# Patient Record
Sex: FEMALE | Race: WHITE | NOT HISPANIC OR LATINO | Employment: OTHER | URBAN - METROPOLITAN AREA
[De-identification: names, ages, dates, MRNs, and addresses within clinical notes are randomized per-mention and may not be internally consistent; named-entity substitution may affect disease eponyms.]

---

## 2017-08-19 ENCOUNTER — APPOINTMENT (EMERGENCY)
Dept: RADIOLOGY | Facility: HOSPITAL | Age: 82
End: 2017-08-19
Payer: MEDICARE

## 2017-08-19 ENCOUNTER — HOSPITAL ENCOUNTER (EMERGENCY)
Facility: HOSPITAL | Age: 82
Discharge: HOME | End: 2017-08-19
Attending: EMERGENCY MEDICINE | Admitting: EMERGENCY MEDICINE
Payer: MEDICARE

## 2017-08-19 VITALS
TEMPERATURE: 97.1 F | HEART RATE: 80 BPM | SYSTOLIC BLOOD PRESSURE: 124 MMHG | DIASTOLIC BLOOD PRESSURE: 58 MMHG | RESPIRATION RATE: 16 BRPM | HEIGHT: 59 IN | OXYGEN SATURATION: 94 %

## 2017-08-19 DIAGNOSIS — S63.502A LEFT WRIST SPRAIN: Primary | ICD-10-CM

## 2017-08-19 PROCEDURE — 99283 EMERGENCY DEPT VISIT LOW MDM: CPT

## 2017-08-19 PROCEDURE — 73110 X-RAY EXAM OF WRIST: CPT

## 2017-08-19 RX ORDER — ACETAMINOPHEN 325 MG/1
975 TABLET ORAL ONCE
Status: COMPLETED | OUTPATIENT
Start: 2017-08-19 | End: 2017-08-19

## 2017-08-19 RX ORDER — NYSTATIN 100000 U/G
OINTMENT TOPICAL
COMMUNITY

## 2017-08-19 RX ORDER — LISINOPRIL 20 MG/1
TABLET ORAL
COMMUNITY
Start: 2013-12-20 | End: 2021-02-26 | Stop reason: HOSPADM

## 2017-08-19 RX ORDER — CITALOPRAM 20 MG/1
TABLET ORAL
COMMUNITY
Start: 2013-10-10 | End: 2018-08-10

## 2017-08-19 RX ORDER — GABAPENTIN 100 MG/1
CAPSULE ORAL
COMMUNITY
Start: 2016-11-04 | End: 2018-08-10

## 2017-08-19 RX ORDER — PRAVASTATIN SODIUM 80 MG/1
TABLET ORAL
COMMUNITY
End: 2019-09-14

## 2017-08-19 RX ORDER — AMLODIPINE BESYLATE 5 MG/1
TABLET ORAL
COMMUNITY
Start: 2014-03-21

## 2017-08-19 RX ORDER — LEVOTHYROXINE SODIUM 0.1 MG/1
TABLET ORAL
COMMUNITY
Start: 2014-03-21

## 2017-08-19 RX ADMIN — ACETAMINOPHEN 975 MG: 325 TABLET ORAL at 11:06

## 2017-08-25 ENCOUNTER — ALLSCRIPTS OFFICE VISIT (OUTPATIENT)
Dept: OTHER | Facility: OTHER | Age: 82
End: 2017-08-25

## 2018-01-10 NOTE — PROCEDURES
Results/Data    Procedure: Electromyogram and Nerve Conduction Study  Indication: Bilateral Lower Extremities   Referred by Dr Berna Dalton  The procedure's were discussed with the patient  Written consent was obtained prior to the procedure and is detailed in the patient's record  Prior to the start of the procedure a time out was taken and the identity of the patient was confirmed via name and date of birth with the patient  The correct site and the procedure to be performed were confirmed  The correct side was confirmed if applicable  The positioning of the patient was verified  The availability of the correct equipment was verified  Procedure Start Time: 11:00    Technique: A sterile concentric needle electrode was used  The patient tolerated the procedure well  There were no complications  Results :Motor and sensory nerve conduction studies were performed on the bilateral peroneal, tibial and sural nerves  The right peroneal and the bilateral tibial compound motor action potentials were within normal limits  The left peroneal motor terminal latency was within normal limits with a low compound motor action potential amplitude and a normal conduction velocity distally and across the fibula head  The bilateral peroneal and tibial F wave latencies were within normal limits  The right sural sensory action potential was within normal limits  The left sural sensory peak latency was within normal limits with a low sensory action potential amplitude  The right and left H soleus responses were within normal limits  Concentric needle examination was performed on various proximal and distal muscles bilaterally including gluteus medius, vastus lateralis, tibialis anterior, medial gastrocnemius, EDB, L4-5 and L5-S1 paraspinal myotomes  There was no evidence of active denervation in any of the muscles tested   Mild decreased recruitment of giant motor units was noted in the left gluteus medius and EDB  The compound motor unit action potentials were of normal configuration with interference patterns being full or full for effort in the remaining muscles tested  Interpretation: There is electrophysiologic evidence of a:    1  Chronic left L5 radiculopathy as evidenced by the low peroneal motor amplitude and chronic denervation changes in the above-mentioned muscles  2  The low sural sensory amplitude on the left is of questionable significance  It maybe secondary to a technical error  Clinical and imaging correlation of the lumbosacral spine is suggested        Signatures   Electronically signed by : Judith Hadley MD; Aug 23 2016 11:38AM EST                       (Author)

## 2018-01-14 VITALS
HEART RATE: 64 BPM | WEIGHT: 204.38 LBS | SYSTOLIC BLOOD PRESSURE: 132 MMHG | DIASTOLIC BLOOD PRESSURE: 78 MMHG | HEIGHT: 60 IN | BODY MASS INDEX: 40.13 KG/M2

## 2018-08-02 ENCOUNTER — HOSPITAL ENCOUNTER (EMERGENCY)
Facility: HOSPITAL | Age: 83
Discharge: HOME | End: 2018-08-02
Attending: EMERGENCY MEDICINE | Admitting: EMERGENCY MEDICINE
Payer: MEDICARE

## 2018-08-02 VITALS
RESPIRATION RATE: 20 BRPM | HEART RATE: 82 BPM | OXYGEN SATURATION: 95 % | SYSTOLIC BLOOD PRESSURE: 126 MMHG | TEMPERATURE: 98.9 F | DIASTOLIC BLOOD PRESSURE: 61 MMHG | WEIGHT: 204.37 LBS | HEIGHT: 59 IN | BODY MASS INDEX: 41.2 KG/M2

## 2018-08-02 DIAGNOSIS — B02.9 HERPES ZOSTER WITHOUT COMPLICATION: Primary | ICD-10-CM

## 2018-08-02 PROCEDURE — 99282 EMERGENCY DEPT VISIT SF MDM: CPT

## 2018-08-02 RX ORDER — VALACYCLOVIR HYDROCHLORIDE 1 G/1
1000 TABLET, FILM COATED ORAL 3 TIMES DAILY
Qty: 21 TABLET | Refills: 0 | Status: SHIPPED | OUTPATIENT
Start: 2018-08-02 | End: 2018-08-10

## 2018-08-02 RX ORDER — OXYCODONE HYDROCHLORIDE AND ACETAMINOPHEN 5; 325 MG/1; MG/1
1 TABLET ORAL EVERY 4 HOURS PRN
Qty: 15 TABLET | Refills: 0 | Status: SHIPPED | OUTPATIENT
Start: 2018-08-02 | End: 2018-08-10

## 2018-08-02 NOTE — DISCHARGE INSTRUCTIONS
Shingles   AMBULATORY CARE:   Shingles  is a painful rash  Shingles is caused by the same virus that causes chickenpox (varicella-zoster virus)  After you get chickenpox, the virus stays in your body for several years without causing any symptoms  Shingles occurs when the virus becomes active again  Once active, the virus will travel along a nerve to your skin and cause a rash  Common signs and symptoms include the following:  Shingles often starts with pain in the back, chest, neck, or face  A rash then develops in the same area  The rash is usually found on only one side of the body  The rash may feel itchy or painful  It starts as red dots that become blisters filled with fluid  The blisters usually grow bigger, become filled with pus, and then crust over after a few days  You may also have any of the following:  · Fatigue and muscle weakness    · Pain when your skin is lightly touched    · Headache    · Fever    · Eye pain when exposed to light  Seek care immediately if:   · You have painful, red, warm skin around the blisters, or the blisters drain pus  · Your neck is stiff or you have trouble moving it  · You have trouble moving your arms, legs, or face  · You have a seizure  · You have weakness in an arm or leg  · You become confused, or have difficulty speaking  · You have dizziness, a severe headache, or hearing or vision loss  Contact your healthcare provider if:   · You feel weak or have a headache  · You have a cough, chills, or a fever  · You have abdominal pain or nausea, or you are vomiting  · Your rash becomes more itchy or painful  · Your rash spreads to other parts of your body  · Your pain worsens and does not go away even after you take medicine  · You have questions or concerns about your condition or care  Medicines:   · Antiviral medicine  helps decrease symptoms and healing time  They may also decrease your risk of developing nerve pain   You will need to start taking them within 3 days of the start of symptoms to prevent nerve pain  · Pain medicine  may be prescribed or suggested by your healthcare provider  You may need NSAIDs, acetaminophen, or opioid medicine depending on how much pain you are in  Do not wait until the pain is severe before you take more pain medicine  · Topical anesthetics  are used to numb the skin and decrease pain  They can be a cream, gel, spray, or patch  · Anticonvulsants  decrease nerve pain and may help you sleep at night  · Antidepressants  may be used to decrease nerve pain  Follow up with your healthcare provider as directed:  Write down your questions so you remember to ask them during your visits  Self-care:  Keep your rash clean and dry  Cover your rash with a bandage or clothing  Do not use bandages that stick to your skin  The sticky part may irritate your skin and make your rash last longer  Prevent the spread of shingles: The virus can be passed to a person who has never had chickenpox  This person may get chickenpox, but not shingles  You may pass the virus to others as long as you have a rash  The virus is spread by direct contact with the fluid from the blisters  Usually, you cannot spread the virus once the blisters dry up  Prevent shingles or another shingles outbreak:  A vaccine may be given to help prevent shingles  Ask for more information about this vaccine  © 2017 [employer] Information is for End User's use only and may not be sold, redistributed or otherwise used for commercial purposes  All illustrations and images included in CareNotes® are the copyrighted property of [employer]  or [employer]  The above information is an  only  It is not intended as medical advice for individual conditions or treatments  Talk to your doctor, nurse or pharmacist before following any medical regimen to see if it is safe and effective for you

## 2018-08-02 NOTE — ED PROVIDER NOTES
History  Chief Complaint   Patient presents with    Rash     Patient c/o rash from her mid sternum around to her right side  Patient states rash is very painful  Patient states history of shingles in the past       R flank pain x 3d, constant, pruritic, burning, nonradiating  No h/o similar sxs  No fever or chills  Currently on prednisone and levaquin per her pulmonologist for PNA  Currently symptomatic  No abd pain weakness or syncope  No CP aside from R flank pain  History provided by:  Relative and patient      Prior to Admission Medications   Prescriptions Last Dose Informant Patient Reported? Taking? amLODIPine (NORVASC) 5 mg tablet   Yes No   Sig: Take by mouth   aspirin 81 MG tablet   Yes No   Sig: Take by mouth   citalopram (CeleXA) 20 mg tablet   Yes No   Sig: Take by mouth   gabapentin (NEURONTIN) 100 mg capsule   Yes No   Sig: Take by mouth   levothyroxine 100 mcg tablet   Yes No   Sig: Take by mouth   lisinopril (ZESTRIL) 20 mg tablet   Yes No   Sig: Take by mouth   nystatin (MYCOSTATIN) ointment   Yes No   Sig: Nystatin 322480 UNIT/GM External Ointment  APPLY INCH  PRN   Refills: 0    Active   pravastatin (PRAVACHOL) 80 mg tablet   Yes No   Sig: Take by mouth      Facility-Administered Medications: None       Past Medical History:   Diagnosis Date    GERD (gastroesophageal reflux disease)     Hyperlipidemia     Hypertension        History reviewed  No pertinent surgical history  History reviewed  No pertinent family history  I have reviewed and agree with the history as documented  Social History   Substance Use Topics    Smoking status: Never Smoker    Smokeless tobacco: Never Used    Alcohol use No        Review of Systems   Constitutional: Negative for chills and fever  HENT: Negative for congestion, ear pain, sore throat and trouble swallowing  Eyes: Negative for discharge, itching and visual disturbance     Respiratory: Negative for apnea, choking and chest tightness  Cardiovascular: Negative for chest pain and leg swelling  Genitourinary: Negative  Musculoskeletal: Negative  Skin: Positive for rash  Negative for color change, pallor and wound  Neurological: Negative  Hematological: Negative  Physical Exam  Physical Exam   Constitutional: She is oriented to person, place, and time  She appears well-developed and well-nourished  HENT:   Head: Normocephalic and atraumatic  Eyes: EOM are normal  Pupils are equal, round, and reactive to light  Neck: Normal range of motion  Neck supple  Pulmonary/Chest: Effort normal    Abdominal: Soft  Musculoskeletal: Normal range of motion  She exhibits no edema or deformity  Neurological: She is alert and oriented to person, place, and time  Skin: Skin is warm and dry  Rash noted  No erythema  R dermatomally distributed erythematous blanching rash with centrally located vesicles, no crepitus, no fluctuance   Nursing note and vitals reviewed  Vital Signs  ED Triage Vitals [08/02/18 0918]   Temp Pulse Respirations Blood Pressure SpO2   -- 82 20 126/61 95 %      Temp src Heart Rate Source Patient Position - Orthostatic VS BP Location FiO2 (%)   -- Monitor Sitting Right arm --      Pain Score       --           Vitals:    08/02/18 0918   BP: 126/61   Pulse: 82   Patient Position - Orthostatic VS: Sitting       Visual Acuity      ED Medications  Medications - No data to display    Diagnostic Studies  Results Reviewed     None                 No orders to display              Procedures  Procedures       Phone Contacts  ED Phone Contact    ED Course                               MDM  Number of Diagnoses or Management Options  Herpes zoster without complication:   Diagnosis management comments: Localized rash c/w zoster  Already on steroids as outpt  Plan - antivirals, pain control, d/c home  Instructed to avoid pregnant and immunocompromised pts       CritCare Time    Disposition  Final diagnoses: Herpes zoster without complication     Time reflects when diagnosis was documented in both MDM as applicable and the Disposition within this note     Time User Action Codes Description Comment    8/2/2018  9:25 AM Dayday Santiago Felton [B02 9] Herpes zoster without complication       ED Disposition     ED Disposition Condition Comment    Discharge  [place] discharge to home/self care  Condition at discharge: Stable        Follow-up Information    None         Patient's Medications   Discharge Prescriptions    OXYCODONE-ACETAMINOPHEN (PERCOCET) 5-325 MG PER TABLET    Take 1 tablet by mouth every 4 (four) hours as needed for moderate pain for up to 15 doses Max Daily Amount: 6 tablets       Start Date: 8/2/2018  End Date: --       Order Dose: 1 tablet       Quantity: 15 tablet    Refills: 0    VALACYCLOVIR (VALTREX) 1,000 MG TABLET    Take 1 tablet (1,000 mg total) by mouth 3 (three) times a day for 7 days       Start Date: 8/2/2018  End Date: 8/9/2018       Order Dose: 1,000 mg       Quantity: 21 tablet    Refills: 0     No discharge procedures on file      ED Provider  Electronically Signed by           Catrachito Marlow MD  08/02/18 1975

## 2018-08-10 ENCOUNTER — HOSPITAL ENCOUNTER (INPATIENT)
Facility: HOSPITAL | Age: 83
LOS: 5 days | Discharge: HOME | DRG: 394 | End: 2018-08-15
Attending: EMERGENCY MEDICINE | Admitting: FAMILY MEDICINE
Payer: MEDICARE

## 2018-08-10 ENCOUNTER — APPOINTMENT (EMERGENCY)
Dept: CT IMAGING | Facility: HOSPITAL | Age: 83
DRG: 394 | End: 2018-08-10
Payer: MEDICARE

## 2018-08-10 DIAGNOSIS — K63.9 COLON WALL THICKENING: ICD-10-CM

## 2018-08-10 DIAGNOSIS — E86.0 DEHYDRATION: ICD-10-CM

## 2018-08-10 DIAGNOSIS — D72.829 LEUKOCYTOSIS, UNSPECIFIED TYPE: ICD-10-CM

## 2018-08-10 DIAGNOSIS — R10.84 GENERALIZED ABDOMINAL PAIN: ICD-10-CM

## 2018-08-10 DIAGNOSIS — R11.2 NAUSEA & VOMITING: Primary | ICD-10-CM

## 2018-08-10 DIAGNOSIS — R19.7 DIARRHEA: ICD-10-CM

## 2018-08-10 DIAGNOSIS — N17.9 AKI (ACUTE KIDNEY INJURY) (HCC): ICD-10-CM

## 2018-08-10 PROBLEM — I10 HYPERTENSION: Status: ACTIVE | Noted: 2018-08-10

## 2018-08-10 PROBLEM — R21 RASH: Status: ACTIVE | Noted: 2018-08-10

## 2018-08-10 PROBLEM — R31.9 HEMATURIA: Status: ACTIVE | Noted: 2018-08-10

## 2018-08-10 PROBLEM — R11.10 VOMITING: Status: ACTIVE | Noted: 2018-08-10

## 2018-08-10 LAB
ALBUMIN SERPL BCP-MCNC: 3.1 G/DL (ref 3.5–5)
ALP SERPL-CCNC: 96 U/L (ref 46–116)
ALT SERPL W P-5'-P-CCNC: 20 U/L (ref 12–78)
ANION GAP SERPL CALCULATED.3IONS-SCNC: 12 MMOL/L (ref 4–13)
AST SERPL W P-5'-P-CCNC: 20 U/L (ref 5–45)
ATRIAL RATE: 69 BPM
BACTERIA UR QL AUTO: ABNORMAL /HPF
BASOPHILS # BLD AUTO: 0.02 THOUSANDS/ΜL (ref 0–0.1)
BASOPHILS NFR BLD AUTO: 0 % (ref 0–1)
BILIRUB SERPL-MCNC: 0.6 MG/DL (ref 0.2–1)
BILIRUB UR QL STRIP: ABNORMAL
BUN SERPL-MCNC: 26 MG/DL (ref 5–25)
C DIFF TOX GENS STL QL NAA+PROBE: NORMAL
CALCIUM SERPL-MCNC: 9.1 MG/DL (ref 8.3–10.1)
CHLORIDE SERPL-SCNC: 98 MMOL/L (ref 100–108)
CLARITY UR: ABNORMAL
CO2 SERPL-SCNC: 23 MMOL/L (ref 21–32)
COLOR UR: ABNORMAL
CREAT SERPL-MCNC: 1.91 MG/DL (ref 0.6–1.3)
EOSINOPHIL # BLD AUTO: 0.03 THOUSAND/ΜL (ref 0–0.61)
EOSINOPHIL NFR BLD AUTO: 0 % (ref 0–6)
ERYTHROCYTE [DISTWIDTH] IN BLOOD BY AUTOMATED COUNT: 16.3 % (ref 11.6–15.1)
GFR SERPL CREATININE-BSD FRML MDRD: 24 ML/MIN/1.73SQ M
GLUCOSE SERPL-MCNC: 162 MG/DL (ref 65–140)
GLUCOSE UR STRIP-MCNC: NEGATIVE MG/DL
HCT VFR BLD AUTO: 45.2 % (ref 34.8–46.1)
HCT VFR BLD AUTO: 47.9 % (ref 34.8–46.1)
HGB BLD-MCNC: 14.8 G/DL (ref 11.5–15.4)
HGB BLD-MCNC: 15.6 G/DL (ref 11.5–15.4)
HGB UR QL STRIP.AUTO: ABNORMAL
HOLD SPECIMEN: NORMAL
HYALINE CASTS #/AREA URNS LPF: ABNORMAL /LPF
IMM GRANULOCYTES # BLD AUTO: 0.16 THOUSAND/UL (ref 0–0.2)
IMM GRANULOCYTES NFR BLD AUTO: 1 % (ref 0–2)
KETONES UR STRIP-MCNC: ABNORMAL MG/DL
LACTATE SERPL-SCNC: 1 MMOL/L (ref 0.5–2)
LEUKOCYTE ESTERASE UR QL STRIP: ABNORMAL
LIPASE SERPL-CCNC: 108 U/L (ref 73–393)
LYMPHOCYTES # BLD AUTO: 1.35 THOUSANDS/ΜL (ref 0.6–4.47)
LYMPHOCYTES NFR BLD AUTO: 9 % (ref 14–44)
MCH RBC QN AUTO: 26.4 PG (ref 26.8–34.3)
MCHC RBC AUTO-ENTMCNC: 32.6 G/DL (ref 31.4–37.4)
MCV RBC AUTO: 81 FL (ref 82–98)
MONOCYTES # BLD AUTO: 1.15 THOUSAND/ΜL (ref 0.17–1.22)
MONOCYTES NFR BLD AUTO: 8 % (ref 4–12)
MUCOUS THREADS UR QL AUTO: ABNORMAL
NEUTROPHILS # BLD AUTO: 12.33 THOUSANDS/ΜL (ref 1.85–7.62)
NEUTS SEG NFR BLD AUTO: 82 % (ref 43–75)
NITRITE UR QL STRIP: NEGATIVE
NON-SQ EPI CELLS URNS QL MICRO: ABNORMAL /HPF
NRBC BLD AUTO-RTO: 0 /100 WBCS
P AXIS: 67 DEGREES
PH UR STRIP.AUTO: 5.5 [PH] (ref 4.5–8)
PLATELET # BLD AUTO: 284 THOUSANDS/UL (ref 149–390)
PMV BLD AUTO: 11.3 FL (ref 8.9–12.7)
POTASSIUM SERPL-SCNC: 4.3 MMOL/L (ref 3.5–5.3)
PR INTERVAL: 144 MS
PROT SERPL-MCNC: 7.1 G/DL (ref 6.4–8.2)
PROT UR STRIP-MCNC: ABNORMAL MG/DL
QRS AXIS: 24 DEGREES
QRSD INTERVAL: 86 MS
QT INTERVAL: 414 MS
QTC INTERVAL: 443 MS
RBC # BLD AUTO: 5.92 MILLION/UL (ref 3.81–5.12)
RBC #/AREA URNS AUTO: ABNORMAL /HPF
SODIUM SERPL-SCNC: 133 MMOL/L (ref 136–145)
SP GR UR STRIP.AUTO: >=1.03 (ref 1–1.03)
T WAVE AXIS: 54 DEGREES
UROBILINOGEN UR QL STRIP.AUTO: 0.2 E.U./DL
VENTRICULAR RATE: 69 BPM
WBC # BLD AUTO: 15.04 THOUSAND/UL (ref 4.31–10.16)
WBC #/AREA URNS AUTO: ABNORMAL /HPF

## 2018-08-10 PROCEDURE — 80053 COMPREHEN METABOLIC PANEL: CPT

## 2018-08-10 PROCEDURE — 83690 ASSAY OF LIPASE: CPT

## 2018-08-10 PROCEDURE — 74176 CT ABD & PELVIS W/O CONTRAST: CPT

## 2018-08-10 PROCEDURE — 85018 HEMOGLOBIN: CPT | Performed by: PHYSICIAN ASSISTANT

## 2018-08-10 PROCEDURE — 93010 ELECTROCARDIOGRAM REPORT: CPT | Performed by: INTERNAL MEDICINE

## 2018-08-10 PROCEDURE — 87177 OVA AND PARASITES SMEARS: CPT | Performed by: NURSE PRACTITIONER

## 2018-08-10 PROCEDURE — 99221 1ST HOSP IP/OBS SF/LOW 40: CPT | Performed by: INTERNAL MEDICINE

## 2018-08-10 PROCEDURE — 87086 URINE CULTURE/COLONY COUNT: CPT

## 2018-08-10 PROCEDURE — 87209 SMEAR COMPLEX STAIN: CPT | Performed by: NURSE PRACTITIONER

## 2018-08-10 PROCEDURE — 96361 HYDRATE IV INFUSION ADD-ON: CPT

## 2018-08-10 PROCEDURE — 87505 NFCT AGENT DETECTION GI: CPT | Performed by: NURSE PRACTITIONER

## 2018-08-10 PROCEDURE — 96375 TX/PRO/DX INJ NEW DRUG ADDON: CPT

## 2018-08-10 PROCEDURE — 96374 THER/PROPH/DIAG INJ IV PUSH: CPT

## 2018-08-10 PROCEDURE — 87493 C DIFF AMPLIFIED PROBE: CPT | Performed by: NURSE PRACTITIONER

## 2018-08-10 PROCEDURE — 99285 EMERGENCY DEPT VISIT HI MDM: CPT

## 2018-08-10 PROCEDURE — 85014 HEMATOCRIT: CPT | Performed by: PHYSICIAN ASSISTANT

## 2018-08-10 PROCEDURE — 36415 COLL VENOUS BLD VENIPUNCTURE: CPT

## 2018-08-10 PROCEDURE — 93005 ELECTROCARDIOGRAM TRACING: CPT

## 2018-08-10 PROCEDURE — 87040 BLOOD CULTURE FOR BACTERIA: CPT | Performed by: NURSE PRACTITIONER

## 2018-08-10 PROCEDURE — 99220 PR INITIAL OBSERVATION CARE/DAY 70 MINUTES: CPT | Performed by: FAMILY MEDICINE

## 2018-08-10 PROCEDURE — 83605 ASSAY OF LACTIC ACID: CPT | Performed by: PHYSICIAN ASSISTANT

## 2018-08-10 PROCEDURE — 81001 URINALYSIS AUTO W/SCOPE: CPT

## 2018-08-10 PROCEDURE — 85025 COMPLETE CBC W/AUTO DIFF WBC: CPT

## 2018-08-10 RX ORDER — ONDANSETRON 2 MG/ML
4 INJECTION INTRAMUSCULAR; INTRAVENOUS ONCE
Status: COMPLETED | OUTPATIENT
Start: 2018-08-10 | End: 2018-08-10

## 2018-08-10 RX ORDER — LEVOTHYROXINE SODIUM 0.1 MG/1
100 TABLET ORAL
Status: DISCONTINUED | OUTPATIENT
Start: 2018-08-10 | End: 2018-08-15 | Stop reason: HOSPADM

## 2018-08-10 RX ORDER — PROCHLORPERAZINE MALEATE 10 MG
5 TABLET ORAL ONCE
Status: COMPLETED | OUTPATIENT
Start: 2018-08-10 | End: 2018-08-10

## 2018-08-10 RX ORDER — PRAVASTATIN SODIUM 80 MG/1
80 TABLET ORAL
Status: DISCONTINUED | OUTPATIENT
Start: 2018-08-10 | End: 2018-08-15 | Stop reason: HOSPADM

## 2018-08-10 RX ORDER — SODIUM CHLORIDE 9 MG/ML
75 INJECTION, SOLUTION INTRAVENOUS CONTINUOUS
Status: DISCONTINUED | OUTPATIENT
Start: 2018-08-10 | End: 2018-08-12

## 2018-08-10 RX ORDER — OXYCODONE HYDROCHLORIDE AND ACETAMINOPHEN 5; 325 MG/1; MG/1
1 TABLET ORAL EVERY 4 HOURS PRN
Status: DISCONTINUED | OUTPATIENT
Start: 2018-08-10 | End: 2018-08-15 | Stop reason: HOSPADM

## 2018-08-10 RX ORDER — AMLODIPINE BESYLATE 5 MG/1
5 TABLET ORAL DAILY
Status: DISCONTINUED | OUTPATIENT
Start: 2018-08-10 | End: 2018-08-15 | Stop reason: HOSPADM

## 2018-08-10 RX ORDER — PROCHLORPERAZINE MALEATE 10 MG
5 TABLET ORAL EVERY 6 HOURS PRN
Status: DISCONTINUED | OUTPATIENT
Start: 2018-08-10 | End: 2018-08-10

## 2018-08-10 RX ORDER — DICYCLOMINE HYDROCHLORIDE 10 MG/1
20 CAPSULE ORAL 3 TIMES DAILY
Status: DISCONTINUED | OUTPATIENT
Start: 2018-08-10 | End: 2018-08-10

## 2018-08-10 RX ORDER — SODIUM CHLORIDE 9 MG/ML
75 INJECTION, SOLUTION INTRAVENOUS ONCE
Status: COMPLETED | OUTPATIENT
Start: 2018-08-10 | End: 2018-08-10

## 2018-08-10 RX ORDER — HEPARIN SODIUM 5000 [USP'U]/ML
5000 INJECTION, SOLUTION INTRAVENOUS; SUBCUTANEOUS EVERY 8 HOURS SCHEDULED
Status: DISCONTINUED | OUTPATIENT
Start: 2018-08-10 | End: 2018-08-10

## 2018-08-10 RX ORDER — ASPIRIN 81 MG/1
81 TABLET, CHEWABLE ORAL DAILY
Status: DISCONTINUED | OUTPATIENT
Start: 2018-08-10 | End: 2018-08-15 | Stop reason: HOSPADM

## 2018-08-10 RX ORDER — MORPHINE SULFATE 2 MG/ML
2 INJECTION, SOLUTION INTRAMUSCULAR; INTRAVENOUS ONCE
Status: COMPLETED | OUTPATIENT
Start: 2018-08-10 | End: 2018-08-10

## 2018-08-10 RX ORDER — ONDANSETRON 4 MG/1
4 TABLET, ORALLY DISINTEGRATING ORAL EVERY 6 HOURS PRN
Status: DISCONTINUED | OUTPATIENT
Start: 2018-08-10 | End: 2018-08-15 | Stop reason: HOSPADM

## 2018-08-10 RX ORDER — ONDANSETRON 2 MG/ML
4 INJECTION INTRAMUSCULAR; INTRAVENOUS EVERY 6 HOURS PRN
Status: DISCONTINUED | OUTPATIENT
Start: 2018-08-10 | End: 2018-08-10

## 2018-08-10 RX ADMIN — SODIUM CHLORIDE 75 ML/HR: 0.9 INJECTION, SOLUTION INTRAVENOUS at 06:38

## 2018-08-10 RX ADMIN — ONDANSETRON 4 MG: 2 INJECTION INTRAMUSCULAR; INTRAVENOUS at 02:53

## 2018-08-10 RX ADMIN — MORPHINE SULFATE 2 MG: 2 INJECTION, SOLUTION INTRAMUSCULAR; INTRAVENOUS at 03:28

## 2018-08-10 RX ADMIN — SODIUM CHLORIDE 75 ML/HR: 0.9 INJECTION, SOLUTION INTRAVENOUS at 10:59

## 2018-08-10 RX ADMIN — PRAVASTATIN SODIUM 80 MG: 80 TABLET ORAL at 17:05

## 2018-08-10 RX ADMIN — ONDANSETRON 4 MG: 2 INJECTION, SOLUTION INTRAMUSCULAR; INTRAVENOUS at 17:06

## 2018-08-10 RX ADMIN — SODIUM CHLORIDE 500 ML: 0.9 INJECTION, SOLUTION INTRAVENOUS at 02:53

## 2018-08-10 RX ADMIN — ASPIRIN 81 MG CHEWABLE TABLET 81 MG: 81 TABLET CHEWABLE at 09:21

## 2018-08-10 RX ADMIN — AMLODIPINE BESYLATE 5 MG: 5 TABLET ORAL at 09:21

## 2018-08-10 RX ADMIN — DICYCLOMINE HYDROCHLORIDE 20 MG: 10 CAPSULE ORAL at 09:20

## 2018-08-10 RX ADMIN — PROCHLORPERAZINE MALEATE 5 MG: 10 TABLET, FILM COATED ORAL at 21:17

## 2018-08-10 RX ADMIN — FAMOTIDINE 20 MG: 10 INJECTION, SOLUTION INTRAVENOUS at 02:53

## 2018-08-10 NOTE — CASE MANAGEMENT
Initial Clinical Review    Admission: Date/Time/Statement: OBSERVATION 8/10/18 @0454    Orders Placed This Encounter   Procedures    Place in Observation (expected length of stay for this patient is less than two midnights)     Standing Status:   Standing     Number of Occurrences:   1     Order Specific Question:   Admitting Physician     Answer:   Rosibel Carmichael [[id number]]     Order Specific Question:   Level of Care     Answer:   Med Surg [16]         ED: Date/Time/Mode of Arrival:   ED Arrival Information     Expected Arrival Acuity Means of Arrival Escorted By Service Admission Type    - 8/10/2018 01:29 Urgent Wheelchair Family Member General Medicine Urgent    Arrival Complaint    vomiting,diarrhea          Chief Complaint:   Chief Complaint   Patient presents with    Vomiting     pt states she has been throwing up for the past two days as well as samm, she states her "hemmroids are bleeding", was treated for shingles as well pleuracy last week       History of Illness: 81 yo fem c/o abd pain with n/v/d  Hypertympanic  Differential includes obstruction, enteritis, and diarrhea      ED Vital Signs:   ED Triage Vitals   Temperature Pulse Respirations Blood Pressure SpO2   08/10/18 0143 08/10/18 0143 08/10/18 0143 08/10/18 0143 08/10/18 0143   97 8 °F (36 6 °C) 82 18 164/88 94 %      Temp Source Heart Rate Source Patient Position - Orthostatic VS BP Location FiO2 (%)   08/10/18 0143 08/10/18 0143 08/10/18 0143 08/10/18 0143 --   Oral Monitor Lying Right arm       Pain Score       08/10/18 0514       No Pain        Wt Readings from Last 1 Encounters:   08/10/18 86 8 kg (191 lb 5 8 oz)       Vital Signs (abnormal): 188/91    Abnormal Labs/Diagnostic Test Results:   Na 133   Cl 98   Bun 26   Creat 1 91   Gluc 162   Alb 3 1   Wbc 15 04   hgb 15 6   hct 47 9     UA: sg>=1 030   Tr ket   Tr bld   sm leuks   +1 prot   sm bili   0-1 rbc   10-20 wbc   occ bacteria   occ mucous     2-4 hyaline casts    occ epithelials  bld c&s pending  Urine c&s pending  CT a&p: Thickening of the distal transverse colon and the descending colon with surrounding inflammatory changes   Findings likely represent colitis (infectious versus inflammatory less likely ischemic)         ED Treatment:   Medication Administration from 08/10/2018 0129 to 08/10/2018 0545       Date/Time Order Dose Route Action Action by Comments     08/10/2018 0253 sodium chloride 0 9 % bolus 500 mL 500 mL Intravenous New Bag Jose Armando Trejo RN      08/10/2018 0253 famotidine (PEPCID) injection 20 mg 20 mg Intravenous Given Jose Armando Trejo RN      08/10/2018 0253 ondansetron (ZOFRAN) injection 4 mg 4 mg Intravenous Given Jose Armando Trejo RN      08/10/2018 0328 morphine injection 2 mg 2 mg Intravenous Given Jose Armando Trejo RN           Past Medical History:   Diagnosis Date    Arthritis     Asthma     [facility] obstructive [facility]) [facility]     Disease of thyroid gland     GERD (gastroesophageal reflux disease)     Hyperlipidemia     Hypertension     Renal disorder        Admitting Diagnosis: Diarrhea [R19 7]  Dehydration [E86 0]  Vomiting [R11 10]  Nausea & vomiting [R11 2]  KRISTYN (acute kidney injury) ([facility] ) [N17 9]    Age/Sex: 82 y o  female    Assessment/Plan:   Generalized abdominal pain   Assessment & Plan     associated with nausea, vomiting, diarrhea  Will initiate Cipro, Flagyl  GI consult   Comfort measures, IV fluids, antiemetic, pain management  NPO, ICE chips           KRISTNY (acute kidney injury) ([facility] )   Assessment & Plan      creatinine 1 91 likely secondary to acute dehydration in the setting of nausea vomiting diarrhea  Gentle hydration  Monitor creatinine if not improved consider nephrology consult for further management  Avoid nephrotoxin agent         Leukocytosis   Assessment & Plan      WBC greater than 15  Likely secondary to acute gastroenteritis versus colitis  UA pending  Blood cultures    Initiate Flagyl and Cipro   Monitor WBC and elevated temp         Rash   Assessment & Plan     Right abdominal area   Pt denies pain or itching          Hypertension   Assessment & Plan     Hold lisinopril secondary to Rubio  Continue amlodipine  Continue monitor    Patient will be admitted on an Observation basis with an anticipated length of stay of  less than than 2 midnights  Justification for Hospital Stay:  Abdominal pain, nausea, vomiting, diarrhea GI consult    Admission Orders:  Scheduled Meds:   Current Facility-Administered Medications:  amLODIPine 5 mg Oral Daily   aspirin 81 mg Oral Daily   dicyclomine 20 mg Oral TID   heparin (porcine) 5,000 Units Subcutaneous Q8H [address]   levothyroxine 100 mcg Oral Early Morning   ondansetron 4 mg Intravenous Q6H PRN   pravastatin 80 mg Oral Daily With Dinner     SCD's  OOB as scot  Up w/assist  Bmp, mg, cbc in am  Stool for c   Diff, enteric bacterial panel, o&p  Urine c&s  NPO  Cons GI

## 2018-08-10 NOTE — ASSESSMENT & PLAN NOTE
Present on admission, creatinine 1 91 likely secondary to acute dehydration in the setting of nausea vomiting diarrhea  Gentle hydration  Monitor creatinine if not improved consider nephrology consult for further management  Avoid nephrotoxin agent  Monitor kidney function

## 2018-08-10 NOTE — ASSESSMENT & PLAN NOTE
Present on admission, WBC greater than 15  Likely secondary to acute gastroenteritis versus colitis  Does not meet SIRS criteria  UA pending  Blood cultures  Initiate Flagyl and Cipro  Monitor WBC and elevated temp

## 2018-08-10 NOTE — ASSESSMENT & PLAN NOTE
Right abdominal area, appears to be healed shingles  No clear vesicles noted  Pt denies pain or itching

## 2018-08-10 NOTE — ASSESSMENT & PLAN NOTE
Pre-hospital associated with nausea, vomiting, diarrhea  CT of the abdomen reveals thickening of distal transverse colon and descending colon with surrounding inflammatory changes possible colitis  Infectious versus inflammatory  UA reviewed, unremarkable  Will initiate Cipro, Flagyl  GI consult for further management  Comfort measures, IV fluids, antiemetic, pain management  NPO, ICE chips  Will monitor for electrolyte imbalance

## 2018-08-10 NOTE — CONSULTS
Consultation - [facility]  Paige John 82 y o  female MRN: [id number]  Unit/Bed#: -01 Encounter: [id number]        Consults    Reason for Consult / Principal Problem: Abdominal Pain, N/V/D    HPI: Ms Pastor Garcia is a 81 yo F with a PMH of HTN, HLD, GERD, hypothyroidism, presenting for evaluation of abdominal pain, nausea, vomiting, diarrhea  She started having abdominal pain a few days ago and was constipated as well which is unusual for her  Yesterday morning she started to have diarrhea as well as nausea and vomiting  At that time the diarrhea was nonbloody and there was no hematemesis  She was on the toilet for at least 3 hours because she was so weak and felt like she could not get up  She came to the hospital because of her symptoms  This morning she started to pass dark maroon liquid from her rectum  She no longer has nausea or vomiting but continues to have left-sided abdominal pain  She was feeling very dizzy and lightheaded yesterday while she was on the toilet but today when she was ambulating to the bathroom she did not have any of the symptoms  She denies any fevers prior to admission  She denies any recent sick contacts  She was in the ER last week for newly diagnosed shingles and so she was started on Valtrex  She does take a baby aspirin daily but denies any other NSAID use or anticoagulation  She believes her last colonoscopy was 4-5 years ago without any significant abnormalities except for hemorrhoids  She does admit to intermittent hemorrhoid bleeding from time to time but this is usually very small and the bleeding this morning was more significant      REVIEW OF SYSTEMS: Negative except for as stated above    Historical Information   Past Medical History:   Diagnosis Date    Arthritis     Asthma     COPD (chronic obstructive pulmonary disease) ([facility] [address]     Disease of thyroid gland     GERD (gastroesophageal reflux disease)     Hyperlipidemia     Hypertension     Renal disorder      Past Surgical History:   Procedure Laterality Date    ABDOMINAL SURGERY      APPENDECTOMY      BREAST SURGERY      Bilateral biopsys, benign    EYE SURGERY      TONSILLECTOMY       Social History   History   Alcohol Use No     History   Drug Use No     History   Smoking Status    Never Smoker   Smokeless Tobacco    Never Used     History reviewed  No pertinent family history  Meds/Allergies     Prescriptions Prior to Admission   Medication    amLODIPine (NORVASC) 5 mg tablet    aspirin 81 MG tablet    levothyroxine 100 mcg tablet    lisinopril (ZESTRIL) 20 mg tablet    nystatin (MYCOSTATIN) ointment    pravastatin (PRAVACHOL) 80 mg tablet     Current Facility-Administered Medications   Medication Dose Route Frequency    amLODIPine (NORVASC) tablet 5 mg  5 mg Oral Daily    aspirin chewable tablet 81 mg  81 mg Oral Daily    levothyroxine tablet 100 mcg  100 mcg Oral Early Morning    ondansetron (ZOFRAN) injection 4 mg  4 mg Intravenous Q6H PRN    pravastatin (PRAVACHOL) tablet 80 mg  80 mg Oral Daily With Dinner    sodium chloride 0 9 % infusion  75 mL/hr Intravenous Continuous       Allergies   Allergen Reactions    Erythromycin Anaphylaxis, Rash and Tremor    Pravastatin Other (See Comments)           Objective     Blood pressure 157/74, pulse 74, temperature 97 5 °F (36 4 °C), temperature source Oral, resp  rate 18, height 4' 10" (1 473 m), weight 86 8 kg (191 lb 5 8 oz), SpO2 96 %, not currently breastfeeding        Intake/Output Summary (Last 24 hours) at 08/10/18 1203  Last data filed at 08/10/18 6085   Gross per 24 hour   Intake             1000 ml   Output                0 ml   Net             1000 ml         PHYSICAL EXAM:      General Appearance:   Alert, oriented x3, no acute distress   HENT[de-identified]   Eyes:  Normocephalic, atraumatic    Anicteric   Neck:  Supple, symmetrical, trachea midline   Lungs:   Clear to auscultation bilaterally, no respiratory distress   Heart[de-identified]   RRR, no murmur   Abdomen:   Non-distended, soft, BS active, (+) TTP in the epigastric, L side of the abdomen    Rectal:   Deferred    Extremities:  No cyanosis or LE edema    Pulses:  2+ and symmetric all extremities    Skin:  No jaundice or pallor     Lab Results:     Results from last 7 days  Lab Units 08/10/18  1022 08/10/18  0221   WBC Thousand/uL  --  15 04*   HEMOGLOBIN g/dL 14 8 15 6*   HEMATOCRIT % 45 2 47 9*   PLATELETS Thousands/uL  --  284   NEUTROS PCT %  --  82*   LYMPHS PCT %  --  9*   MONOS PCT %  --  8   EOS PCT %  --  0       Results from last 7 days  Lab Units 08/10/18  0221   SODIUM mmol/L 133*   POTASSIUM mmol/L 4 3   CHLORIDE mmol/L 98*   CO2 mmol/L 23   BUN mg/dL 26*   CREATININE mg/dL 1 91*   CALCIUM mg/dL 9 1   TOTAL PROTEIN g/dL 7 1   BILIRUBIN TOTAL mg/dL 0 60   ALK PHOS U/L 96   ALT U/L 20   AST U/L 20   GLUCOSE RANDOM mg/dL 162*           Results from last 7 days  Lab Units 08/10/18  0221   LIPASE u/L 108       Imaging Studies: I have personally reviewed pertinent imaging studies  Ct Abdomen Pelvis Wo Contrast  Result Date: 8/10/2018  Impression: Thickening of the distal transverse colon and the descending colon with surrounding inflammatory changes  Findings likely represent colitis (infectious versus inflammatory less likely ischemic)  Follow-up with gastroenterology is recommended         ASSESSMENT and PLAN:      Acute Colitis  Hematochezia  - CT on admission shows thickening of the distal transverse colon and descending colon with surrounding inflammatory changes concerning for colitis  - Due to onset of hematochezia this morning and reported lightheadedness yesterday, this could be ischemic colitis  - The differential also includes laxative induced colitis, unclear if she took anything prior to admission given constipation preceding her symptoms of diarrhea  - Follow up stools studies including Cdiff, stool culture, and O+P for etiologies of infectious colitis  - Unlikely to be new onset inflammatory colitis given her age  - Hold off on antibiotics given lack of fever but low threshold given her age if she has any change in HD stability or fever  - Hb normal on admission, recheck H/H now, check lactic acid stat  - Clear liquids today  - Serial abdominal exams  - Telemetry for monitoring of arrhythmias  - Gentle IVF hydration as patient appeared to be dehydrated on admission with elevated Cr/BUN and elevated hematocrit      Patient will be seen and examined by Dr Apolinar Clements  All lund medical decisions were made by Dr Apolinar Clements  Thank you for allowing us to participate in the care of this patient  We will follow with you closely

## 2018-08-10 NOTE — ED PROVIDER NOTES
History  Chief Complaint   Patient presents with    Vomiting     pt states she has been throwing up for the past two days as well as samm, she states her "hemmroids are bleeding", was treated for shingles as well pleuracy last week     82year old female pt  Presents to the ED with cc of abdominal pain with n/v/d  She has had multiple abdominal surgeries  She is hypertympanic  Will evaluate for dd of obstruction, enteritis, diarrhea  History provided by:  Patient   used: No    Vomiting   Severity:  Moderate  Timing:  Constant  Quality:  Unable to specify  Progression:  Worsening  Chronicity:  New  Recent urination:  Normal  Context: not post-tussive and not self-induced    Relieved by:  Nothing  Worsened by:  Nothing  Ineffective treatments:  None tried  Associated symptoms: abdominal pain and diarrhea    Associated symptoms: no arthralgias and no cough        Prior to Admission Medications   Prescriptions Last Dose Informant Patient Reported? Taking?    amLODIPine (NORVASC) 5 mg tablet Past Week at 2100  Yes Yes   Sig: Take by mouth   aspirin 81 MG tablet 8/9/2018 at 0900  Yes Yes   Sig: Take by mouth   levothyroxine 100 mcg tablet 8/9/2018 at 0600  Yes Yes   Sig: Take by mouth   lisinopril (ZESTRIL) 20 mg tablet 8/9/2018 at 0900  Yes Yes   Sig: Take by mouth   nystatin (MYCOSTATIN) ointment 8/9/2018 at 1000  Yes Yes   Sig: Nystatin 578941 UNIT/GM External Ointment  APPLY INCH  PRN   Refills: 0    Active   pravastatin (PRAVACHOL) 80 mg tablet 8/9/2018 at 0900  Yes Yes   Sig: Take by mouth      Facility-Administered Medications: None       Past Medical History:   Diagnosis Date    Arthritis     Asthma     [facility] (chronic obstructive pulmonary disease) ([facility] [address]     Disease of thyroid gland     GERD (gastroesophageal reflux disease)     Hyperlipidemia     Hypertension     Renal disorder        Past Surgical History:   Procedure Laterality Date    ABDOMINAL SURGERY      APPENDECTOMY  BREAST SURGERY      Bilateral biopsys, benign    EYE SURGERY      TONSILLECTOMY         History reviewed  No pertinent family history  I have reviewed and agree with the history as documented  Social History   Substance Use Topics    Smoking status: Never Smoker    Smokeless tobacco: Never Used    Alcohol use No        Review of Systems   Respiratory: Negative for cough  Gastrointestinal: Positive for abdominal pain, diarrhea and vomiting  Musculoskeletal: Negative for arthralgias  All other systems reviewed and are negative  Physical Exam  Physical Exam   Constitutional: She is oriented to person, place, and time  She appears well-developed and well-nourished  HENT:   Head: Normocephalic and atraumatic  Right Ear: External ear normal    Left Ear: External ear normal    Eyes: Conjunctivae and EOM are normal    Neck: No JVD present  No tracheal deviation present  No thyromegaly present  Cardiovascular: Normal rate  Pulmonary/Chest: Effort normal and breath sounds normal  No stridor  Abdominal: Soft  She exhibits no distension and no mass  There is no tenderness  There is no guarding  No hernia  Musculoskeletal: Normal range of motion  She exhibits no edema, tenderness or deformity  Lymphadenopathy:     She has no cervical adenopathy  Neurological: She is alert and oriented to person, place, and time  Skin: Skin is warm  No rash noted  No erythema  No pallor  Psychiatric: She has a normal mood and affect  Her behavior is normal    Nursing note and vitals reviewed        Vital Signs  ED Triage Vitals   Temperature Pulse Respirations Blood Pressure SpO2   08/10/18 0143 08/10/18 0143 08/10/18 0143 08/10/18 0143 08/10/18 0143   97 8 °F (36 6 °C) 82 18 164/88 94 %      Temp Source Heart Rate Source Patient Position - Orthostatic VS BP Location FiO2 (%)   08/10/18 0143 08/10/18 0143 08/10/18 0143 08/10/18 0143 --   Oral Monitor Lying Right arm       Pain Score       08/10/18 0514       No Pain           Vitals:    08/11/18 0700 08/11/18 1100 08/11/18 1500 08/11/18 1900   BP: 147/69 138/68 143/71 144/76   Pulse: 75 70 72 71   Patient Position - Orthostatic VS: Lying Lying Lying Lying       Visual Acuity      ED Medications  Medications   amLODIPine (NORVASC) tablet 5 mg (5 mg Oral Given 8/11/18 0825)   aspirin chewable tablet 81 mg (81 mg Oral Given 8/11/18 0825)   levothyroxine tablet 100 mcg (100 mcg Oral Given 8/11/18 0611)   pravastatin (PRAVACHOL) tablet 80 mg (80 mg Oral Given 8/11/18 1708)   sodium chloride 0 9 % infusion (75 mL/hr Intravenous New Bag 8/11/18 0746)   oxyCODONE-acetaminophen (PERCOCET) 5-325 mg per tablet 1 tablet (not administered)   ondansetron (ZOFRAN-ODT) dispersible tablet 4 mg (not administered)   piperacillin-tazobactam (ZOSYN) 3 375 g in sodium chloride 0 9 % 50 mL IVPB ( Intravenous Canceled Entry 8/11/18 1815)   sodium chloride 0 9 % bolus 500 mL (0 mL Intravenous Stopped 8/10/18 0353)   famotidine (PEPCID) injection 20 mg (20 mg Intravenous Given 8/10/18 0253)   ondansetron (ZOFRAN) injection 4 mg (4 mg Intravenous Given 8/10/18 0253)   morphine injection 2 mg (2 mg Intravenous Given 8/10/18 0328)   sodium chloride 0 9 % infusion (0 mL/hr Intravenous Stopped 8/10/18 0747)   prochlorperazine (COMPAZINE) tablet 5 mg (5 mg Oral Given 8/10/18 2117)       Diagnostic Studies  Results Reviewed     Procedure Component Value Units Date/Time    Stool Enteric Bacterial Panel by PCR [[id number]]  (Normal) Collected:  08/10/18 0949    Lab Status:  Final result Specimen:  Stool from Per Rectum Updated:  08/11/18 1548     Salmonella sp PCR None Detected     Shigella sp/Enteroinvasive E  coli (EIEC) PCR None Detected     Campylobacter sp (jejuni and coli) PCR None Detected     Shiga toxin 1/Shiga tonix 2 genes PCR None Detected    Blood culture [[id number]] Collected:  08/10/18 0616    Lab Status:  Preliminary result Specimen:  Blood from Arm, Left Updated:  08/11/18 0901 Blood Culture No Growth at 24 hrs  Blood culture [[id number]] Collected:  08/10/18 6114    Lab Status:  Preliminary result Specimen:  Blood from Arm, Right Updated:  08/11/18 0901     Blood Culture No Growth at 24 hrs  Urine culture [[id number]] Collected:  08/10/18 0223    Lab Status:  Final result Specimen:  Urine from Urine, Clean Catch Updated:  08/11/18 0744     Urine Culture >100,000 cfu/ml     Clostridium difficile toxin by PCR [[id number]]  (Normal) Collected:  08/10/18 0949    Lab Status:  Final result Specimen:  Stool from Per Rectum Updated:  08/10/18 1409     C difficile toxin by PCR NEGATIVE for C difficle toxin by PCR  Ova and parasite examination [[id number]] Collected:  08/10/18 0949    Lab Status: In process Specimen:  Stool from Rectum Updated:  08/10/18 0953    Comprehensive metabolic panel [[id number]]  (Abnormal) Collected:  08/10/18 0221    Lab Status:  Final result Specimen:  Blood from Arm, Right Updated:  08/10/18 0244     Sodium 133 (L) mmol/L      Potassium 4 3 mmol/L      Chloride 98 (L) mmol/L      CO2 23 mmol/L      Anion Gap 12 mmol/L      BUN 26 (H) mg/dL      Creatinine 1 91 (H) mg/dL      Glucose 162 (H) mg/dL      Calcium 9 1 mg/dL      AST 20 U/L      ALT 20 U/L      Alkaline Phosphatase 96 U/L      Total Protein 7 1 g/dL      Albumin 3 1 (L) g/dL      Total Bilirubin 0 60 mg/dL      eGFR 24 ml/min/1 73sq m     Narrative:         National Kidney Disease Education Program recommendations are as follows:  GFR calculation is accurate only with a steady state creatinine  Chronic Kidney disease less than 60 ml/min/1 73 sq  meters  Kidney failure less than 15 ml/min/1 73 sq  meters      Lipase [[id number]]  (Normal) Collected:  08/10/18 0221    Lab Status:  Final result Specimen:  Blood from Arm, Right Updated:  08/10/18 0244     Lipase 108 u/L     Urine Microscopic [[id number]]  (Abnormal) Collected:  08/10/18 0223    Lab Status:  Final result Specimen:  Urine from Urine, Clean Catch Updated:  08/10/18 0235     RBC, UA 0-1 (A) /hpf      WBC, UA 10-20 (A) /hpf      Epithelial Cells Occasional /hpf      Bacteria, UA Occasional /hpf      Hyaline Casts, UA 2-4 (A) /lpf      MUCOUS THREADS Occasional (A)    UA w Reflex to Microscopic w Reflex to Culture [[id number]]  (Abnormal) Collected:  08/10/18 0223    Lab Status:  Final result Specimen:  Urine from Urine, Clean Catch Updated:  08/10/18 0228     Color, UA Irina     Clarity, UA Slightly Cloudy     Specific Gravity, UA >=1 030     pH, UA 5 5     Leukocytes, UA Small (A)     Nitrite, UA Negative     Protein, UA 30 (1+) (A) mg/dl      Glucose, UA Negative mg/dl      Ketones, UA Trace (A) mg/dl      Urobilinogen, UA 0 2 E U /dl      Bilirubin, UA Small (A)     Blood, UA Trace-lysed (A)    CBC and differential [[id number]]  (Abnormal) Collected:  08/10/18 0221    Lab Status:  Final result Specimen:  Blood from Arm, Right Updated:  08/10/18 0226     WBC 15 04 (H) Thousand/uL      RBC 5 92 (H) Million/uL      Hemoglobin 15 6 (H) g/dL      Hematocrit 47 9 (H) %      MCV 81 (L) fL      MCH 26 4 (L) pg      MCHC 32 6 g/dL      RDW 16 3 (H) %      MPV 11 3 fL      Platelets 070 Thousands/uL      nRBC 0 /100 WBCs      Neutrophils Relative 82 (H) %      Immat GRANS % 1 %      Lymphocytes Relative 9 (L) %      Monocytes Relative 8 %      Eosinophils Relative 0 %      Basophils Relative 0 %      Neutrophils Absolute 12 33 (H) Thousands/µL      Immature Grans Absolute 0 16 Thousand/uL      Lymphocytes Absolute 1 35 Thousands/µL      Monocytes Absolute 1 15 Thousand/µL      Eosinophils Absolute 0 03 Thousand/µL      Basophils Absolute 0 02 Thousands/µL                  CT abdomen pelvis wo contrast   Final Result by Raul Wilks DO (08/10 3194)      Thickening of the distal transverse colon and the descending colon with surrounding inflammatory changes  Findings likely represent colitis (infectious versus inflammatory less likely ischemic)    Follow-up with gastroenterology is recommended  Workstation performed: [id number]                    Procedures  Procedures       Phone Contacts  ED Phone Contact    ED Course           Identification of Seniors at Risk      Most Recent Value   (ISAR) Identification of Seniors at Risk   Before the illness or injury that brought you to the Emergency, did you need someone to help you on a regular basis? 0 Filed at: 08/10/2018 0147   In the last 24 hours, have you needed more help than usual?  0 Filed at: 08/10/2018 0147   Have you been hospitalized for one or more nights during the past 6 months? 0 Filed at: 08/10/2018 0147   In general, do you see well?  0 Filed at: 08/10/2018 0147   In general, do you have serious problems with your memory? 0 Filed at: 08/10/2018 0147   Do you take more than three different medications every day?   1 Filed at: 08/10/2018 0147   ISAR Score  1 Filed at: 08/10/2018 0147                          MDM  Number of Diagnoses or Management Options  KRISTYN (acute kidney injury) ([facility] ): new and requires workup  Dehydration: new and requires workup  Diarrhea: new and requires workup  Nausea & vomiting: new and requires workup     Amount and/or Complexity of Data Reviewed  Clinical lab tests: ordered and reviewed  Tests in the radiology section of CPT®: ordered and reviewed  Decide to obtain previous medical records or to obtain history from someone other than the patient: yes  Review and summarize past medical records: yes    Patient Progress  Patient progress: stable    CritCare Time    Disposition  Final diagnoses:   Nausea & vomiting   KRISTYN (acute kidney injury) ([facility] )   Diarrhea   Dehydration     Time reflects when diagnosis was documented in both MDM as applicable and the Disposition within this note     Time User Action Codes Description Comment    8/10/2018  4:53 AM Alisha Orellana Add [R11 2] Nausea & vomiting     8/10/2018  4:55 AM Alisha Orellana Add [N17 9] KRISTYN (acute kidney injury) ([facility] ) 8/10/2018  4:55 AM Eluterio Hatch Add [R19 7] Diarrhea     8/10/2018  4:55 AM Eluterio Hatch Add [E86 0] Dehydration     8/10/2018  5:34 AM Guy Mayberry Add [R10 84] Generalized abdominal pain     8/10/2018  5:34 AM Grace Salazar Modify [R10 84] Generalized abdominal pain     8/10/2018  5:34 AM Guy Ramirez Remove [R10 84] Generalized abdominal pain       ED Disposition     ED Disposition Condition Comment    Admit  Admit to medicine  Spoke to [facility], admit to service of Dr Nati Garcia  Follow-up Information    None         Current Discharge Medication List      CONTINUE these medications which have NOT CHANGED    Details   amLODIPine (NORVASC) 5 mg tablet Take by mouth      aspirin 81 MG tablet Take by mouth      levothyroxine 100 mcg tablet Take by mouth      lisinopril (ZESTRIL) 20 mg tablet Take by mouth      nystatin (MYCOSTATIN) ointment Nystatin 176189 UNIT/GM External Ointment  APPLY INCH  PRN   Refills: 0    Active      pravastatin (PRAVACHOL) 80 mg tablet Take by mouth           No discharge procedures on file      ED Provider  Electronically Signed by           Endy Quan DO  08/11/18 1288

## 2018-08-10 NOTE — CASE MANAGEMENT
OBS order   8/10 @ 1149 converted to IP   8/10  @ 1255      Admitting Physician Wyatt May A    Level of Care Med Surg    Estimated length of stay More than 2 Midnights    Certification I certify that inpatient services are medically necessary for this patient for a duration of greater than two midnights   See H&P and MD Progress Notes for additional information about the patient's course of treatment

## 2018-08-10 NOTE — H&P
H&P- [facility] [place] 1935, 82 y o  female MRN: [id number]    Unit/Bed#: -01 Encounter: [id number]    Primary Care Provider: John Pichardo MD   Date and time admitted to hospital: 8/10/2018  1:36 AM        Generalized abdominal pain   Assessment & Plan    Pre-hospital associated with nausea, vomiting, diarrhea  CT of the abdomen reveals thickening of distal transverse colon and descending colon with surrounding inflammatory changes possible colitis  Infectious versus inflammatory  UA reviewed, unremarkable  Will initiate Cipro, Flagyl  GI consult for further management  Comfort measures, IV fluids, antiemetic, pain management  NPO, ICE chips  KRISTYN (acute kidney injury) [facility])   Assessment & Plan    Present on admission, creatinine 1 91 likely secondary to acute dehydration in the setting of nausea vomiting diarrhea  Gentle hydration  Monitor creatinine if not improved consider nephrology consult for further management  Avoid nephrotoxin agent  Leukocytosis   Assessment & Plan    Present on admission, WBC greater than 15  Likely secondary to acute gastroenteritis versus colitis  Does not meet SIRS criteria  UA pending  Blood cultures  Initiate Flagyl and Cipro  Monitor WBC and elevated temp  Rash   Assessment & Plan    Right abdominal area, appears to be healed shingles  No clear vesicles noted  Pt denies pain or itching  Hypertension   Assessment & Plan    Hold lisinopril secondary to Kristyn  Continue amlodipine  Continue monitor          VTE Prophylaxis: Heparin  / sequential compression device   Code Status: full code  POLST: POLST form is not discussed and not completed at this time  Discussion with family:  No family at bedside    Anticipated Length of Stay:  Patient will be admitted on an Observation basis with an anticipated length of stay of  less than than 2 midnights     Justification for Hospital Stay:  Abdominal pain, nausea, vomiting, diarrhea GI consult    Total Time for Visit, including Counseling / Coordination of Care: 1 hour  Greater than 50% of this total time spent on direct patient counseling and coordination of care  Chief Complaint:   Abdominal pain, nausea vomiting diarrhea    History of Present Illness:    Ramiro Murcia is a 82 y o  female who presents with chief complaint of abdominal pain associated with nausea vomiting diarrhea  Review of Systems:    Review of Systems   Gastrointestinal: Positive for abdominal pain, diarrhea, nausea and vomiting  Skin: Positive for rash (right abdominal area  )  All other systems reviewed and are negative  Past Medical and Surgical History:     Past Medical History:   Diagnosis Date    Arthritis     Asthma     COPD (chronic obstructive pulmonary disease) ([facility] [address] )     Disease of thyroid gland     GERD (gastroesophageal reflux disease)     Hyperlipidemia     Hypertension     Renal disorder        Past Surgical History:   Procedure Laterality Date    ABDOMINAL SURGERY      APPENDECTOMY      BREAST SURGERY      Bilateral biopsys, benign    EYE SURGERY      TONSILLECTOMY         Meds/Allergies:    Prior to Admission medications    Medication Sig Start Date End Date Taking?  Authorizing Provider   amLODIPine (NORVASC) 5 mg tablet Take by mouth 3/21/14  Yes Historical Provider, MD   aspirin 81 MG tablet Take by mouth   Yes Historical Provider, MD   levothyroxine 100 mcg tablet Take by mouth 3/21/14  Yes Historical Provider, MD   lisinopril (ZESTRIL) 20 mg tablet Take by mouth 12/20/13  Yes Historical Provider, MD   nystatin (MYCOSTATIN) ointment Nystatin 807171 UNIT/GM External Ointment  APPLY INCH  PRN   Refills: 0    Active   Yes Historical Provider, MD   pravastatin (PRAVACHOL) 80 mg tablet Take by mouth    Historical Provider, MD   citalopram (CeleXA) 20 mg tablet Take by mouth 10/10/13 8/10/18  Historical Provider, MD   gabapentin (NEURONTIN) 100 mg capsule Take by mouth 11/4/16 8/10/18  Historical Provider, MD   oxyCODONE-acetaminophen (PERCOCET) 5-325 mg per tablet Take 1 tablet by mouth every 4 (four) hours as needed for moderate pain for up to 15 doses Max Daily Amount: 6 tablets 8/2/18 8/10/18  Myrl Jeans, MD   valACYclovir (VALTREX) 1,000 mg tablet Take 1 tablet (1,000 mg total) by mouth 3 (three) times a day for 7 days 8/2/18 8/10/18  Myrl Jeans, MD     I have reviewed home medications with patient personally  Allergies: Allergies   Allergen Reactions    Erythromycin Anaphylaxis, Rash and Tremor    Pravastatin Other (See Comments)       Social History:     Marital Status: Single   Occupation:  Retired  Patient Pre-hospital Living Situation:  Home  Patient Pre-hospital Level of Mobility:  Independent  Patient Pre-hospital Diet Restrictions:  None  Substance Use History:   History   Alcohol Use No     History   Smoking Status    Never Smoker   Smokeless Tobacco    Never Used     History   Drug Use No       Family History:    non-contributory    Physical Exam:     Vitals:   Blood Pressure: 151/71 (08/10/18 0530)  Pulse: 64 (08/10/18 0530)  Temperature: 97 8 °F (36 6 °C) (08/10/18 0530)  Temp Source: Oral (08/10/18 0530)  Respirations: 18 (08/10/18 0530)  Height: 4' 10" (147 3 cm) (08/10/18 0530)  Weight - Scale: 86 8 kg (191 lb 5 8 oz) (08/10/18 0530)  SpO2: 96 % (08/10/18 0530)    Physical Exam   Constitutional: She is oriented to person, place, and time  She appears well-developed and well-nourished  No distress  HENT:   Head: Normocephalic and atraumatic  Neck: Normal range of motion  Neck supple  Cardiovascular: Normal rate, regular rhythm and normal heart sounds  Pulmonary/Chest: Effort normal and breath sounds normal  No accessory muscle usage  No respiratory distress  Abdominal: Soft  Bowel sounds are normal  She exhibits no distension  There is generalized tenderness  Musculoskeletal: Normal range of motion   She exhibits no edema or tenderness  Neurological: She is alert and oriented to person, place, and time  Skin: Skin is warm and dry  Rash (abdominal area  ) noted  She is not diaphoretic  Psychiatric: She has a normal mood and affect  Her behavior is normal    Nursing note and vitals reviewed  Additional Data:     Lab Results: I have personally reviewed pertinent reports  Results from last 7 days  Lab Units 08/10/18  0221   WBC Thousand/uL 15 04*   HEMOGLOBIN g/dL 15 6*   HEMATOCRIT % 47 9*   PLATELETS Thousands/uL 284   NEUTROS PCT % 82*   LYMPHS PCT % 9*   MONOS PCT % 8   EOS PCT % 0       Results from last 7 days  Lab Units 08/10/18  0221   SODIUM mmol/L 133*   POTASSIUM mmol/L 4 3   CHLORIDE mmol/L 98*   CO2 mmol/L 23   BUN mg/dL 26*   CREATININE mg/dL 1 91*   CALCIUM mg/dL 9 1   TOTAL PROTEIN g/dL 7 1   BILIRUBIN TOTAL mg/dL 0 60   ALK PHOS U/L 96   ALT U/L 20   AST U/L 20   GLUCOSE RANDOM mg/dL 162*                   Imaging: I have personally reviewed pertinent reports  CT abdomen pelvis wo contrast   Final Result by Jaylan Barrow DO (08/10 6115)      Thickening of the distal transverse colon and the descending colon with surrounding inflammatory changes  Findings likely represent colitis (infectious versus inflammatory less likely ischemic)  Follow-up with gastroenterology is recommended  Workstation performed: [id number]             EKG, Pathology, and Other Studies Reviewed on Admission:   · EKG:  Non STEMI    Allscripts / Epic Records Reviewed: Yes     ** Please Note: This note has been constructed using a voice recognition system   **

## 2018-08-11 LAB
ANION GAP SERPL CALCULATED.3IONS-SCNC: 7 MMOL/L (ref 4–13)
BACTERIA UR CULT: NORMAL
BASOPHILS # BLD AUTO: 0.03 THOUSANDS/ΜL (ref 0–0.1)
BASOPHILS NFR BLD AUTO: 0 % (ref 0–1)
BUN SERPL-MCNC: 13 MG/DL (ref 5–25)
CALCIUM SERPL-MCNC: 7.9 MG/DL (ref 8.3–10.1)
CAMPYLOBACTER DNA SPEC NAA+PROBE: NORMAL
CHLORIDE SERPL-SCNC: 100 MMOL/L (ref 100–108)
CO2 SERPL-SCNC: 27 MMOL/L (ref 21–32)
CREAT SERPL-MCNC: 1.19 MG/DL (ref 0.6–1.3)
EOSINOPHIL # BLD AUTO: 0.17 THOUSAND/ΜL (ref 0–0.61)
EOSINOPHIL NFR BLD AUTO: 1 % (ref 0–6)
ERYTHROCYTE [DISTWIDTH] IN BLOOD BY AUTOMATED COUNT: 16.6 % (ref 11.6–15.1)
GFR SERPL CREATININE-BSD FRML MDRD: 43 ML/MIN/1.73SQ M
GLUCOSE SERPL-MCNC: 109 MG/DL (ref 65–140)
HCT VFR BLD AUTO: 42.2 % (ref 34.8–46.1)
HGB BLD-MCNC: 13.6 G/DL (ref 11.5–15.4)
IMM GRANULOCYTES # BLD AUTO: 0.09 THOUSAND/UL (ref 0–0.2)
IMM GRANULOCYTES NFR BLD AUTO: 1 % (ref 0–2)
LYMPHOCYTES # BLD AUTO: 1.91 THOUSANDS/ΜL (ref 0.6–4.47)
LYMPHOCYTES NFR BLD AUTO: 10 % (ref 14–44)
MAGNESIUM SERPL-MCNC: 1.5 MG/DL (ref 1.6–2.6)
MCH RBC QN AUTO: 26.8 PG (ref 26.8–34.3)
MCHC RBC AUTO-ENTMCNC: 32.2 G/DL (ref 31.4–37.4)
MCV RBC AUTO: 83 FL (ref 82–98)
MONOCYTES # BLD AUTO: 1.94 THOUSAND/ΜL (ref 0.17–1.22)
MONOCYTES NFR BLD AUTO: 10 % (ref 4–12)
NEUTROPHILS # BLD AUTO: 15.84 THOUSANDS/ΜL (ref 1.85–7.62)
NEUTS SEG NFR BLD AUTO: 78 % (ref 43–75)
NRBC BLD AUTO-RTO: 0 /100 WBCS
PLATELET # BLD AUTO: 204 THOUSANDS/UL (ref 149–390)
PMV BLD AUTO: 10.8 FL (ref 8.9–12.7)
POTASSIUM SERPL-SCNC: 3.7 MMOL/L (ref 3.5–5.3)
RBC # BLD AUTO: 5.07 MILLION/UL (ref 3.81–5.12)
SALMONELLA DNA SPEC QL NAA+PROBE: NORMAL
SHIGA TOXIN STX GENE SPEC NAA+PROBE: NORMAL
SHIGELLA DNA SPEC QL NAA+PROBE: NORMAL
SODIUM SERPL-SCNC: 134 MMOL/L (ref 136–145)
WBC # BLD AUTO: 19.98 THOUSAND/UL (ref 4.31–10.16)

## 2018-08-11 PROCEDURE — 80048 BASIC METABOLIC PNL TOTAL CA: CPT | Performed by: NURSE PRACTITIONER

## 2018-08-11 PROCEDURE — 99232 SBSQ HOSP IP/OBS MODERATE 35: CPT | Performed by: FAMILY MEDICINE

## 2018-08-11 PROCEDURE — 83735 ASSAY OF MAGNESIUM: CPT | Performed by: NURSE PRACTITIONER

## 2018-08-11 PROCEDURE — 99231 SBSQ HOSP IP/OBS SF/LOW 25: CPT | Performed by: INTERNAL MEDICINE

## 2018-08-11 PROCEDURE — 85025 COMPLETE CBC W/AUTO DIFF WBC: CPT | Performed by: NURSE PRACTITIONER

## 2018-08-11 RX ADMIN — SODIUM CHLORIDE 75 ML/HR: 0.9 INJECTION, SOLUTION INTRAVENOUS at 07:46

## 2018-08-11 RX ADMIN — PIPERACILLIN SODIUM,TAZOBACTAM SODIUM 3.38 G: 3; .375 INJECTION, POWDER, FOR SOLUTION INTRAVENOUS at 13:55

## 2018-08-11 RX ADMIN — LEVOTHYROXINE SODIUM 100 MCG: 100 TABLET ORAL at 06:11

## 2018-08-11 RX ADMIN — PRAVASTATIN SODIUM 80 MG: 80 TABLET ORAL at 17:08

## 2018-08-11 RX ADMIN — PIPERACILLIN SODIUM,TAZOBACTAM SODIUM 3.38 G: 3; .375 INJECTION, POWDER, FOR SOLUTION INTRAVENOUS at 17:09

## 2018-08-11 RX ADMIN — AMLODIPINE BESYLATE 5 MG: 5 TABLET ORAL at 08:25

## 2018-08-11 RX ADMIN — ASPIRIN 81 MG CHEWABLE TABLET 81 MG: 81 TABLET CHEWABLE at 08:25

## 2018-08-11 RX ADMIN — SODIUM CHLORIDE 75 ML/HR: 0.9 INJECTION, SOLUTION INTRAVENOUS at 06:19

## 2018-08-11 NOTE — PROGRESS NOTES
Called by nurse that patient was still nauseated inspite of Zofran administration  Patient states that she received no relief from the Zofran    Zofran DC'd Compazine ordered

## 2018-08-11 NOTE — ASSESSMENT & PLAN NOTE
Pre-hospital associated with nausea, vomiting, diarrhea  CT of the abdomen reveals thickening of distal transverse colon and descending colon with surrounding inflammatory changes possible colitis  Infectious versus inflammatory  Patient have a significant elevation of white blood cell count  She was initially started on Cipro and Flagyl  I will start the patient on Zosyn and follow closely    GI recommendation  Comfort measures, IV fluids, antiemetic, pain management

## 2018-08-11 NOTE — ASSESSMENT & PLAN NOTE
Improved  Present on admission, creatinine 1 91 likely secondary to acute dehydration in the setting of nausea vomiting diarrhea  Gentle hydration  Monitor creatinine if not improved consider nephrology consult for further management  Avoid nephrotoxin agent  Monitor kidney function

## 2018-08-11 NOTE — ASSESSMENT & PLAN NOTE
Present on admission, WBC greater than 15> 19 98  Likely secondary to acute gastroenteritis versus colitis  Monitor WBC and elevated temp

## 2018-08-11 NOTE — PROGRESS NOTES
[facility]  Progress Note - Bessy Leon 82 y o  female MRN: [id number]    Unit/Bed#: -01 Encounter: [id number]    Assessment/Plan:  28-year-old female past medical history of hypertension, hyperlipidemia, GERD presents with nausea/vomiting/hematochezia  She reports being lightheadedness and subsequently had episodes bright red blood per rectum associated clots  Last colonoscopy was 4-5 years ago  Currently hemoglobin stable  CT shows left-sided colitis  She denies any sick contact  White blood cell count admission was approximately 15 and now 19  But clinically she is improving  Colitis likely secondary to ischemic colitis- supportive care for now  Advance diet as tolerated  If she does not tolerate diet can consider decreasing fiber and dairy  No plan for colonoscopy evaluation at this time  Can consider colonoscopy evaluation as an outpatient in 3 months  We may plan for flex sig evaluation if white blood cell count does not normalized  Abdomen is benign on exam   Of note C diff and stool enteric bacteria are negative  Subjective:   She is tolerating p o  intake  Denies any nausea/vomiting/fever/chills at this time  Overall symptoms are improving  Objective:     Vitals: Blood pressure 138/68, pulse 70, temperature 97 8 °F (36 6 °C), temperature source Oral, resp  rate 18, height 4' 10" (1 473 m), weight 86 8 kg (191 lb 5 8 oz), SpO2 97 %, not currently breastfeeding  ,Body mass index is 39 99 kg/m²        Intake/Output Summary (Last 24 hours) at 08/11/18 1742  Last data filed at 08/11/18 1355   Gross per 24 hour   Intake             2377 ml   Output              250 ml   Net             2127 ml       Physical Exam:    GEN: wn/wd, NAD  HEENT: MMM, no cervical or supraclavicular LAD, anciteric  CV: RRR, no m/r/g  CHEST: CTA b/l, no w/r/r  ABD: +BS, soft, mild discomfort in lower abdominal quadrant, no hepatosplenomegaly  EXT: no c/c/e  SKIN: no rashes  NEURO: aaox3      Invasive Devices     Peripheral Intravenous Line            Peripheral IV 08/10/18 Right Antecubital 1 day                        Lab, Imaging and other studies:     Admission on 08/10/2018   Component Date Value    WBC 08/10/2018 15 04*    RBC 08/10/2018 5 92*    Hemoglobin 08/10/2018 15 6*    Hematocrit 08/10/2018 47 9*    MCV 08/10/2018 81*    MCH 08/10/2018 26 4*    MCHC 08/10/2018 32 6     RDW 08/10/2018 16 3*    MPV 08/10/2018 11 3     Platelets 76/26/6628 284     nRBC 08/10/2018 0     Neutrophils Relative 08/10/2018 82*    Immat GRANS % 08/10/2018 1     Lymphocytes Relative 08/10/2018 9*    Monocytes Relative 08/10/2018 8     Eosinophils Relative 08/10/2018 0     Basophils Relative 08/10/2018 0     Neutrophils Absolute 08/10/2018 12 33*    Immature Grans Absolute 08/10/2018 0 16     Lymphocytes Absolute 08/10/2018 1 35     Monocytes Absolute 08/10/2018 1 15     Eosinophils Absolute 08/10/2018 0 03     Basophils Absolute 08/10/2018 0 02     Sodium 08/10/2018 133*    Potassium 08/10/2018 4 3     Chloride 08/10/2018 98*    CO2 08/10/2018 23     Anion Gap 08/10/2018 12     BUN 08/10/2018 26*    Creatinine 08/10/2018 1 91*    Glucose 08/10/2018 162*    Calcium 08/10/2018 9 1     AST 08/10/2018 20     ALT 08/10/2018 20     Alkaline Phosphatase 08/10/2018 96     Total Protein 08/10/2018 7 1     Albumin 08/10/2018 3 1*    Total Bilirubin 08/10/2018 0 60     eGFR 08/10/2018 24     Lipase 08/10/2018 108     Extra Tube 08/10/2018 Hold for add-ons       Color, UA 08/10/2018 Irina     Clarity, UA 08/10/2018 Slightly Cloudy     Specific Gravity, UA 08/10/2018 >=1 030     pH, UA 08/10/2018 5 5     Leukocytes, UA 08/10/2018 Small*    Nitrite, UA 08/10/2018 Negative     Protein, UA 08/10/2018 30 (1+)*    Glucose, UA 08/10/2018 Negative     Ketones, UA 08/10/2018 Trace*    Urobilinogen, UA 08/10/2018 0 2     Bilirubin, UA 08/10/2018 Small*    Blood, UA 08/10/2018 Trace-lysed*    RBC, UA 08/10/2018 0-1*    WBC, UA 08/10/2018 10-20*    Epithelial Cells 08/10/2018 Occasional     Bacteria, UA 08/10/2018 Occasional     Hyaline Casts, UA 08/10/2018 2-4*    MUCOUS THREADS 08/10/2018 Occasional*    Urine Culture 08/10/2018 >100,000 cfu/ml      C difficile toxin by PCR 08/10/2018 NEGATIVE for C difficle toxin by PCR   Salmonella sp PCR 08/10/2018 None Detected     Shigella sp/Enteroinvasi* 08/10/2018 None Detected     Campylobacter sp (jejuni* 08/10/2018 None Detected     Shiga toxin 1/Shiga tasha* 08/10/2018 None Detected     Blood Culture 08/10/2018 No Growth at 24 hrs   Blood Culture 08/10/2018 No Growth at 24 hrs       LACTIC ACID 08/10/2018 1 0     Hemoglobin 08/10/2018 14 8     Hematocrit 08/10/2018 45 2     Ventricular Rate 08/10/2018 69     Atrial Rate 08/10/2018 69     MA Interval 08/10/2018 144     QRSD Interval 08/10/2018 86     QT Interval 08/10/2018 414     QTC Interval 08/10/2018 443     P Axis 08/10/2018 67     QRS Axis 08/10/2018 24     T Wave Axis 08/10/2018 54     Sodium 08/11/2018 134*    Potassium 08/11/2018 3 7     Chloride 08/11/2018 100     CO2 08/11/2018 27     Anion Gap 08/11/2018 7     BUN 08/11/2018 13     Creatinine 08/11/2018 1 19     Glucose 08/11/2018 109     Calcium 08/11/2018 7 9*    eGFR 08/11/2018 43     Magnesium 08/11/2018 1 5*    WBC 08/11/2018 19 98*    RBC 08/11/2018 5 07     Hemoglobin 08/11/2018 13 6     Hematocrit 08/11/2018 42 2     MCV 08/11/2018 83     MCH 08/11/2018 26 8     MCHC 08/11/2018 32 2     RDW 08/11/2018 16 6*    MPV 08/11/2018 10 8     Platelets 85/58/8700 204     nRBC 08/11/2018 0     Neutrophils Relative 08/11/2018 78*    Immat GRANS % 08/11/2018 1     Lymphocytes Relative 08/11/2018 10*    Monocytes Relative 08/11/2018 10     Eosinophils Relative 08/11/2018 1     Basophils Relative 08/11/2018 0     Neutrophils Absolute 08/11/2018 15 84*    Immature Grans Absolute 08/11/2018 0 09     Lymphocytes Absolute 08/11/2018 1 91     Monocytes Absolute 08/11/2018 1 94*    Eosinophils Absolute 08/11/2018 0 17     Basophils Absolute 08/11/2018 0 03          I have personally reviewed pertinent films in PACS    Current Facility-Administered Medications   Medication Dose Route Frequency    amLODIPine (NORVASC) tablet 5 mg  5 mg Oral Daily    aspirin chewable tablet 81 mg  81 mg Oral Daily    levothyroxine tablet 100 mcg  100 mcg Oral Early Morning    ondansetron (ZOFRAN-ODT) dispersible tablet 4 mg  4 mg Oral Q6H PRN    oxyCODONE-acetaminophen (PERCOCET) 5-325 mg per tablet 1 tablet  1 tablet Oral Q4H PRN    piperacillin-tazobactam (ZOSYN) 3 375 g in sodium chloride 0 9 % 50 mL IVPB  3 375 g Intravenous Q6H    pravastatin (PRAVACHOL) tablet 80 mg  80 mg Oral Daily With Dinner    sodium chloride 0 9 % infusion  75 mL/hr Intravenous Continuous

## 2018-08-11 NOTE — PROGRESS NOTES
Progress Note - Christa Kieran 1935, 82 y o  female MRN: [id number]    Unit/Bed#: -01 Encounter: [id number]    Primary Care Provider: Maryellen Smyth MD   Date and time admitted to hospital: 8/10/2018  1:36 AM        * Generalized abdominal pain   Assessment & Plan    Pre-hospital associated with nausea, vomiting, diarrhea  CT of the abdomen reveals thickening of distal transverse colon and descending colon with surrounding inflammatory changes possible colitis  Infectious versus inflammatory  Patient have a significant elevation of white blood cell count  She was initially started on Cipro and Flagyl  I will start the patient on Zosyn and follow closely    GI recommendation  Comfort measures, IV fluids, antiemetic, pain management  Hematuria   Assessment & Plan    Close monitor         Rash   Assessment & Plan    Right abdominal area, appears to be healed shingles  No clear vesicles noted  Pt denies pain or itching  KRISTYN (acute kidney injury) [facility])   Assessment & Plan    Improved  Present on admission, creatinine 1 91 likely secondary to acute dehydration in the setting of nausea vomiting diarrhea  Gentle hydration  Monitor creatinine if not improved consider nephrology consult for further management  Avoid nephrotoxin agent  Monitor kidney function  Leukocytosis   Assessment & Plan    Present on admission, WBC greater than 15> 19 98  Likely secondary to acute gastroenteritis versus colitis  Monitor WBC and elevated temp  Vomiting   Assessment & Plan    improved          VTE Pharmacologic Prophylaxis:   Pharmacologic: Pharmacologic VTE Prophylaxis contraindicated due to GI bleed  Mechanical VTE Prophylaxis in Place: Yes    Patient Centered Rounds: I have performed bedside rounds with nursing staff today      Discussions with Specialists or Other Care Team Provider:     Education and Discussions with Family / Patient:  Patient    Time Spent for Care: 20 minutes  More than 50% of total time spent on counseling and coordination of care as described above  Current Length of Stay: 1 day(s)    Current Patient Status: Inpatient   Certification Statement: The patient will continue to require additional inpatient hospital stay due to Abdominal pain on IV antibiotic    Discharge Plan:  Depends on clinical course, not stable to be discharged    Code Status: Level 1 - Full Code      Subjective:   Patient states abdominal pain has been better better  She has an IV diet she is on she requested  Diet to be changes  She denies any fever nausea vomiting this point    Objective:     Vitals:   Temp (24hrs), Av °F (36 7 °C), Min:97 8 °F (36 6 °C), Max:98 2 °F (36 8 °C)    HR:  [61-79] 70  Resp:  [18-19] 18  BP: (138-170)/(68-88) 138/68  SpO2:  [92 %-97 %] 97 %  Body mass index is 39 99 kg/m²  Input and Output Summary (last 24 hours): Intake/Output Summary (Last 24 hours) at 18 1156  Last data filed at 18 0900   Gross per 24 hour   Intake             1854 ml   Output              200 ml   Net             1654 ml       Physical Exam:     Physical Exam   Constitutional: She is oriented to person, place, and time  No distress  Neck: No JVD present  No thyromegaly present  Cardiovascular: Normal rate, normal heart sounds and intact distal pulses  Exam reveals no gallop  No murmur heard  Pulmonary/Chest: No respiratory distress  She has no wheezes  She has no rales  She exhibits no tenderness  Abdominal: She exhibits no distension  There is tenderness  There is guarding  There is no rebound  Musculoskeletal: She exhibits no edema, tenderness or deformity  Lymphadenopathy:     She has no cervical adenopathy  Neurological: She is alert and oriented to person, place, and time  She has normal reflexes  No cranial nerve deficit  Coordination normal    Skin: Skin is warm  She is not diaphoretic     Psychiatric: She has a normal mood and affect  Additional Data:     Labs:      Results from last 7 days  Lab Units 08/11/18  0435   WBC Thousand/uL 19 98*   HEMOGLOBIN g/dL 13 6   HEMATOCRIT % 42 2   PLATELETS Thousands/uL 204   NEUTROS PCT % 78*   LYMPHS PCT % 10*   MONOS PCT % 10   EOS PCT % 1       Results from last 7 days  Lab Units 08/11/18  0435 08/10/18  0221   SODIUM mmol/L 134* 133*   POTASSIUM mmol/L 3 7 4 3   CHLORIDE mmol/L 100 98*   CO2 mmol/L 27 23   BUN mg/dL 13 26*   CREATININE mg/dL 1 19 1 91*   CALCIUM mg/dL 7 9* 9 1   TOTAL PROTEIN g/dL  --  7 1   BILIRUBIN TOTAL mg/dL  --  0 60   ALK PHOS U/L  --  96   ALT U/L  --  20   AST U/L  --  20   GLUCOSE RANDOM mg/dL 109 162*           * I Have Reviewed All Lab Data Listed Above  * Additional Pertinent Lab Tests Reviewed: All Labs Within Last 24 Hours Reviewed    Imaging:    Imaging Reports Reviewed Today Include:   Imaging Personally Reviewed by Myself Includes:      Recent Cultures (last 7 days):       Results from last 7 days  Lab Units 08/10/18  0949 08/10/18  0616 08/10/18  0611 08/10/18  0223   BLOOD CULTURE   --  No Growth at 24 hrs   No Growth at 24 hrs   --    URINE CULTURE   --   --   --  >100,000 cfu/ml    C DIFF TOXIN B  NEGATIVE for C difficle toxin by PCR    --   --   --        Last 24 Hours Medication List:     Current Facility-Administered Medications:  amLODIPine 5 mg Oral Daily DANIELA Ruano    aspirin 81 mg Oral Daily DANIELA Ruano    levothyroxine 100 mcg Oral Early Morning DANIELA Ruano    ondansetron 4 mg Oral Q6H PRN [place], DANIELA    oxyCODONE-acetaminophen 1 tablet Oral Q4H PRN Titus Bhakta MD    piperacillin-tazobactam 3 375 g Intravenous Q6H Titus Bhakta MD    pravastatin 80 mg Oral Daily With Con-wayDANIELA    sodium chloride 75 mL/hr Intravenous Continuous Vinnie Cast PA-C Last Rate: 75 mL/hr (08/11/18 0746)        Today, Patient Was Seen By: Titus Bhakta MD    ** Please Note: Dragon 360 Dictation voice to text software may have been used in the creation of this document   **

## 2018-08-12 LAB
BASOPHILS # BLD AUTO: 0.07 THOUSANDS/ΜL (ref 0–0.1)
BASOPHILS NFR BLD AUTO: 0 % (ref 0–1)
EOSINOPHIL # BLD AUTO: 0.27 THOUSAND/ΜL (ref 0–0.61)
EOSINOPHIL NFR BLD AUTO: 1 % (ref 0–6)
ERYTHROCYTE [DISTWIDTH] IN BLOOD BY AUTOMATED COUNT: 17 % (ref 11.6–15.1)
HCT VFR BLD AUTO: 42.8 % (ref 34.8–46.1)
HGB BLD-MCNC: 13.5 G/DL (ref 11.5–15.4)
IMM GRANULOCYTES # BLD AUTO: 0.17 THOUSAND/UL (ref 0–0.2)
IMM GRANULOCYTES NFR BLD AUTO: 1 % (ref 0–2)
LYMPHOCYTES # BLD AUTO: 2.24 THOUSANDS/ΜL (ref 0.6–4.47)
LYMPHOCYTES NFR BLD AUTO: 12 % (ref 14–44)
MCH RBC QN AUTO: 26.8 PG (ref 26.8–34.3)
MCHC RBC AUTO-ENTMCNC: 31.5 G/DL (ref 31.4–37.4)
MCV RBC AUTO: 85 FL (ref 82–98)
MONOCYTES # BLD AUTO: 1.16 THOUSAND/ΜL (ref 0.17–1.22)
MONOCYTES NFR BLD AUTO: 6 % (ref 4–12)
NEUTROPHILS # BLD AUTO: 15.17 THOUSANDS/ΜL (ref 1.85–7.62)
NEUTS SEG NFR BLD AUTO: 80 % (ref 43–75)
NRBC BLD AUTO-RTO: 0 /100 WBCS
PLATELET # BLD AUTO: 223 THOUSANDS/UL (ref 149–390)
PMV BLD AUTO: 11.2 FL (ref 8.9–12.7)
RBC # BLD AUTO: 5.03 MILLION/UL (ref 3.81–5.12)
WBC # BLD AUTO: 19.08 THOUSAND/UL (ref 4.31–10.16)

## 2018-08-12 PROCEDURE — 99232 SBSQ HOSP IP/OBS MODERATE 35: CPT | Performed by: FAMILY MEDICINE

## 2018-08-12 PROCEDURE — 85025 COMPLETE CBC W/AUTO DIFF WBC: CPT | Performed by: FAMILY MEDICINE

## 2018-08-12 RX ORDER — LISINOPRIL 20 MG/1
20 TABLET ORAL DAILY
Status: DISCONTINUED | OUTPATIENT
Start: 2018-08-12 | End: 2018-08-15 | Stop reason: HOSPADM

## 2018-08-12 RX ADMIN — PIPERACILLIN SODIUM,TAZOBACTAM SODIUM 3.38 G: 3; .375 INJECTION, POWDER, FOR SOLUTION INTRAVENOUS at 23:22

## 2018-08-12 RX ADMIN — ASPIRIN 81 MG CHEWABLE TABLET 81 MG: 81 TABLET CHEWABLE at 08:10

## 2018-08-12 RX ADMIN — LISINOPRIL 20 MG: 20 TABLET ORAL at 12:57

## 2018-08-12 RX ADMIN — SODIUM CHLORIDE 75 ML/HR: 0.9 INJECTION, SOLUTION INTRAVENOUS at 02:01

## 2018-08-12 RX ADMIN — AMLODIPINE BESYLATE 5 MG: 5 TABLET ORAL at 08:10

## 2018-08-12 RX ADMIN — PIPERACILLIN SODIUM,TAZOBACTAM SODIUM 3.38 G: 3; .375 INJECTION, POWDER, FOR SOLUTION INTRAVENOUS at 01:55

## 2018-08-12 RX ADMIN — LEVOTHYROXINE SODIUM 100 MCG: 100 TABLET ORAL at 06:57

## 2018-08-12 RX ADMIN — PIPERACILLIN SODIUM,TAZOBACTAM SODIUM 3.38 G: 3; .375 INJECTION, POWDER, FOR SOLUTION INTRAVENOUS at 07:33

## 2018-08-12 RX ADMIN — PIPERACILLIN SODIUM,TAZOBACTAM SODIUM 3.38 G: 3; .375 INJECTION, POWDER, FOR SOLUTION INTRAVENOUS at 17:45

## 2018-08-12 RX ADMIN — PRAVASTATIN SODIUM 80 MG: 80 TABLET ORAL at 17:45

## 2018-08-12 RX ADMIN — PIPERACILLIN SODIUM,TAZOBACTAM SODIUM 3.38 G: 3; .375 INJECTION, POWDER, FOR SOLUTION INTRAVENOUS at 12:57

## 2018-08-12 NOTE — PROGRESS NOTES
Progress Note - Jaymie Moseley 1935, 82 y o  female MRN: [id number]    Unit/Bed#: -01 Encounter: [id number]    Primary Care Provider: Bar Schulz MD   Date and time admitted to hospital: 8/10/2018  1:36 AM        * Generalized abdominal pain   Assessment & Plan    Pre-hospital associated with nausea, vomiting, diarrhea  CT of the abdomen reveals thickening of distal transverse colon and descending colon with surrounding inflammatory changes possible colitis  Infectious versus inflammatory  Patient have a significant elevation of white blood cell count yesterday  She was initially started on Cipro and Flagyl  Continue on Zosyn#2 and follow closely    CBC   GI recommendation  Comfort measures, IV fluids, antiemetic, pain management  Hematuria   Assessment & Plan    Close monitor         Rash   Assessment & Plan    Right abdominal area, appears to be healed shingles  No clear vesicles noted  Pt denies pain or itching  KRISTYN (acute kidney injury) [facility])   Assessment & Plan    Improved  Present on admission, creatinine 1 91 likely secondary to acute dehydration in the setting of nausea vomiting diarrhea  Gentle hydration  Monitor creatinine if not improved consider nephrology consult for further management  Avoid nephrotoxin agent  Monitor kidney function  Leukocytosis   Assessment & Plan    Present on admission, WBC greater than 15> 19 98 yesterday  Likely secondary to acute gastroenteritis versus colitis  Monitor WBC and elevated temp  Hypertension   Assessment & Plan    Restart Lisinopril   Continue amlodipine  Continue monitor        Vomiting   Assessment & Plan    improved          VTE Pharmacologic Prophylaxis:   Pharmacologic: Pharmacologic VTE Prophylaxis contraindicated due to Hematuria  Mechanical VTE Prophylaxis in Place: Yes    Patient Centered Rounds: I have performed bedside rounds with nursing staff today      Discussions with Specialists or Other Care Team Provider:     Education and Discussions with Family / Patient:  Patient    Time Spent for Care: 20 minutes  More than 50% of total time spent on counseling and coordination of care as described above  Current Length of Stay: 2 day(s)    Current Patient Status: Inpatient   Certification Statement: The patient will continue to require additional inpatient hospital stay due to Abdominal pain on IV antibiotic    Discharge Plan:  Likely in the next 24 hours, depends on clinical course    Code Status: Level 1 - Full Code      Subjective:   Some abdomina pain after eating yesterday, no more diarrhea, nausea or vomiting    Objective:     Vitals:   Temp (24hrs), Av 1 °F (36 7 °C), Min:97 6 °F (36 4 °C), Max:98 4 °F (36 9 °C)    HR:  [71-77] 71  Resp:  [17-18] 18  BP: (131-149)/(67-76) 133/67  SpO2:  [94 %-98 %] 94 %  Body mass index is 39 99 kg/m²  Input and Output Summary (last 24 hours): Intake/Output Summary (Last 24 hours) at 18 1135  Last data filed at 18 0901   Gross per 24 hour   Intake             2100 ml   Output             1450 ml   Net              650 ml       Physical Exam:     Physical Exam   Constitutional: She is oriented to person, place, and time  No distress  Neck: No JVD present  No tracheal deviation present  No thyromegaly present  Cardiovascular: Normal rate, regular rhythm, normal heart sounds and intact distal pulses  Exam reveals no gallop  No murmur heard  Pulmonary/Chest: No respiratory distress  She has no wheezes  She has no rales  She exhibits no tenderness  Abdominal: She exhibits no distension  There is tenderness  There is guarding  Musculoskeletal: She exhibits no edema or tenderness  Lymphadenopathy:     She has no cervical adenopathy  Neurological: She is alert and oriented to person, place, and time  She has normal reflexes  No cranial nerve deficit  Coordination normal    Skin: Skin is warm   She is not diaphoretic  Psychiatric: She has a normal mood and affect  Additional Data:     Labs:      Results from last 7 days  Lab Units 08/11/18  0435   WBC Thousand/uL 19 98*   HEMOGLOBIN g/dL 13 6   HEMATOCRIT % 42 2   PLATELETS Thousands/uL 204   NEUTROS PCT % 78*   LYMPHS PCT % 10*   MONOS PCT % 10   EOS PCT % 1       Results from last 7 days  Lab Units 08/11/18  0435 08/10/18  0221   SODIUM mmol/L 134* 133*   POTASSIUM mmol/L 3 7 4 3   CHLORIDE mmol/L 100 98*   CO2 mmol/L 27 23   BUN mg/dL 13 26*   CREATININE mg/dL 1 19 1 91*   CALCIUM mg/dL 7 9* 9 1   TOTAL PROTEIN g/dL  --  7 1   BILIRUBIN TOTAL mg/dL  --  0 60   ALK PHOS U/L  --  96   ALT U/L  --  20   AST U/L  --  20   GLUCOSE RANDOM mg/dL 109 162*           * I Have Reviewed All Lab Data Listed Above  * Additional Pertinent Lab Tests Reviewed: All Labs Within Last 24 Hours Reviewed    Imaging:    Imaging Reports Reviewed Today Include:   Imaging Personally Reviewed by Myself Includes:      Recent Cultures (last 7 days):       Results from last 7 days  Lab Units 08/10/18  0949 08/10/18  0616 08/10/18  0611 08/10/18  0223   BLOOD CULTURE   --  No Growth at 48 hrs  No Growth at 48 hrs    --    URINE CULTURE   --   --   --  >100,000 cfu/ml    C DIFF TOXIN B  NEGATIVE for C difficle toxin by PCR    --   --   --        Last 24 Hours Medication List:     Current Facility-Administered Medications:  amLODIPine 5 mg Oral Daily DANIELA Ruano    aspirin 81 mg Oral Daily DANIELA Ruano    levothyroxine 100 mcg Oral Early Morning DANIELA Ruano    lisinopril 20 mg Oral Daily Amauri Granda MD    ondansetron 4 mg Oral Q6H PRN DANIELA Larson    oxyCODONE-acetaminophen 1 tablet Oral Q4H PRN Amauri Granda MD    piperacillin-tazobactam 3 375 g Intravenous Q6H Amauri Granda MD Last Rate: 3 375 g (08/12/18 0733)   pravastatin 80 mg Oral Daily With [employer], CRNP    sodium chloride 75 mL/hr Intravenous Mary Cantor PA-C Last Rate: 75 mL/hr (08/12/18 0201)        Today, Patient Was Seen By: Erik Holland MD    ** Please Note: Dragon 360 Dictation voice to text software may have been used in the creation of this document   **

## 2018-08-12 NOTE — ASSESSMENT & PLAN NOTE
Pre-hospital associated with nausea, vomiting, diarrhea  CT of the abdomen reveals thickening of distal transverse colon and descending colon with surrounding inflammatory changes possible colitis  Infectious versus inflammatory  Patient have a significant elevation of white blood cell count yesterday  She was initially started on Cipro and Flagyl  Continue on Zosyn#2 and follow closely    CBC   GI recommendation  Comfort measures, IV fluids, antiemetic, pain management

## 2018-08-12 NOTE — ASSESSMENT & PLAN NOTE
Present on admission, WBC greater than 15> 19 98 yesterday  Likely secondary to acute gastroenteritis versus colitis  Monitor WBC and elevated temp

## 2018-08-13 LAB
BASOPHILS # BLD AUTO: 0.04 THOUSANDS/ΜL (ref 0–0.1)
BASOPHILS NFR BLD AUTO: 0 % (ref 0–1)
EOSINOPHIL # BLD AUTO: 0.28 THOUSAND/ΜL (ref 0–0.61)
EOSINOPHIL NFR BLD AUTO: 2 % (ref 0–6)
ERYTHROCYTE [DISTWIDTH] IN BLOOD BY AUTOMATED COUNT: 16.8 % (ref 11.6–15.1)
HCT VFR BLD AUTO: 41.1 % (ref 34.8–46.1)
HGB BLD-MCNC: 13 G/DL (ref 11.5–15.4)
IMM GRANULOCYTES # BLD AUTO: 0.11 THOUSAND/UL (ref 0–0.2)
IMM GRANULOCYTES NFR BLD AUTO: 1 % (ref 0–2)
LYMPHOCYTES # BLD AUTO: 1.95 THOUSANDS/ΜL (ref 0.6–4.47)
LYMPHOCYTES NFR BLD AUTO: 13 % (ref 14–44)
MCH RBC QN AUTO: 26.8 PG (ref 26.8–34.3)
MCHC RBC AUTO-ENTMCNC: 31.6 G/DL (ref 31.4–37.4)
MCV RBC AUTO: 85 FL (ref 82–98)
MONOCYTES # BLD AUTO: 0.94 THOUSAND/ΜL (ref 0.17–1.22)
MONOCYTES NFR BLD AUTO: 6 % (ref 4–12)
NEUTROPHILS # BLD AUTO: 12.14 THOUSANDS/ΜL (ref 1.85–7.62)
NEUTS SEG NFR BLD AUTO: 78 % (ref 43–75)
NRBC BLD AUTO-RTO: 0 /100 WBCS
PLATELET # BLD AUTO: 204 THOUSANDS/UL (ref 149–390)
PMV BLD AUTO: 11.4 FL (ref 8.9–12.7)
RBC # BLD AUTO: 4.85 MILLION/UL (ref 3.81–5.12)
WBC # BLD AUTO: 15.46 THOUSAND/UL (ref 4.31–10.16)

## 2018-08-13 PROCEDURE — 99232 SBSQ HOSP IP/OBS MODERATE 35: CPT | Performed by: INTERNAL MEDICINE

## 2018-08-13 PROCEDURE — 99232 SBSQ HOSP IP/OBS MODERATE 35: CPT | Performed by: FAMILY MEDICINE

## 2018-08-13 PROCEDURE — 85025 COMPLETE CBC W/AUTO DIFF WBC: CPT | Performed by: FAMILY MEDICINE

## 2018-08-13 RX ORDER — PANTOPRAZOLE SODIUM 40 MG/1
40 TABLET, DELAYED RELEASE ORAL
Status: DISCONTINUED | OUTPATIENT
Start: 2018-08-14 | End: 2018-08-15 | Stop reason: HOSPADM

## 2018-08-13 RX ORDER — OMEPRAZOLE 20 MG/1
20 CAPSULE, DELAYED RELEASE ORAL 2 TIMES DAILY
COMMUNITY

## 2018-08-13 RX ADMIN — POLYETHYLENE GLYCOL 3350, SODIUM SULFATE ANHYDROUS, SODIUM BICARBONATE, SODIUM CHLORIDE, POTASSIUM CHLORIDE 4000 ML: 236; 22.74; 6.74; 5.86; 2.97 POWDER, FOR SOLUTION ORAL at 15:19

## 2018-08-13 RX ADMIN — PIPERACILLIN SODIUM,TAZOBACTAM SODIUM 3.38 G: 3; .375 INJECTION, POWDER, FOR SOLUTION INTRAVENOUS at 17:34

## 2018-08-13 RX ADMIN — LISINOPRIL 20 MG: 20 TABLET ORAL at 08:36

## 2018-08-13 RX ADMIN — ASPIRIN 81 MG CHEWABLE TABLET 81 MG: 81 TABLET CHEWABLE at 08:36

## 2018-08-13 RX ADMIN — PRAVASTATIN SODIUM 80 MG: 80 TABLET ORAL at 17:34

## 2018-08-13 RX ADMIN — AMLODIPINE BESYLATE 5 MG: 5 TABLET ORAL at 08:36

## 2018-08-13 RX ADMIN — LEVOTHYROXINE SODIUM 100 MCG: 100 TABLET ORAL at 05:38

## 2018-08-13 RX ADMIN — PIPERACILLIN SODIUM,TAZOBACTAM SODIUM 3.38 G: 3; .375 INJECTION, POWDER, FOR SOLUTION INTRAVENOUS at 05:38

## 2018-08-13 RX ADMIN — PIPERACILLIN SODIUM,TAZOBACTAM SODIUM 3.38 G: 3; .375 INJECTION, POWDER, FOR SOLUTION INTRAVENOUS at 11:51

## 2018-08-13 NOTE — ASSESSMENT & PLAN NOTE
Improved  Present on admission, creatinine 1 91 likely secondary to acute dehydration in the setting of nausea vomiting diarrhea  Avoid nephrotoxin agent

## 2018-08-13 NOTE — ASSESSMENT & PLAN NOTE
Present on admission, WBC greater than 15> 19 98 some improvement today 15 46  Likely secondary to acute colitis  Monitor WBC and elevated temp    CBC AM

## 2018-08-13 NOTE — PLAN OF CARE
CARDIOVASCULAR - ADULT     Maintains optimal cardiac output and hemodynamic stability Progressing     Absence of cardiac dysrhythmias or at baseline rhythm Progressing        DISCHARGE PLANNING     Discharge to home or other facility with appropriate resources Progressing        GASTROINTESTINAL - ADULT     Minimal or absence of nausea and/or vomiting Progressing     Maintains or returns to baseline bowel function Progressing     Maintains adequate nutritional intake Progressing        HEMATOLOGIC - ADULT     Maintains hematologic stability Progressing        INFECTION - ADULT     Absence or prevention of progression during hospitalization Progressing        Knowledge Deficit     Patient/family/caregiver demonstrates understanding of disease process, treatment plan, medications, and discharge instructions Progressing        Nutrition/Hydration-ADULT     Nutrient/Hydration intake appropriate for improving, restoring or maintaining nutritional needs Progressing        PAIN - ADULT     Verbalizes/displays adequate comfort level or baseline comfort level Progressing        Potential for Falls     Patient will remain free of falls Progressing        SAFETY ADULT     Maintain or return to baseline ADL function Progressing     Maintain or return mobility status to optimal level Progressing

## 2018-08-13 NOTE — PROGRESS NOTES
Progress Note - Ayana Prasad 1935, 82 y o  female MRN: [id number]    Unit/Bed#: -01 Encounter: [id number]    Primary Care Provider: Elisa Fernandez MD   Date and time admitted to hospital: 8/10/2018  1:36 AM        * Generalized abdominal pain   Assessment & Plan    Pre-hospital associated with nausea, vomiting, diarrhea  CT of the abdomen reveals thickening of distal transverse colon and descending colon with surrounding inflammatory changes possible colitis  Infectious versus inflammatory  She was initially started on Cipro and Flagyl  Discontinue Zosyn#3 and follow closely    Significant improvement of white blood cell count  If white blood cell count improved further, consider discharging patient tomorrow on p o  Augmentin              Hematuria   Assessment & Plan    Close monitor         Rash   Assessment & Plan    Right abdominal area, appears to be healed shingles  No clear vesicles noted  Pt denies pain or itching  KRISTYN (acute kidney injury) [facility])   Assessment & Plan    Improved  Present on admission, creatinine 1 91 likely secondary to acute dehydration in the setting of nausea vomiting diarrhea  Avoid nephrotoxin agent  Leukocytosis   Assessment & Plan    Present on admission, WBC greater than 15> 19 98 some improvement today 15 46  Likely secondary to acute colitis  Monitor WBC and elevated temp  CBC AM          Hypertension   Assessment & Plan    Continue Lisinopril   Continue amlodipine  Continue monitor        Vomiting   Assessment & Plan    improved          VTE Pharmacologic Prophylaxis:   Pharmacologic: Pharmacologic VTE Prophylaxis contraindicated due to Hematuria  Mechanical VTE Prophylaxis in Place: Yes    Patient Centered Rounds: I have performed bedside rounds with nursing staff today  Discussions with Specialists or Other Care Team Provider:     Education and Discussions with Family / Patient:  Patient    Time Spent for Care: 20 minutes  More than 50% of total time spent on counseling and coordination of care as described above  Current Length of Stay: 3 day(s)    Current Patient Status: Inpatient   Certification Statement: The patient will continue to require additional inpatient hospital stay due to Acute above condition on IV antibiotic    Discharge Plan:  Likely in the next 24 hr    Code Status: Level 1 - Full Code      Subjective:   Patient she states abdominal pain has been improving, just lower abdomen still hurt  No more diarrhea or hematuria    Objective:     Vitals:   Temp (24hrs), Av 2 °F (36 8 °C), Min:97 9 °F (36 6 °C), Max:98 4 °F (36 9 °C)    HR:  [63-77] 63  Resp:  [17-18] 18  BP: (121-152)/(66-71) 152/69  SpO2:  [94 %-96 %] 96 %  Body mass index is 39 99 kg/m²  Input and Output Summary (last 24 hours): Intake/Output Summary (Last 24 hours) at 18 1050  Last data filed at 18 0900   Gross per 24 hour   Intake             1180 ml   Output             2550 ml   Net            -1370 ml       Physical Exam:     Physical Exam   Constitutional: She is oriented to person, place, and time  No distress  Neck: No JVD present  No tracheal deviation present  No thyromegaly present  Cardiovascular: Normal rate, regular rhythm, normal heart sounds and intact distal pulses  Exam reveals no gallop and no friction rub  No murmur heard  Pulmonary/Chest: No respiratory distress  She has no wheezes  She has no rales  She exhibits no tenderness  Abdominal: She exhibits no distension  There is tenderness  There is guarding  There is no rebound  Lower abdomen    Musculoskeletal: She exhibits no edema, tenderness or deformity  Lymphadenopathy:     She has no cervical adenopathy  Neurological: She is alert and oriented to person, place, and time  She has normal reflexes  No cranial nerve deficit  Coordination normal    Skin: Skin is warm  She is not diaphoretic  Psychiatric: She has a normal mood and affect  Additional Data:     Labs:      Results from last 7 days  Lab Units 08/13/18  0857   WBC Thousand/uL 15 46*   HEMOGLOBIN g/dL 13 0   HEMATOCRIT % 41 1   PLATELETS Thousands/uL 204   NEUTROS PCT % 78*   LYMPHS PCT % 13*   MONOS PCT % 6   EOS PCT % 2       Results from last 7 days  Lab Units 08/11/18  0435 08/10/18  0221   SODIUM mmol/L 134* 133*   POTASSIUM mmol/L 3 7 4 3   CHLORIDE mmol/L 100 98*   CO2 mmol/L 27 23   BUN mg/dL 13 26*   CREATININE mg/dL 1 19 1 91*   CALCIUM mg/dL 7 9* 9 1   TOTAL PROTEIN g/dL  --  7 1   BILIRUBIN TOTAL mg/dL  --  0 60   ALK PHOS U/L  --  96   ALT U/L  --  20   AST U/L  --  20   GLUCOSE RANDOM mg/dL 109 162*           * I Have Reviewed All Lab Data Listed Above  * Additional Pertinent Lab Tests Reviewed: All Labs Within Last 24 Hours Reviewed    Imaging:    Imaging Reports Reviewed Today Include:   Imaging Personally Reviewed by Myself Includes:      Recent Cultures (last 7 days):       Results from last 7 days  Lab Units 08/10/18  0949 08/10/18  0616 08/10/18  0611 08/10/18  0223   BLOOD CULTURE   --  No Growth at 72 hrs   No Growth at 72 hrs   --    URINE CULTURE   --   --   --  >100,000 cfu/ml    C DIFF TOXIN B  NEGATIVE for C difficle toxin by PCR    --   --   --        Last 24 Hours Medication List:     Current Facility-Administered Medications:  amLODIPine 5 mg Oral Daily DANIELA Ruano    aspirin 81 mg Oral Daily DANIELA Ruano    levothyroxine 100 mcg Oral Early Morning DANIELA Ruano    lisinopril 20 mg Oral Daily Maximo Quinones MD    ondansetron 4 mg Oral Q6H PRN DANIELA Wood    oxyCODONE-acetaminophen 1 tablet Oral Q4H PRN Maximo Quinones MD    piperacillin-tazobactam 3 375 g Intravenous Q6H Maximo Quinones MD Last Rate: Stopped (08/13/18 8888)   pravastatin 80 mg Oral Daily With [employer], CRNP         Today, Patient Was Seen By: Maximo Quinones MD    ** Please Note: Dragon 360 Dictation voice to text software may have been used in the creation of this document   **

## 2018-08-13 NOTE — PROGRESS NOTES
Progress Note - Fay Casillas 82 y o  female MRN: [id number]    Unit/Bed#: -01 Encounter: [id number]        Subjective:     Patient reports that her abdominal pain is improved since admission  She had a brown bowel movement this morning  She denies melena or hematochezia  She is tolerating a diet  She denies fevers or chills  ROS: As noted in the HPI, otherwise all others negative  Objective:     Vitals: Blood pressure 152/69, pulse 63, temperature 98 °F (36 7 °C), temperature source Oral, resp  rate 18, height 4' 10" (1 473 m), weight 86 8 kg (191 lb 5 8 oz), SpO2 96 %, not currently breastfeeding  ,Body mass index is 39 99 kg/m²  Intake/Output Summary (Last 24 hours) at 08/13/18 1402  Last data filed at 08/13/18 1304   Gross per 24 hour   Intake              940 ml   Output             2550 ml   Net            -1610 ml       Physical Exam:     General Appearance: Alert and oriented x 3   In no respiratory distress  Lungs: Clear to auscultation bilaterally, no rales or rhonchi  Heart: Regular rate and rhythm  Abdomen: Soft, mid abdominal tenderness without guarding, non-distended; bowel sounds normal; no masses or no organomegaly  Extremities: No cyanosis, edema    Invasive Devices     Peripheral Intravenous Line            Peripheral IV 08/13/18 Right Wrist less than 1 day                Lab Results:    Results from last 7 days  Lab Units 08/13/18  0857   WBC Thousand/uL 15 46*   HEMOGLOBIN g/dL 13 0   HEMATOCRIT % 41 1   PLATELETS Thousands/uL 204   NEUTROS PCT % 78*   LYMPHS PCT % 13*   MONOS PCT % 6   EOS PCT % 2       Results from last 7 days  Lab Units 08/11/18  0435 08/10/18  0221   SODIUM mmol/L 134* 133*   POTASSIUM mmol/L 3 7 4 3   CHLORIDE mmol/L 100 98*   CO2 mmol/L 27 23   BUN mg/dL 13 26*   CREATININE mg/dL 1 19 1 91*   CALCIUM mg/dL 7 9* 9 1   TOTAL PROTEIN g/dL  --  7 1   BILIRUBIN TOTAL mg/dL  --  0 60   ALK PHOS U/L  --  96   ALT U/L  --  20   AST U/L  --  20   GLUCOSE RANDOM mg/dL 109 162*           Results from last 7 days  Lab Units 08/10/18  0221   LIPASE u/L 108       Imaging Studies: I have personally reviewed pertinent imaging studies  Ct Abdomen Pelvis Wo Contrast    Result Date: 8/10/2018  Impression: Thickening of the distal transverse colon and the descending colon with surrounding inflammatory changes  Findings likely represent colitis (infectious versus inflammatory less likely ischemic)  Follow-up with gastroenterology is recommended  Workstation performed: [id number]         Assessment and Plan:     1) Abdominal pain in the setting of thickening of the distal transverse and descending colon - CT on admission showing inflammatory changes surrounding the distal transverse colon and descending colon  She had negative stool studies during admission  Likely her pain and colon wall thickening or secondary to ischemic colitis at this point  She initially presented with hematochezia that has since resolved  Overall, she continues to improve  She did have a leukocytosis of 19k that has since trended downward to 15k while on Zosyn  Vitals are stable  - Follow-up O&P  - After Dr John Dickens and patient discussed colonoscopy, we will plan for this tomorrow     - Clear liquid and GoLYTELY ordered  -continue to monitor abdominal exam, for fever and leukocytosis

## 2018-08-13 NOTE — ASSESSMENT & PLAN NOTE
Pre-hospital associated with nausea, vomiting, diarrhea  CT of the abdomen reveals thickening of distal transverse colon and descending colon with surrounding inflammatory changes possible colitis  Infectious versus inflammatory  She was initially started on Cipro and Flagyl  Discontinue Zosyn#3 and follow closely    Significant improvement of white blood cell count  If white blood cell count improved further, consider discharging patient tomorrow on p o   Augmentin

## 2018-08-14 ENCOUNTER — ANESTHESIA (INPATIENT)
Dept: PERIOP | Facility: HOSPITAL | Age: 83
DRG: 394 | End: 2018-08-14
Payer: MEDICARE

## 2018-08-14 ENCOUNTER — ANESTHESIA EVENT (INPATIENT)
Dept: PERIOP | Facility: HOSPITAL | Age: 83
DRG: 394 | End: 2018-08-14
Payer: MEDICARE

## 2018-08-14 PROBLEM — K63.9 COLON WALL THICKENING: Status: ACTIVE | Noted: 2018-08-10

## 2018-08-14 LAB
BASOPHILS # BLD AUTO: 0.05 THOUSANDS/ΜL (ref 0–0.1)
BASOPHILS NFR BLD AUTO: 0 % (ref 0–1)
EOSINOPHIL # BLD AUTO: 0.26 THOUSAND/ΜL (ref 0–0.61)
EOSINOPHIL NFR BLD AUTO: 2 % (ref 0–6)
ERYTHROCYTE [DISTWIDTH] IN BLOOD BY AUTOMATED COUNT: 16.8 % (ref 11.6–15.1)
HCT VFR BLD AUTO: 38.6 % (ref 34.8–46.1)
HGB BLD-MCNC: 12.4 G/DL (ref 11.5–15.4)
IMM GRANULOCYTES # BLD AUTO: 0.1 THOUSAND/UL (ref 0–0.2)
IMM GRANULOCYTES NFR BLD AUTO: 1 % (ref 0–2)
LYMPHOCYTES # BLD AUTO: 2.17 THOUSANDS/ΜL (ref 0.6–4.47)
LYMPHOCYTES NFR BLD AUTO: 14 % (ref 14–44)
MCH RBC QN AUTO: 26.7 PG (ref 26.8–34.3)
MCHC RBC AUTO-ENTMCNC: 32.1 G/DL (ref 31.4–37.4)
MCV RBC AUTO: 83 FL (ref 82–98)
MONOCYTES # BLD AUTO: 1.24 THOUSAND/ΜL (ref 0.17–1.22)
MONOCYTES NFR BLD AUTO: 8 % (ref 4–12)
NEUTROPHILS # BLD AUTO: 11.26 THOUSANDS/ΜL (ref 1.85–7.62)
NEUTS SEG NFR BLD AUTO: 75 % (ref 43–75)
NRBC BLD AUTO-RTO: 0 /100 WBCS
O+P STL CONC: NORMAL
PLATELET # BLD AUTO: 199 THOUSANDS/UL (ref 149–390)
PMV BLD AUTO: 11.5 FL (ref 8.9–12.7)
RBC # BLD AUTO: 4.64 MILLION/UL (ref 3.81–5.12)
WBC # BLD AUTO: 15.08 THOUSAND/UL (ref 4.31–10.16)

## 2018-08-14 PROCEDURE — 88305 TISSUE EXAM BY PATHOLOGIST: CPT | Performed by: PATHOLOGY

## 2018-08-14 PROCEDURE — 99232 SBSQ HOSP IP/OBS MODERATE 35: CPT | Performed by: INTERNAL MEDICINE

## 2018-08-14 PROCEDURE — 45380 COLONOSCOPY AND BIOPSY: CPT | Performed by: INTERNAL MEDICINE

## 2018-08-14 PROCEDURE — 0DBM8ZX EXCISION OF DESCENDING COLON, VIA NATURAL OR ARTIFICIAL OPENING ENDOSCOPIC, DIAGNOSTIC: ICD-10-PCS | Performed by: INTERNAL MEDICINE

## 2018-08-14 PROCEDURE — 0DBL8ZX EXCISION OF TRANSVERSE COLON, VIA NATURAL OR ARTIFICIAL OPENING ENDOSCOPIC, DIAGNOSTIC: ICD-10-PCS | Performed by: INTERNAL MEDICINE

## 2018-08-14 RX ORDER — SODIUM CHLORIDE, SODIUM LACTATE, POTASSIUM CHLORIDE, CALCIUM CHLORIDE 600; 310; 30; 20 MG/100ML; MG/100ML; MG/100ML; MG/100ML
INJECTION, SOLUTION INTRAVENOUS CONTINUOUS PRN
Status: DISCONTINUED | OUTPATIENT
Start: 2018-08-14 | End: 2018-08-14 | Stop reason: SURG

## 2018-08-14 RX ORDER — PROPOFOL 10 MG/ML
INJECTION, EMULSION INTRAVENOUS AS NEEDED
Status: DISCONTINUED | OUTPATIENT
Start: 2018-08-14 | End: 2018-08-14 | Stop reason: SURG

## 2018-08-14 RX ORDER — LIDOCAINE HYDROCHLORIDE 10 MG/ML
INJECTION, SOLUTION INFILTRATION; PERINEURAL AS NEEDED
Status: DISCONTINUED | OUTPATIENT
Start: 2018-08-14 | End: 2018-08-14 | Stop reason: SURG

## 2018-08-14 RX ADMIN — PROPOFOL 80 MG: 10 INJECTION, EMULSION INTRAVENOUS at 12:24

## 2018-08-14 RX ADMIN — PANTOPRAZOLE SODIUM 40 MG: 40 TABLET, DELAYED RELEASE ORAL at 05:52

## 2018-08-14 RX ADMIN — SODIUM CHLORIDE, SODIUM LACTATE, POTASSIUM CHLORIDE, AND CALCIUM CHLORIDE: .6; .31; .03; .02 INJECTION, SOLUTION INTRAVENOUS at 11:57

## 2018-08-14 RX ADMIN — PIPERACILLIN SODIUM,TAZOBACTAM SODIUM 3.38 G: 3; .375 INJECTION, POWDER, FOR SOLUTION INTRAVENOUS at 00:05

## 2018-08-14 RX ADMIN — LEVOTHYROXINE SODIUM 100 MCG: 100 TABLET ORAL at 05:52

## 2018-08-14 RX ADMIN — PROPOFOL 20 MG: 10 INJECTION, EMULSION INTRAVENOUS at 12:27

## 2018-08-14 RX ADMIN — PROPOFOL 20 MG: 10 INJECTION, EMULSION INTRAVENOUS at 12:26

## 2018-08-14 RX ADMIN — LISINOPRIL 20 MG: 20 TABLET ORAL at 14:36

## 2018-08-14 RX ADMIN — PIPERACILLIN SODIUM,TAZOBACTAM SODIUM 3.38 G: 3; .375 INJECTION, POWDER, FOR SOLUTION INTRAVENOUS at 05:52

## 2018-08-14 RX ADMIN — AMLODIPINE BESYLATE 5 MG: 5 TABLET ORAL at 09:22

## 2018-08-14 RX ADMIN — PROPOFOL 30 MG: 10 INJECTION, EMULSION INTRAVENOUS at 12:30

## 2018-08-14 RX ADMIN — LIDOCAINE HYDROCHLORIDE 50 MG: 10 INJECTION, SOLUTION INFILTRATION; PERINEURAL at 12:24

## 2018-08-14 NOTE — PROGRESS NOTES
[facility] Internal Medicine Progress Note  Patient: Gloria All 82 y o  female   MRN: [id number]  PCP: Brian Dickson MD  Unit/Bed#: OR POOL Encounter: [id number]  Date Of Visit: 18    Assessment:    Principal Problem:    Generalized abdominal pain  Active Problems:    Vomiting    Hypertension    Leukocytosis    KRISTYN (acute kidney injury) ([address] )    Rash    Hematuria    Colon wall thickening      Plan:    · 1  Abdominal pain secondary to colitis- mostlikely ischemic as per GI  No abx needed  Patient for colonoscopy today  · 2  HTN- on lisinopril and norvasc  · 3  Leukocytosis- secondary to ischemic colitis  Stable  · 4  Nausea and vomiting- resolved  VTE Pharmacologic Prophylaxis:   Pharmacologic: SCD  Mechanical VTE Prophylaxis in Place: Yes    Patient Centered Rounds: I have performed bedside rounds with nursing staff today  Discussions with Specialists or Other Care Team Provider:     Education and Discussions with Family / Patient:     Time Spent for Care: 30 minutes  More than 50% of total time spent on counseling and coordination of care as described above  Current Length of Stay: 4 day(s)    Current Patient Status: Inpatient   Certification Statement: The patient will continue to require additional inpatient hospital stay due to colitis    Discharge Plan / Estimated Discharge Date:     Code Status: Level 1 - Full Code      Subjective:   Patient seen and examined at bedside  Patient has no new complaints  Objective:     Vitals:   Temp (24hrs), Av °F (36 7 °C), Min:97 4 °F (36 3 °C), Max:98 5 °F (36 9 °C)    HR:  [64-75] 64  Resp:  [17-22] 21  BP: (109-159)/(61-75) 115/68  SpO2:  [95 %-97 %] 95 %  Body mass index is 39 99 kg/m²  Input and Output Summary (last 24 hours):        Intake/Output Summary (Last 24 hours) at 18 1313  Last data filed at 18 1235   Gross per 24 hour   Intake              390 ml   Output              550 ml   Net             -160 ml       Physical Exam:     Physical Exam   Constitutional: She is oriented to person, place, and time  She appears well-developed and well-nourished  HENT:   Head: Normocephalic and atraumatic  Eyes: Conjunctivae and EOM are normal  Pupils are equal, round, and reactive to light  Neck: Normal range of motion  Neck supple  No JVD present  No tracheal deviation present  No thyromegaly present  Cardiovascular: Normal rate, regular rhythm and normal heart sounds  Exam reveals no gallop and no friction rub  No murmur heard  Pulmonary/Chest: Effort normal and breath sounds normal  No respiratory distress  She has no wheezes  She has no rales  Abdominal: Soft  Bowel sounds are normal  She exhibits no distension  There is tenderness  There is no rebound  Musculoskeletal: Normal range of motion  She exhibits no edema, tenderness or deformity  Neurological: She is oriented to person, place, and time  Vitals reviewed  Additional Data:     Labs:      Results from last 7 days  Lab Units 08/13/18  2358   WBC Thousand/uL 15 08*   HEMOGLOBIN g/dL 12 4   HEMATOCRIT % 38 6   PLATELETS Thousands/uL 199   NEUTROS PCT % 75   LYMPHS PCT % 14   MONOS PCT % 8   EOS PCT % 2       Results from last 7 days  Lab Units 08/11/18  0435 08/10/18  0221   SODIUM mmol/L 134* 133*   POTASSIUM mmol/L 3 7 4 3   CHLORIDE mmol/L 100 98*   CO2 mmol/L 27 23   BUN mg/dL 13 26*   CREATININE mg/dL 1 19 1 91*   CALCIUM mg/dL 7 9* 9 1   TOTAL PROTEIN g/dL  --  7 1   BILIRUBIN TOTAL mg/dL  --  0 60   ALK PHOS U/L  --  96   ALT U/L  --  20   AST U/L  --  20   GLUCOSE RANDOM mg/dL 109 162*           * I Have Reviewed All Lab Data Listed Above  * Additional Pertinent Lab Tests Reviewed:  [facility]    Imaging:    Imaging Reports Reviewed Today Include:   Imaging Personally Reviewed by Myself Includes:      Recent Cultures (last 7 days):       Results from last 7 days  Lab Units 08/10/18  0949 08/10/18  0616 08/10/18  7341 08/10/18  0223   BLOOD CULTURE   --  No Growth After 4 Days  No Growth After 4 Days  --    URINE CULTURE   --   --   --  >100,000 cfu/ml    C DIFF TOXIN B  NEGATIVE for C difficle toxin by PCR    --   --   --        Last 24 Hours Medication List:     Current Facility-Administered Medications:  [MAR Hold] amLODIPine 5 mg Oral Daily DANIELA Ruano   [MAR Hold] aspirin 81 mg Oral Daily DANIELA Ruano   [MAR Hold] levothyroxine 100 mcg Oral Early Morning DANIELA Ruano   [MAR Hold] lisinopril 20 mg Oral Daily Jewelene Gosselin, MD   [MAR Hold] ondansetron 4 mg Oral Q6H PRN DANIELA Morris   [facility] Hold] oxyCODONE-acetaminophen 1 tablet Oral Q4H PRN Jewelene Gosselin, MD   [MAR Hold] pantoprazole 40 mg Oral Early Morning Tiffanie Nance PA-C   [MAR Hold] pravastatin 80 mg Oral Daily With [employer], CRNP        Today, Patient Was Seen By: Ginny Whitley MD    ** Please Note: This note has been constructed using a voice recognition system   **

## 2018-08-14 NOTE — OP NOTE
**** GI/ENDOSCOPY REPORT ****     PATIENT NAME: Beto Monge ------ VISIT ID:  Patient ID:   [id number] YOB: 1935     INTRODUCTION: Colonoscopy - A 82 female patient presents for an inpatient   Colonoscopy at [address] [place] [address]  PREVIOUS COLONOSCOPY:     INDICATIONS: Surveillance  Bleeding  CONSENT:  The benefits, risks, and alternatives to the procedure were   discussed and informed consent was obtained from the patient  PREPARATION: EKG, pulse, pulse oximetry and blood pressure were monitored   throughout the procedure  The patient was identified by myself both   verbally and by visual inspection of ID band  Airway Assessment   Classification: Airway class 2 - Visualization of the soft palate, fauces   and uvula  ASA Classification: Class 2 - Patient has mild to moderate   systemic disturbance that may or may not be related to the disorder   requiring surgery  MEDICATIONS: Anesthesia-check records MAC anesthesia  PROCEDURE:  The endoscope was passed without difficulty through the anus   under direct visualization and advanced to the cecum, confirmed by   appendiceal orifice and ileocecal valve  The scope was withdrawn and the   mucosa was carefully examined  The quality of the preparation was fair  Cecal Intubation Time: 3 minutes(s) Scope Withdrawal Time: 8 Minute(s)     RECTAL EXAM: Normal rectal exam      FINDINGS:  There was evidence of moderately severe colitis likely ischemic   colitis in the transverse colon, splenic flexure, and descending colon  A   cold forceps biopsy was taken  Large external hemorrhoids were found  COMPLICATIONS: There were no complications  IMPRESSIONS: Moderately severe colitis found in the transverse colon,   splenic flexure, and descending colon  Biopsy taken  External hemorrhoids  RECOMMENDATIONS: Follow-up on the results of the biopsy specimens  ESTIMATED BLOOD LOSS: Insignificant       PATHOLOGY SPECIMENS: Cold forceps biopsy biopsy taken  Associated finding:   Colitis  PROCEDURE CODES:     ICD-9 Codes: 578 9 Hemorrhage of gastrointestinal tract, unspecified 558 9   Other and unspecified noninfectious gastroenteritides and colitis     ICD-10 Codes: K92 2 Gastrointestinal hemorrhage, unspecified K52 9   Noninfective gastroenteritis and colitis, unspecified K64 9 Unspecified   hemorrhoids     PERFORMED BY: ONEL Curiel  on 08/14/2018  Version 1, electronically signed by ONEL Dutta  on 08/14/2018 at   12:52

## 2018-08-14 NOTE — ANESTHESIA PREPROCEDURE EVALUATION
Review of Systems/Medical History  Patient summary reviewed  Chart reviewed  No history of anesthetic complications     Cardiovascular  Exercise tolerance (METS): >4,  Hyperlipidemia, Hypertension on > 1 medication,    Pulmonary  COPD mild- PRN medicaiton ,        GI/Hepatic    GERD poorly controlled,             Endo/Other  History of thyroid disease , hypothyroidism,      GYN       Hematology   Musculoskeletal    Arthritis     Neurology   Psychology           Physical Exam    Airway    Mallampati score: II  TM Distance: >3 FB  Neck ROM: full     Dental   upper dentures and lower dentures,     Cardiovascular      Pulmonary      Other Findings        Anesthesia Plan  ASA Score- 3     Anesthesia Type- IV sedation with anesthesia with ASA Monitors  Additional Monitors:   Airway Plan:         Plan Factors-    Induction- intravenous  Postoperative Plan-     Informed Consent- Anesthetic plan and risks discussed with patient  I personally reviewed this patient with the CRNA  Discussed and agreed on the Anesthesia Plan with the CRNA  Ximena Sanders

## 2018-08-14 NOTE — PROGRESS NOTES
Pt refusing to drink bowel prep  Drank about 50% at this time  Has not had a bowel movement in 4 hours  Last BM not clear  On call GI made aware and advised  Nursing to call GI in the morning  GI will be called in the morning to make them aware   Mark Abraham RN

## 2018-08-14 NOTE — ANESTHESIA POSTPROCEDURE EVALUATION
Post-Op Assessment Note      CV Status:  Stable    Mental Status:  Alert and awake    Hydration Status:  Stable    PONV Controlled:  None    Airway Patency:  Patent and adequate    Post Op Vitals Reviewed: Yes          Staff: CRNA           BP   109/61   Temp   98 5   Pulse  67   Resp   18   SpO2   94

## 2018-08-14 NOTE — CASE MANAGEMENT
Continued Stay Review    Date: 8/14    Vital Signs: BP (!) 175/85 (BP Location: Right arm)   Pulse 82   Temp 97 8 °F (36 6 °C) (Oral)   Resp 18   Ht 4' 10" (1 473 m)   Wt 86 8 kg (191 lb 5 8 oz)   SpO2 95%   Breastfeeding? No   BMI 39 99 kg/m²     Medications:   Scheduled Meds:   Current Facility-Administered Medications:  [MAR Hold] amLODIPine 5 mg Oral Daily DANIELA Ruano   [MAR Hold] aspirin 81 mg Oral Daily DANIELA Ruano   [MAR Hold] levothyroxine 100 mcg Oral Early Morning DANIELA Ruano   [MAR Hold] lisinopril 20 mg Oral Daily Doroteo Jimenez MD   [MAR Hold] ondansetron 4 mg Oral Q6H PRN DANIELA Arciniega   [facility] Hold] oxyCODONE-acetaminophen 1 tablet Oral Q4H PRN Doroteo Jimenez MD   [MAR Hold] pantoprazole 40 mg Oral Early Morning Gertrudis Acuna PA-C   [MAR Hold] pravastatin 80 mg Oral Daily With [employer], CRNP     Continuous Infusions:    PRN Meds: [MAR Hold] ondansetron    [MAR Hold] oxyCODONE-acetaminophen    Abnormal Labs/Diagnostic Results: none    Age/Sex: 82 y o  female     Assessment/Plan:    Assessment:     Principal Problem:    Generalized abdominal pain  Active Problems:    Vomiting    Hypertension    Leukocytosis    KRISTYN (acute kidney injury) ([facility] [address] )    Rash    Hematuria    Colon wall thickening   Plan:   1   Abdominal pain secondary to colitis- mostlikely ischemic as per GI  No abx needed  Patient for colonoscopy today  · 2  HTN- on lisinopril and norvasc  · 3  Leukocytosis- secondary to ischemic colitis  Stable  · 4  Nausea and vomiting- resolved    VTE Pharmacologic Prophylaxis:   Pharmacologic: SCD  Mechanical VTE Prophylaxis in Place: Yes   Patient Centered Rounds: I have performed bedside rounds with nursing staff today    Discussions with Specialists or Other Care Team Provider:    Education and Discussions with Family / Patient:    Time Spent for Care: 30 minutes    More than 50% of total time spent on counseling and coordination of care as described above    Current Length of Stay: 4 day(s)   Current Patient Status: Inpatient   Certification Statement: The patient will continue to require additional inpatient hospital stay due to colitis   Discharge Plan / Estimated Discharge Date:   Discharge Plan: TBD       OP note coloscopy  8/14  FINDINGS:  There was evidence of moderately severe colitis likely ischemic   colitis in the transverse colon, splenic flexure, and descending colon  A   cold forceps biopsy was taken  Large external hemorrhoids were found

## 2018-08-15 VITALS
RESPIRATION RATE: 19 BRPM | SYSTOLIC BLOOD PRESSURE: 155 MMHG | WEIGHT: 191.36 LBS | DIASTOLIC BLOOD PRESSURE: 74 MMHG | TEMPERATURE: 98.5 F | HEIGHT: 58 IN | OXYGEN SATURATION: 96 % | BODY MASS INDEX: 40.17 KG/M2 | HEART RATE: 84 BPM

## 2018-08-15 LAB
ANION GAP SERPL CALCULATED.3IONS-SCNC: 8 MMOL/L (ref 4–13)
BACTERIA BLD CULT: NORMAL
BACTERIA BLD CULT: NORMAL
BASOPHILS # BLD AUTO: 0.04 THOUSANDS/ΜL (ref 0–0.1)
BASOPHILS NFR BLD AUTO: 0 % (ref 0–1)
BUN SERPL-MCNC: 8 MG/DL (ref 5–25)
CALCIUM SERPL-MCNC: 8.9 MG/DL (ref 8.3–10.1)
CHLORIDE SERPL-SCNC: 101 MMOL/L (ref 100–108)
CO2 SERPL-SCNC: 28 MMOL/L (ref 21–32)
CREAT SERPL-MCNC: 0.97 MG/DL (ref 0.6–1.3)
EOSINOPHIL # BLD AUTO: 0.16 THOUSAND/ΜL (ref 0–0.61)
EOSINOPHIL NFR BLD AUTO: 1 % (ref 0–6)
ERYTHROCYTE [DISTWIDTH] IN BLOOD BY AUTOMATED COUNT: 16.4 % (ref 11.6–15.1)
GFR SERPL CREATININE-BSD FRML MDRD: 55 ML/MIN/1.73SQ M
GLUCOSE SERPL-MCNC: 102 MG/DL (ref 65–140)
HCT VFR BLD AUTO: 38.4 % (ref 34.8–46.1)
HGB BLD-MCNC: 12.7 G/DL (ref 11.5–15.4)
IMM GRANULOCYTES # BLD AUTO: 0.21 THOUSAND/UL (ref 0–0.2)
IMM GRANULOCYTES NFR BLD AUTO: 2 % (ref 0–2)
LYMPHOCYTES # BLD AUTO: 1.72 THOUSANDS/ΜL (ref 0.6–4.47)
LYMPHOCYTES NFR BLD AUTO: 15 % (ref 14–44)
MCH RBC QN AUTO: 26.8 PG (ref 26.8–34.3)
MCHC RBC AUTO-ENTMCNC: 33.1 G/DL (ref 31.4–37.4)
MCV RBC AUTO: 81 FL (ref 82–98)
MONOCYTES # BLD AUTO: 1.19 THOUSAND/ΜL (ref 0.17–1.22)
MONOCYTES NFR BLD AUTO: 10 % (ref 4–12)
NEUTROPHILS # BLD AUTO: 8.41 THOUSANDS/ΜL (ref 1.85–7.62)
NEUTS SEG NFR BLD AUTO: 72 % (ref 43–75)
NRBC BLD AUTO-RTO: 0 /100 WBCS
PLATELET # BLD AUTO: 219 THOUSANDS/UL (ref 149–390)
PMV BLD AUTO: 11.1 FL (ref 8.9–12.7)
POTASSIUM SERPL-SCNC: 3.5 MMOL/L (ref 3.5–5.3)
RBC # BLD AUTO: 4.73 MILLION/UL (ref 3.81–5.12)
SODIUM SERPL-SCNC: 137 MMOL/L (ref 136–145)
WBC # BLD AUTO: 11.73 THOUSAND/UL (ref 4.31–10.16)

## 2018-08-15 PROCEDURE — 80048 BASIC METABOLIC PNL TOTAL CA: CPT | Performed by: INTERNAL MEDICINE

## 2018-08-15 PROCEDURE — 85025 COMPLETE CBC W/AUTO DIFF WBC: CPT | Performed by: INTERNAL MEDICINE

## 2018-08-15 PROCEDURE — 99239 HOSP IP/OBS DSCHRG MGMT >30: CPT | Performed by: INTERNAL MEDICINE

## 2018-08-15 RX ADMIN — PRAVASTATIN SODIUM 80 MG: 80 TABLET ORAL at 16:54

## 2018-08-15 RX ADMIN — AMLODIPINE BESYLATE 5 MG: 5 TABLET ORAL at 08:28

## 2018-08-15 RX ADMIN — LEVOTHYROXINE SODIUM 100 MCG: 100 TABLET ORAL at 05:11

## 2018-08-15 RX ADMIN — ASPIRIN 81 MG CHEWABLE TABLET 81 MG: 81 TABLET CHEWABLE at 08:28

## 2018-08-15 RX ADMIN — LISINOPRIL 20 MG: 20 TABLET ORAL at 08:28

## 2018-08-15 RX ADMIN — PANTOPRAZOLE SODIUM 40 MG: 40 TABLET, DELAYED RELEASE ORAL at 05:11

## 2018-08-15 NOTE — SOCIAL WORK
Met with pt at bedside  Pt alert  Pt reported that she lives at home with family  She said that she is ADL independent  No DME needed  She said that she sometimes uses a cane  She said that she has 5 steps to enter  She reported that she does well with stairs  She said that she has no hx of VNA or SNF and feels neither is neccessary at this time  She said that she takes her medications independently  She uses [facility] in [facility]  She said that her son is POA  She said that she uses [employer] for transportation  CM reviewed discharge planning process including the following: identifying help at home, patient preference for discharge planning needs, pharmacy preference, and availability of treatment team to discuss questions or concerns patient and/or family may have regarding understanding medications and recognizing signs and symptoms once discharged  CM also encouraged patient to follow up with all recommended appointments after discharge  Patient advised of importance for patient and family to participate in managing patients medical well being  CM name and role reviewed  Discharge Checklist reviewed and CM will continue to monitor for progress toward discharge goals in nursing and provider rounds

## 2018-08-15 NOTE — SOCIAL WORK
IMM reviewed and discussed with pt  No appeal action at this time  Pt stated that she has to wait for family to pick her up at 7pm  Pt stated that she is ready to go home

## 2018-08-15 NOTE — PLAN OF CARE

## 2018-08-15 NOTE — DISCHARGE SUMMARY
Discharge Summary - [facility] Medicine    Patient Information: Kaylin Castellanos 82 y o  female MRN: [id number]  Unit/Bed#: -01 Encounter: [id number]    Discharging Physician / Practitioner: Ethan Alfredo MD  PCP: Ximena Kasper MD  Admission Date: 8/10/2018  Discharge Date: 08/15/18    Disposition:     Home    Reason for Admission: Abdominal pain    Discharge Diagnoses:     Principal Problem:    Generalized abdominal pain  Active Problems:    Vomiting    Hypertension    Leukocytosis    KRISTYN (acute kidney injury) ([address] )    Rash    Hematuria    Colon wall thickening  Resolved Problems:    * No resolved hospital problems  *      Consultations During Hospital Stay:  · GI    Procedures Performed:     · Colonsocopy    Significant Findings / Test Results:     · CT abdomen and pelvis without constrast  Impression:       Thickening of the distal transverse colon and the descending colon with surrounding inflammatory changes   Findings likely represent colitis (infectious versus inflammatory less likely ischemic)   Follow-up with gastroenterology is recommended  ·     Incidental Findings:   ·      Test Results Pending at Discharge (will require follow up):   ·      Outpatient Tests Requested:  ·     Complications:  None    History of Present Illness:     Kaylin Castellanos is a 82 y o  female who presents with chief complaint of abdominal pain associated with nausea vomiting diarrhea  Hospital Course:     Kaylin Castellanos is a 82 y o  female patient who originally presented to the hospital on 8/10/2018 due to complaints of abdominal pain  She was found to have colitis and placed on IV abx  GI evaluated the patient and believe that her colitis was ischemic and not infectious  Abx were discontinued  Patient also taken for a colonoscopy which showed colitis, mostlikely ischemic and biopsies were taken  Patient is currently hemodynamically stable and will be discharged home    Patient to followup with GI as outpatient  Condition at Discharge: good     Discharge Day Visit / Exam:     Subjective:  Patient seen and examined at bedside  Patient has no new complaints  Vitals: Blood Pressure: 157/77 (08/15/18 0700)  Pulse: 69 (08/15/18 0700)  Temperature: 98 2 °F (36 8 °C) (08/15/18 0700)  Temp Source: Oral (08/15/18 0700)  Respirations: 18 (08/15/18 0700)  Height: 4' 10" (147 3 cm) (08/10/18 0530)  Weight - Scale: 86 8 kg (191 lb 5 8 oz) (08/10/18 0530)  SpO2: 96 % (08/15/18 0700)  Exam:   Physical Exam   Constitutional: She is oriented to person, place, and time  She appears well-developed and well-nourished  HENT:   Head: Normocephalic and atraumatic  Eyes: Conjunctivae and EOM are normal  Pupils are equal, round, and reactive to light  Neck: Normal range of motion  Neck supple  No JVD present  No tracheal deviation present  No thyromegaly present  Cardiovascular: Normal rate, regular rhythm and normal heart sounds  Exam reveals no gallop and no friction rub  No murmur heard  Pulmonary/Chest: Effort normal and breath sounds normal  No respiratory distress  She has no wheezes  She has no rales  Abdominal: Soft  Bowel sounds are normal  She exhibits no distension  There is tenderness  There is no rebound  Musculoskeletal: Normal range of motion  She exhibits no edema  Neurological: She is alert and oriented to person, place, and time  Vitals reviewed  Discussion with Family:     Discharge instructions/Information to patient and family:   See after visit summary for information provided to patient and family  Provisions for Follow-Up Care:  See after visit summary for information related to follow-up care and any pertinent home health orders  Planned Readmission: none     Discharge Statement:  I spent 50 minutes discharging the patient  This time was spent on the day of discharge  I had direct contact with the patient on the day of discharge   Greater than 50% of the total time was spent examining patient, answering all patient questions, arranging and discussing plan of care with patient as well as directly providing post-discharge instructions  Additional time then spent on discharge activities  Discharge Medications:  See after visit summary for reconciled discharge medications provided to patient and family        ** Please Note: This note has been constructed using a voice recognition system **

## 2019-09-14 ENCOUNTER — APPOINTMENT (EMERGENCY)
Dept: RADIOLOGY | Facility: HOSPITAL | Age: 84
End: 2019-09-14
Payer: MEDICARE

## 2019-09-14 ENCOUNTER — APPOINTMENT (EMERGENCY)
Dept: CT IMAGING | Facility: HOSPITAL | Age: 84
End: 2019-09-14
Payer: MEDICARE

## 2019-09-14 ENCOUNTER — HOSPITAL ENCOUNTER (EMERGENCY)
Facility: HOSPITAL | Age: 84
End: 2019-09-15
Attending: EMERGENCY MEDICINE | Admitting: EMERGENCY MEDICINE
Payer: MEDICARE

## 2019-09-14 DIAGNOSIS — S12.200A C3 CERVICAL FRACTURE (HCC): ICD-10-CM
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
## 2019-09-14 LAB
ANION GAP SERPL CALCULATED.3IONS-SCNC: 6 MMOL/L (ref 4–13)
BASOPHILS # BLD AUTO: 0.06 THOUSANDS/ΜL (ref 0–0.1)
BASOPHILS NFR BLD AUTO: 1 % (ref 0–1)
BUN SERPL-MCNC: 19 MG/DL (ref 5–25)
CALCIUM SERPL-MCNC: 9.1 MG/DL (ref 8.3–10.1)
CHLORIDE SERPL-SCNC: 103 MMOL/L (ref 100–108)
CO2 SERPL-SCNC: 30 MMOL/L (ref 21–32)
CREAT SERPL-MCNC: 1.38 MG/DL (ref 0.6–1.3)
EOSINOPHIL # BLD AUTO: 0.64 THOUSAND/ΜL (ref 0–0.61)
EOSINOPHIL NFR BLD AUTO: 6 % (ref 0–6)
ERYTHROCYTE [DISTWIDTH] IN BLOOD BY AUTOMATED COUNT: 15.4 % (ref 11.6–15.1)
GFR SERPL CREATININE-BSD FRML MDRD: 36 ML/MIN/1.73SQ M
GLUCOSE SERPL-MCNC: 85 MG/DL (ref 65–140)
HCT VFR BLD AUTO: 43.8 % (ref 34.8–46.1)
HGB BLD-MCNC: 13.3 G/DL (ref 11.5–15.4)
IMM GRANULOCYTES # BLD AUTO: 0.11 THOUSAND/UL (ref 0–0.2)
IMM GRANULOCYTES NFR BLD AUTO: 1 % (ref 0–2)
LYMPHOCYTES # BLD AUTO: 2.31 THOUSANDS/ΜL (ref 0.6–4.47)
LYMPHOCYTES NFR BLD AUTO: 22 % (ref 14–44)
MCH RBC QN AUTO: 26.5 PG (ref 26.8–34.3)
MCHC RBC AUTO-ENTMCNC: 30.4 G/DL (ref 31.4–37.4)
MCV RBC AUTO: 87 FL (ref 82–98)
MONOCYTES # BLD AUTO: 1.14 THOUSAND/ΜL (ref 0.17–1.22)
MONOCYTES NFR BLD AUTO: 11 % (ref 4–12)
NEUTROPHILS # BLD AUTO: 6.37 THOUSANDS/ΜL (ref 1.85–7.62)
NEUTS SEG NFR BLD AUTO: 59 % (ref 43–75)
NRBC BLD AUTO-RTO: 0 /100 WBCS
PLATELET # BLD AUTO: 271 THOUSANDS/UL (ref 149–390)
PMV BLD AUTO: 11.5 FL (ref 8.9–12.7)
POTASSIUM SERPL-SCNC: 3.9 MMOL/L (ref 3.5–5.3)
RBC # BLD AUTO: 5.01 MILLION/UL (ref 3.81–5.12)
SODIUM SERPL-SCNC: 139 MMOL/L (ref 136–145)
WBC # BLD AUTO: 10.63 THOUSAND/UL (ref 4.31–10.16)

## 2019-09-14 PROCEDURE — 73552 X-RAY EXAM OF FEMUR 2/>: CPT

## 2019-09-14 PROCEDURE — 96361 HYDRATE IV INFUSION ADD-ON: CPT

## 2019-09-14 PROCEDURE — 96374 THER/PROPH/DIAG INJ IV PUSH: CPT

## 2019-09-14 PROCEDURE — 36415 COLL VENOUS BLD VENIPUNCTURE: CPT | Performed by: EMERGENCY MEDICINE

## 2019-09-14 PROCEDURE — 71260 CT THORAX DX C+: CPT

## 2019-09-14 PROCEDURE — 99284 EMERGENCY DEPT VISIT MOD MDM: CPT | Performed by: EMERGENCY MEDICINE

## 2019-09-14 PROCEDURE — 73030 X-RAY EXAM OF SHOULDER: CPT

## 2019-09-14 PROCEDURE — 80048 BASIC METABOLIC PNL TOTAL CA: CPT | Performed by: EMERGENCY MEDICINE

## 2019-09-14 PROCEDURE — 93005 ELECTROCARDIOGRAM TRACING: CPT

## 2019-09-14 PROCEDURE — 72125 CT NECK SPINE W/O DYE: CPT

## 2019-09-14 PROCEDURE — 99285 EMERGENCY DEPT VISIT HI MDM: CPT

## 2019-09-14 PROCEDURE — 70486 CT MAXILLOFACIAL W/O DYE: CPT

## 2019-09-14 PROCEDURE — 74177 CT ABD & PELVIS W/CONTRAST: CPT

## 2019-09-14 PROCEDURE — 85025 COMPLETE CBC W/AUTO DIFF WBC: CPT | Performed by: EMERGENCY MEDICINE

## 2019-09-14 PROCEDURE — 70450 CT HEAD/BRAIN W/O DYE: CPT

## 2019-09-14 RX ORDER — FENTANYL CITRATE 50 UG/ML
50 INJECTION, SOLUTION INTRAMUSCULAR; INTRAVENOUS ONCE
Status: COMPLETED | OUTPATIENT
Start: 2019-09-14 | End: 2019-09-14

## 2019-09-14 RX ORDER — CITALOPRAM 20 MG/1
TABLET ORAL
COMMUNITY
Start: 2013-10-10

## 2019-09-14 RX ORDER — GABAPENTIN 100 MG/1
100 CAPSULE ORAL 4 TIMES DAILY
COMMUNITY
Start: 2016-11-04

## 2019-09-14 RX ADMIN — SODIUM CHLORIDE 1000 ML: 0.9 INJECTION, SOLUTION INTRAVENOUS at 22:11

## 2019-09-14 RX ADMIN — IOHEXOL 100 ML: 350 INJECTION, SOLUTION INTRAVENOUS at 22:05

## 2019-09-14 RX ADMIN — FENTANYL CITRATE 50 MCG: 50 INJECTION, SOLUTION INTRAMUSCULAR; INTRAVENOUS at 21:15

## 2019-09-15 ENCOUNTER — APPOINTMENT (OUTPATIENT)
Dept: RADIOLOGY | Facility: HOSPITAL | Age: 84
End: 2019-09-15
Payer: MEDICARE

## 2019-09-15 ENCOUNTER — TELEPHONE (OUTPATIENT)
Dept: OTHER | Facility: OTHER | Age: 84
End: 2019-09-15

## 2019-09-15 ENCOUNTER — HOSPITAL ENCOUNTER (OUTPATIENT)
Facility: HOSPITAL | Age: 84
Setting detail: OBSERVATION
Discharge: HOME | End: 2019-09-17
Attending: SURGERY | Admitting: SURGERY
Payer: MEDICARE

## 2019-09-15 VITALS
HEART RATE: 70 BPM | WEIGHT: 189 LBS | TEMPERATURE: 97.9 F | DIASTOLIC BLOOD PRESSURE: 85 MMHG | BODY MASS INDEX: 39.67 KG/M2 | RESPIRATION RATE: 16 BRPM | OXYGEN SATURATION: 95 % | SYSTOLIC BLOOD PRESSURE: 134 MMHG | HEIGHT: 58 IN

## 2019-09-15 DIAGNOSIS — S22.000A CLOSED COMPRESSION FRACTURE OF THORACIC VERTEBRA, INITIAL ENCOUNTER (HCC): ICD-10-CM

## 2019-09-15 DIAGNOSIS — S12.200A C3 CERVICAL FRACTURE (HCC): Primary | ICD-10-CM

## 2019-09-15 PROBLEM — R91.1 PULMONARY NODULE: Status: ACTIVE | Noted: 2019-09-15

## 2019-09-15 PROBLEM — N83.209 OVARIAN CYST: Status: ACTIVE | Noted: 2019-09-15

## 2019-09-15 LAB
ANION GAP SERPL CALCULATED.3IONS-SCNC: 5 MMOL/L (ref 4–13)
ATRIAL RATE: 72 BPM
BUN SERPL-MCNC: 18 MG/DL (ref 5–25)
CALCIUM SERPL-MCNC: 8.5 MG/DL (ref 8.3–10.1)
CHLORIDE SERPL-SCNC: 109 MMOL/L (ref 100–108)
CO2 SERPL-SCNC: 28 MMOL/L (ref 21–32)
CREAT SERPL-MCNC: 1.3 MG/DL (ref 0.6–1.3)
ERYTHROCYTE [DISTWIDTH] IN BLOOD BY AUTOMATED COUNT: 15.5 % (ref 11.6–15.1)
GFR SERPL CREATININE-BSD FRML MDRD: 38 ML/MIN/1.73SQ M
GLUCOSE P FAST SERPL-MCNC: 97 MG/DL (ref 65–99)
GLUCOSE SERPL-MCNC: 97 MG/DL (ref 65–140)
HCT VFR BLD AUTO: 39.6 % (ref 34.8–46.1)
HGB BLD-MCNC: 12.2 G/DL (ref 11.5–15.4)
MCH RBC QN AUTO: 26.9 PG (ref 26.8–34.3)
MCHC RBC AUTO-ENTMCNC: 30.8 G/DL (ref 31.4–37.4)
MCV RBC AUTO: 87 FL (ref 82–98)
P AXIS: 78 DEGREES
PLATELET # BLD AUTO: 236 THOUSANDS/UL (ref 149–390)
PMV BLD AUTO: 11.5 FL (ref 8.9–12.7)
POTASSIUM SERPL-SCNC: 3.9 MMOL/L (ref 3.5–5.3)
PR INTERVAL: 138 MS
QRS AXIS: 51 DEGREES
QRSD INTERVAL: 84 MS
QT INTERVAL: 400 MS
QTC INTERVAL: 438 MS
RBC # BLD AUTO: 4.54 MILLION/UL (ref 3.81–5.12)
SODIUM SERPL-SCNC: 142 MMOL/L (ref 136–145)
T WAVE AXIS: 66 DEGREES
TROPONIN I SERPL-MCNC: <0.02 NG/ML
VENTRICULAR RATE: 72 BPM
WBC # BLD AUTO: 9.27 THOUSAND/UL (ref 4.31–10.16)

## 2019-09-15 PROCEDURE — 99285 EMERGENCY DEPT VISIT HI MDM: CPT

## 2019-09-15 PROCEDURE — 99219 PR INITIAL OBSERVATION CARE/DAY 50 MINUTES: CPT | Performed by: SURGERY

## 2019-09-15 PROCEDURE — 72072 X-RAY EXAM THORAC SPINE 3VWS: CPT

## 2019-09-15 PROCEDURE — 80048 BASIC METABOLIC PNL TOTAL CA: CPT | Performed by: EMERGENCY MEDICINE

## 2019-09-15 PROCEDURE — 85027 COMPLETE CBC AUTOMATED: CPT | Performed by: EMERGENCY MEDICINE

## 2019-09-15 PROCEDURE — 84484 ASSAY OF TROPONIN QUANT: CPT | Performed by: EMERGENCY MEDICINE

## 2019-09-15 PROCEDURE — 36415 COLL VENOUS BLD VENIPUNCTURE: CPT | Performed by: EMERGENCY MEDICINE

## 2019-09-15 PROCEDURE — 99283 EMERGENCY DEPT VISIT LOW MDM: CPT | Performed by: EMERGENCY MEDICINE

## 2019-09-15 PROCEDURE — 93010 ELECTROCARDIOGRAM REPORT: CPT | Performed by: INTERNAL MEDICINE

## 2019-09-15 PROCEDURE — 1123F ACP DISCUSS/DSCN MKR DOCD: CPT | Performed by: INTERNAL MEDICINE

## 2019-09-15 PROCEDURE — 99204 OFFICE O/P NEW MOD 45 MIN: CPT | Performed by: PHYSICIAN ASSISTANT

## 2019-09-15 RX ORDER — HYDROMORPHONE HCL/PF 1 MG/ML
0.2 SYRINGE (ML) INJECTION EVERY 4 HOURS PRN
Status: DISCONTINUED | OUTPATIENT
Start: 2019-09-15 | End: 2019-09-17 | Stop reason: HOSPADM

## 2019-09-15 RX ORDER — PANTOPRAZOLE SODIUM 20 MG/1
20 TABLET, DELAYED RELEASE ORAL
Status: DISCONTINUED | OUTPATIENT
Start: 2019-09-15 | End: 2019-09-17 | Stop reason: HOSPADM

## 2019-09-15 RX ORDER — OXYCODONE HYDROCHLORIDE 5 MG/1
5 TABLET ORAL EVERY 4 HOURS PRN
Status: DISCONTINUED | OUTPATIENT
Start: 2019-09-15 | End: 2019-09-17 | Stop reason: HOSPADM

## 2019-09-15 RX ORDER — ONDANSETRON 2 MG/ML
4 INJECTION INTRAMUSCULAR; INTRAVENOUS EVERY 6 HOURS PRN
Status: DISCONTINUED | OUTPATIENT
Start: 2019-09-15 | End: 2019-09-17 | Stop reason: HOSPADM

## 2019-09-15 RX ORDER — GABAPENTIN 100 MG/1
100 CAPSULE ORAL
Status: DISCONTINUED | OUTPATIENT
Start: 2019-09-15 | End: 2019-09-17 | Stop reason: HOSPADM

## 2019-09-15 RX ORDER — ACETAMINOPHEN 325 MG/1
975 TABLET ORAL EVERY 8 HOURS SCHEDULED
Status: DISCONTINUED | OUTPATIENT
Start: 2019-09-15 | End: 2019-09-17 | Stop reason: HOSPADM

## 2019-09-15 RX ORDER — FLUTICASONE FUROATE AND VILANTEROL 100; 25 UG/1; UG/1
1 POWDER RESPIRATORY (INHALATION) DAILY
Status: DISCONTINUED | OUTPATIENT
Start: 2019-09-15 | End: 2019-09-17 | Stop reason: HOSPADM

## 2019-09-15 RX ORDER — ASPIRIN 81 MG/1
81 TABLET, CHEWABLE ORAL DAILY
Status: DISCONTINUED | OUTPATIENT
Start: 2019-09-15 | End: 2019-09-17 | Stop reason: HOSPADM

## 2019-09-15 RX ORDER — OXYCODONE HYDROCHLORIDE 5 MG/1
2.5 TABLET ORAL EVERY 4 HOURS PRN
Status: DISCONTINUED | OUTPATIENT
Start: 2019-09-15 | End: 2019-09-17 | Stop reason: HOSPADM

## 2019-09-15 RX ORDER — DOCUSATE SODIUM 100 MG/1
100 CAPSULE, LIQUID FILLED ORAL 2 TIMES DAILY
Status: DISCONTINUED | OUTPATIENT
Start: 2019-09-15 | End: 2019-09-17 | Stop reason: HOSPADM

## 2019-09-15 RX ORDER — CITALOPRAM 20 MG/1
20 TABLET ORAL DAILY
Status: DISCONTINUED | OUTPATIENT
Start: 2019-09-15 | End: 2019-09-17 | Stop reason: HOSPADM

## 2019-09-15 RX ORDER — HEPARIN SODIUM 5000 [USP'U]/ML
5000 INJECTION, SOLUTION INTRAVENOUS; SUBCUTANEOUS EVERY 8 HOURS SCHEDULED
Status: DISCONTINUED | OUTPATIENT
Start: 2019-09-15 | End: 2019-09-17 | Stop reason: HOSPADM

## 2019-09-15 RX ORDER — METHOCARBAMOL 500 MG/1
250 TABLET, FILM COATED ORAL EVERY 6 HOURS PRN
Status: DISCONTINUED | OUTPATIENT
Start: 2019-09-15 | End: 2019-09-17 | Stop reason: HOSPADM

## 2019-09-15 RX ORDER — AMLODIPINE BESYLATE 5 MG/1
5 TABLET ORAL DAILY
Status: DISCONTINUED | OUTPATIENT
Start: 2019-09-15 | End: 2019-09-17 | Stop reason: HOSPADM

## 2019-09-15 RX ORDER — SODIUM CHLORIDE, SODIUM GLUCONATE, SODIUM ACETATE, POTASSIUM CHLORIDE, MAGNESIUM CHLORIDE, SODIUM PHOSPHATE, DIBASIC, AND POTASSIUM PHOSPHATE .53; .5; .37; .037; .03; .012; .00082 G/100ML; G/100ML; G/100ML; G/100ML; G/100ML; G/100ML; G/100ML
50 INJECTION, SOLUTION INTRAVENOUS CONTINUOUS
Status: DISCONTINUED | OUTPATIENT
Start: 2019-09-15 | End: 2019-09-17 | Stop reason: HOSPADM

## 2019-09-15 RX ORDER — LEVOTHYROXINE SODIUM 0.1 MG/1
100 TABLET ORAL
Status: DISCONTINUED | OUTPATIENT
Start: 2019-09-15 | End: 2019-09-17 | Stop reason: HOSPADM

## 2019-09-15 RX ADMIN — AMLODIPINE BESYLATE 5 MG: 5 TABLET ORAL at 08:35

## 2019-09-15 RX ADMIN — ASPIRIN 81 MG 81 MG: 81 TABLET ORAL at 08:35

## 2019-09-15 RX ADMIN — DOCUSATE SODIUM 100 MG: 100 CAPSULE, LIQUID FILLED ORAL at 17:18

## 2019-09-15 RX ADMIN — ACETAMINOPHEN 975 MG: 325 TABLET ORAL at 06:00

## 2019-09-15 RX ADMIN — GABAPENTIN 100 MG: 100 CAPSULE ORAL at 21:17

## 2019-09-15 RX ADMIN — PANTOPRAZOLE SODIUM 20 MG: 20 TABLET, DELAYED RELEASE ORAL at 07:49

## 2019-09-15 RX ADMIN — ACETAMINOPHEN 975 MG: 325 TABLET ORAL at 14:09

## 2019-09-15 RX ADMIN — LEVOTHYROXINE SODIUM 100 MCG: 100 TABLET ORAL at 07:49

## 2019-09-15 RX ADMIN — ACETAMINOPHEN 975 MG: 325 TABLET ORAL at 21:17

## 2019-09-15 RX ADMIN — CITALOPRAM HYDROBROMIDE 20 MG: 20 TABLET ORAL at 08:35

## 2019-09-15 RX ADMIN — OXYCODONE HYDROCHLORIDE 2.5 MG: 5 TABLET ORAL at 20:25

## 2019-09-15 RX ADMIN — SODIUM CHLORIDE, SODIUM GLUCONATE, SODIUM ACETATE, POTASSIUM CHLORIDE AND MAGNESIUM CHLORIDE 50 ML/HR: 526; 502; 368; 37; 30 INJECTION, SOLUTION INTRAVENOUS at 06:05

## 2019-09-15 RX ADMIN — HEPARIN SODIUM 5000 UNITS: 5000 INJECTION INTRAVENOUS; SUBCUTANEOUS at 21:17

## 2019-09-15 RX ADMIN — HEPARIN SODIUM 5000 UNITS: 5000 INJECTION INTRAVENOUS; SUBCUTANEOUS at 14:11

## 2019-09-15 RX ADMIN — HEPARIN SODIUM 5000 UNITS: 5000 INJECTION INTRAVENOUS; SUBCUTANEOUS at 06:05

## 2019-09-15 RX ADMIN — PANTOPRAZOLE SODIUM 20 MG: 20 TABLET, DELAYED RELEASE ORAL at 16:20

## 2019-09-15 RX ADMIN — DOCUSATE SODIUM 100 MG: 100 CAPSULE, LIQUID FILLED ORAL at 08:35

## 2019-09-15 NOTE — EMTALA/ACUTE CARE TRANSFER
[address] [address]  [address] [facility] [address]  Dept: [phone]      EMTALA TRANSFER CONSENT    NAME Neville Solomon                                         1935                              MRN [id number]    I have been informed of my rights regarding examination, treatment, and transfer   by Dr Shivani Lino, *    Benefits: Specialized equipment and/or services available at the receiving facility (Include comment)________________________(Trauma, neuro surgery)    Risks: Potential for delay in receiving treatment, Potential deterioration of medical condition, Loss of IV, Possible worsening of condition or death during transfer, Increased discomfort during transfer      Consent for Transfer:  I acknowledge that my medical condition has been evaluated and explained to me by the emergency department physician or other qualified medical person and/or my attending physician, who has recommended that I be transferred to the service of  Accepting Physician: Liz Parra at [address] Name, [address] : [facility]  The above potential benefits of such transfer, the potential risks associated with such transfer, and the probable risks of not being transferred have been explained to me, and I fully understand them  The doctor has explained that, in my case, the benefits of transfer outweigh the risks  I agree to be transferred  I authorize the performance of emergency medical procedures and treatments upon me in both transit and upon arrival at the receiving facility  Additionally, I authorize the release of any and all medical records to the receiving facility and request they be transported with me, if possible  I understand that the safest mode of transportation during a medical emergency is an ambulance and that the Hospital advocates the use of this mode of transport   Risks of traveling to the receiving facility by car, including absence of medical control, life sustaining equipment, such as oxygen, and medical personnel has been explained to me and I fully understand them  (SILVER CORRECT BOX BELOW)  [  ]  I consent to the stated transfer and to be transported by ambulance/helicopter  [  ]  I consent to the stated transfer, but refuse transportation by ambulance and accept full responsibility for my transportation by car  I understand the risks of non-ambulance transfers and I exonerate the Hospital and its staff from any deterioration in my condition that results from this refusal     X___________________________________________    DATE  09/15/19  TIME________  Signature of patient or legally responsible individual signing on patient behalf           RELATIONSHIP TO PATIENT_________________________          Provider Certification    NAME Sylvia Tran                                         1935                              MRN [id number]    A medical screening exam was performed on the above named patient  Based on the examination:    Condition Necessitating Transfer The primary encounter diagnosis was Fall, initial encounter  A diagnosis of C3 cervical fracture (HCC) was also pertinent to this visit      Patient Condition: The patient has been stabilized such that within reasonable medical probability, no material deterioration of the patient condition or the condition of the unborn child(el) is likely to result from the transfer    Reason for Transfer: Level of Care needed not available at this facility    Transfer Requirements: Facility [facility]   · Space available and qualified personnel available for treatment as acknowledged by    · Agreed to accept transfer and to provide appropriate medical treatment as acknowledged by       Brie Lopez  · Appropriate medical records of the examination and treatment of the patient are provided at the time of transfer   [address], [address] _______  · Transfer will be performed by qualified personnel from    and appropriate transfer equipment as required, including the use of necessary and appropriate life support measures  Provider Certification: I have examined the patient and explained the following risks and benefits of being transferred/refusing transfer to the patient/family:  General risk, such as traffic hazards, adverse weather conditions, rough terrain or turbulence, possible failure of equipment (including vehicle or aircraft), or consequences of actions of persons outside the control of the transport personnel, Unanticipated needs of medical equipment and personnel during transport, The possibility of a transport vehicle being unavailable, Risk of worsening condition      Based on these reasonable risks and benefits to the patient and/or the unborn child(el), and based upon the information available at the time of the patients examination, I certify that the medical benefits reasonably to be expected from the provision of appropriate medical treatments at another medical facility outweigh the increasing risks, if any, to the individuals medical condition, and in the case of labor to the unborn child, from effecting the transfer      X____________________________________________ DATE 09/15/19        TIME_______      ORIGINAL - SEND TO MEDICAL RECORDS   COPY - SEND WITH PATIENT DURING TRANSFER

## 2019-09-15 NOTE — ASSESSMENT & PLAN NOTE
Imaging personally reviewed  · CT recon only thoracolumbar:  1  Mild compression fractures superior endplate T3   2   Degenerative changes throughout the thoracolumbar spine  · Upright thoracic x-rays ordered and pending    Medical management:  · Discussed with the patient about everything  Technically patient should be in a CT all for best coverage, but she refuses to wear cervical collar at all  Will try TLSO backpack when out of bed and head of bed greater than 45°  · Pain control per primary team  · PT OT evaluation  · DVT prophylaxis:  SCDs, heparin subcutaneous  · Geriatrics consult  · Neurosurgery pending upright x-rays for baseline alignment    If stable will sign off and follow up in 2 weeks with repeat imaging

## 2019-09-15 NOTE — OCCUPATIONAL THERAPY NOTE
Occupational Therapy Cancellation Note  OT orders received, pt's chart reviewed  Pt is pending neurosurgery consult  Will hold off on initial OT evaluation at this time      Warren Cushing, NARCISO, OTR/L

## 2019-09-15 NOTE — CONSULTS
Consult- Paulino Gómez 1935, 82 y o  female MRN: [id number]    Unit/Bed#: ED 09 Encounter: [id number]    Primary Care Provider: Sheldon Rosenbaum MD   Date and time admitted to hospital: 9/15/2019  2:54 AM   Consult completed 9/15/19 at 7:20am      Inpatient consult to Neurosurgery  Consult performed by: Priya Galvin PA-C  Consult ordered by: Kwasi Reyes MD          * Closed compression fracture of thoracic vertebra [facility])  Assessment & Plan  Imaging personally reviewed  · CT recon only thoracolumbar:  1  Mild compression fractures superior endplate T3   2   Degenerative changes throughout the thoracolumbar spine  · Upright thoracic x-rays ordered and pending    Medical management:  · Discussed with the patient about everything  Technically patient should be in a CT all for best coverage, but she refuses to wear cervical collar at all  Will try TLSO backpack when out of bed and head of bed greater than 45°  · Pain control per primary team  · PT OT evaluation  · DVT prophylaxis:  SCDs, heparin subcutaneous  · Geriatrics consult  · Neurosurgery pending upright x-rays for baseline alignment  If stable will sign off and follow up in 2 weeks with repeat imaging        History of Present Illness   HPI: Paulino Gómez is a 82y o  year old female with PMH including hypertension, hyperlipidemia, hiatal hernia, GERD, glaucoma, hypothyroidism, deaf in the right ear, asthma and COPD who presents with complaint of headache, neck pain, back pain after a fall a week ago  She states she came in because her son made her  She fell trying to get up in the middle of the night to go to the bathroom  She states that she passed out and fell in between her and table and her bed  She admits to getting up on her own but it took a very long time  She admits to taking Tylenol at home with minimal relief  She states she was having increasing pain in her back when attempting to move    She was evaluated in the ED, a CT scan was completed and showed a T3 superior endplate compression  Review of Systems   Constitutional: Negative for fever  HENT: Positive for hearing loss and nosebleeds  Negative for tinnitus and trouble swallowing  Eyes: Positive for visual disturbance  Negative for photophobia  Respiratory: Negative for shortness of breath  Cardiovascular: Positive for chest pain  Gastrointestinal: Negative for abdominal pain, constipation, diarrhea, nausea and vomiting  Genitourinary: Negative for dysuria, frequency and urgency  Musculoskeletal: Positive for back pain, gait problem and neck pain  Skin: Positive for wound  Negative for rash  Allergic/Immunologic: Positive for environmental allergies  Negative for food allergies  Neurological: Positive for syncope, weakness and headaches  Negative for dizziness and numbness  Hematological: Bruises/bleeds easily  Psychiatric/Behavioral: Positive for confusion (initially but resolved)  The patient is not nervous/anxious  Historical Information   Past Medical History:   Diagnosis Date    Angina pectoris ([facility] [address] )     Arthritis     Asthma     [facility]     Deaf, right     Disease of thyroid gland     hypothyroidism    GERD (gastroesophageal reflux disease)     Glaucoma     Hiatal hernia     Hyperlipidemia     Hypertension     Renal disorder      Past Surgical History:   Procedure Laterality Date    ABDOMINAL SURGERY      exploratory surgery    APPENDECTOMY      BREAST SURGERY      Bilateral biopsys, benign    COLONOSCOPY N/A 8/14/2018    Procedure: COLONOSCOPY;  Surgeon: Evelena Phalen, MD;  Location: [facility];   Service: Gastroenterology    EAR SURGERY      EYE SURGERY      cataracts     KNEE ARTHROSCOPY Left     shoulder    NOSE SURGERY      rhinoplasty    TONSILLECTOMY       Social History     Substance and Sexual Activity   Alcohol Use No     Social History     Substance and Sexual Activity   Drug Use No     Social History     Tobacco Use   Smoking Status Former Smoker    Last attempt to quit: 1990    Years since quittin 1   Smokeless Tobacco Never Used     Family History   Problem Relation Age of Onset    Heart disease Mother     Heart disease Father     Diabetes Brother     Cancer Brother     Cancer Son         lymphoma       Meds/Allergies   all current active meds have been reviewed and current meds:   Current Facility-Administered Medications   Medication Dose Route Frequency    acetaminophen (TYLENOL) tablet 975 mg  975 mg Oral Q8H [address]    amLODIPine (NORVASC) tablet 5 mg  5 mg Oral Daily    aspirin chewable tablet 81 mg  81 mg Oral Daily    citalopram (CeleXA) tablet 20 mg  20 mg Oral Daily    docusate sodium (COLACE) capsule 100 mg  100 mg Oral BID    fluticasone-vilanterol (BREO ELLIPTA) 100-25 mcg/inh inhaler 1 puff  1 puff Inhalation Daily    gabapentin (NEURONTIN) capsule 100 mg  100 mg Oral HS    heparin (porcine) subcutaneous injection 5,000 Units  5,000 Units Subcutaneous Q8H [address]    HYDROmorphone (DILAUDID) injection 0 2 mg  0 2 mg Intravenous Q4H PRN    levothyroxine tablet 100 mcg  100 mcg Oral Early Morning    methocarbamol (ROBAXIN) tablet 250 mg  250 mg Oral Q6H PRN    multi-electrolyte (ISOLYTE-S PH 7 4 equivalent) IV solution  50 mL/hr Intravenous Continuous    naloxone (NARCAN) 0 04 mg/mL syringe 0 04 mg  0 04 mg Intravenous Q1MIN PRN    ondansetron (ZOFRAN) injection 4 mg  4 mg Intravenous Q6H PRN    oxyCODONE (ROXICODONE) IR tablet 2 5 mg  2 5 mg Oral Q4H PRN    oxyCODONE (ROXICODONE) IR tablet 5 mg  5 mg Oral Q4H PRN    pantoprazole (PROTONIX) EC tablet 20 mg  20 mg Oral BID AC     Allergies   Allergen Reactions    Erythromycin Anaphylaxis, Rash and Tremor    Pravastatin Other (See Comments)     Pt  Not sure  Objective   I/O     None          Physical Exam   Constitutional: She is oriented to person, place, and time   She appears well-developed and well-nourished  HENT:   Head: Normocephalic  Jill-orbital ecchymosis and overlying bridge of nose   Eyes: Pupils are equal, round, and reactive to light  EOM are normal    Neck: No spinous process tenderness present  Cardiovascular: Normal rate  Pulmonary/Chest: Effort normal  No respiratory distress  Abdominal: Soft  There is tenderness in the epigastric area  There is no rebound  Musculoskeletal:        Cervical back: She exhibits no bony tenderness  Thoracic back: She exhibits bony tenderness  Lumbar back: She exhibits no bony tenderness  Midline T3 tenderness    Neurological: She is alert and oriented to person, place, and time  She has normal strength  She has a normal Finger-Nose-Finger Test    Reflex Scores:       Bicep reflexes are 1+ on the right side and 1+ on the left side  Brachioradialis reflexes are 1+ on the right side and 1+ on the left side  Patellar reflexes are 1+ on the right side and 1+ on the left side  Achilles reflexes are 1+ on the right side and 1+ on the left side  Skin: Skin is warm  Psychiatric: Her speech is normal and behavior is normal  Thought content normal      Neurologic Exam     Mental Status   Oriented to person, place, and time  Registration: recalls 3 of 3 objects  Recall at 5 minutes: recalls 1 of 3 objects  Follows 2 step commands  Attention: normal  Concentration: normal    Speech: speech is normal   Level of consciousness: alert  Knowledge: good  Able to perform simple calculations  Able to name object  Able to repeat  Normal comprehension  Cranial Nerves     CN II   Visual fields full to confrontation  CN III, IV, VI   Pupils are equal, round, and reactive to light  Extraocular motions are normal    Right pupil: Size: 3 mm  Shape: regular  Reactivity: brisk  Left pupil: Size: 3 mm  Shape: regular  Reactivity: brisk     CN III: no CN III palsy  CN VI: no CN VI palsy  Nystagmus: none Diplopia: none  Ophthalmoparesis: none  Upgaze: normal  Downgaze: normal  Conjugate gaze: present    CN V   Facial sensation intact  CN VII   Facial expression full, symmetric  CN VIII   Hearing: impaired    CN XII   Tongue deviation: none    Motor Exam   Muscle bulk: normal  Overall muscle tone: normal  Right arm pronator drift: absent  Left arm pronator drift: absent    Strength   Strength 5/5 throughout  Sensory Exam   Light touch normal    Proprioception normal      Gait, Coordination, and Reflexes     Coordination   Finger to nose coordination: normal    Tremor   Resting tremor: absent    Reflexes   Right brachioradialis: 1+  Left brachioradialis: 1+  Right biceps: 1+  Left biceps: 1+  Right patellar: 1+  Left patellar: 1+  Right achilles: 1+  Left achilles: 1+  Right Zuleta: absent  Left Zuleta: absent  Right ankle clonus: absent  Left ankle clonus: absent        Vitals:Blood pressure 141/75, pulse 64, temperature 97 6 °F (36 4 °C), temperature source Oral, resp  rate 17, SpO2 95 %, not currently breastfeeding  ,There is no height or weight on file to calculate BMI  Lab Results:   Results from last 7 days   Lab Units 09/15/19  0611 09/14/19  2112   WBC Thousand/uL 9 27 10 63*   HEMOGLOBIN g/dL 12 2 13 3   HEMATOCRIT % 39 6 43 8   PLATELETS Thousands/uL 236 271   NEUTROS PCT %  --  59   MONOS PCT %  --  11     Results from last 7 days   Lab Units 09/15/19  0611 09/14/19  2112   POTASSIUM mmol/L 3 9 3 9   CHLORIDE mmol/L 109* 103   CO2 mmol/L 28 30   BUN mg/dL 18 19   CREATININE mg/dL 1 30 1 38*   CALCIUM mg/dL 8 5 9 1                 No results found for: TROPONINT  ABG:No results found for: PHART, GTX4GGJ, PO2ART, XUM2BUF, T4WYKTVP, BEART, SOURCE    Imaging Studies: I have personally reviewed pertinent reports  and I have personally reviewed pertinent films in PACS    Ct Head Without Contrast    Result Date: 9/15/2019  Narrative: CT BRAIN - WITHOUT CONTRAST INDICATION:   trauma    History of fall getting out of bed 5 days ago  Hit head on nightstand  Loss of consciousness  On aspirin  COMPARISON:  None  TECHNIQUE:  CT examination of the brain was performed  In addition to axial images, coronal 2D reformatted images were created and submitted for interpretation  Radiation dose length product (DLP) for this visit:  869 mGy-cm   This examination, like all CT scans performed in the [facility], was performed utilizing techniques to minimize radiation dose exposure, including the use of iterative reconstruction and automated exposure control  IMAGE QUALITY:  Diagnostic  FINDINGS: PARENCHYMA: Decreased attenuation is noted in periventricular and subcortical white matter demonstrating an appearance that is statistically most likely to represent moderate microangiopathic change  No CT signs of acute infarction  No intracranial mass, mass effect or midline shift  No acute parenchymal hemorrhage  Bilateral internal carotid artery and vertebral artery calcifications  VENTRICLES AND EXTRA-AXIAL SPACES:  Enlargement of ventricles and extra-axial CSF spaces, out of proportion to the patient's age most consistent with cerebral and cerebellar atrophy  VISUALIZED ORBITS AND PARANASAL SINUSES:  Unremarkable  CALVARIUM AND EXTRACRANIAL SOFT TISSUES:  Normal      Impression: Moderate cerebral atrophy with chronic small vessel ischemic change  No acute intracranial abnormality  Findings are consistent with the preliminary report from [facility] which was provided shortly after completion of the exam  Workstation performed: [id number]     Ct Facial Bones Without Contrast    Result Date: 9/15/2019  Narrative: CT FACIAL BONES WITHOUT INTRAVENOUS CONTRAST INDICATION:   Facial trauma, fx suspected  History of fall getting out of bed 5 days ago  Hit head on nightstand  Loss of consciousness  On aspirin  COMPARISON: None   TECHNIQUE:  Axial CT images were obtained through the facial bones with additional sagittal and coronal reconstructions  Radiation dose length product (DLP) for this visit:  296 mGy-cm   This examination, like all CT scans performed in the [facility], was performed utilizing techniques to minimize radiation dose exposure, including the use of iterative reconstruction and automated exposure control  IMAGE QUALITY:  Diagnostic  FINDINGS: FACIAL BONES:   No facial bone fracture identified  Normal alignment of the temporomandibular joints  No lytic or blastic lesion  ORBITS:  Orbital globes, optic nerves, and extraocular muscles appear symmetric and normal  There is no evidence of retrobulbar mass, abscess, or hematoma  SINUSES:  Normal  Trace fluid in the dependent right-sided mastoid air cells  Postoperative changes in the right middle ear  SOFT TISSUES:  Normal      Impression: No evidence of traumatic facial bone injury  Findings are consistent with the preliminary report from [facility] which was provided shortly after completion of the exam  Workstation performed: [id number]     Ct Spine Cervical Without Contrast    Result Date: 9/15/2019  Narrative: CT CERVICAL SPINE - WITHOUT CONTRAST INDICATION:   trauma  History of fall getting out of bed 5 days ago  Hit head on nightstand  Loss of consciousness  On aspirin  COMPARISON:  None  TECHNIQUE:  CT examination of the cervical spine was performed without intravenous contrast   Contiguous axial images were obtained  Sagittal and coronal reconstructions were performed  Radiation dose length product (DLP) for this visit:  501 mGy-cm   This examination, like all CT scans performed in the [facility], was performed utilizing techniques to minimize radiation dose exposure, including the use of iterative reconstruction and automated exposure control  IMAGE QUALITY:  Diagnostic  FINDINGS: ALIGNMENT:  Straightening and reversal of the cervical lordotic curvature    Degenerative grade 1 anterolisthesis C3 on C4  Degenerative grade 1 retrolisthesis C5 on C6  VERTEBRAL BODIES:  Osseous structures demineralized  No acute fracture involving the cervical vertebral bodies  Slight loss in the axial height of the T3 vertebral body along its superior endplate  DEGENERATIVE CHANGES:  Moderate multilevel cervical degenerative changes are noted  No critical central canal stenosis  Degenerative changes at the atlantoaxial articulation with pannus formation  This results in mild narrowing at the craniocervical junction  PREVERTEBRAL AND PARASPINAL SOFT TISSUES:  Unremarkable  THORACIC INLET:  Normal      Impression: 1  Degenerative changes cervical spine  No acute cervical spine fracture or traumatic malalignment  2   Slight loss in the axial height of the T3 vertebral body along its superior endplate  Mild compression fracture not excluded  Findings are consistent with the preliminary report from [facility] which was provided shortly after completion of the exam   Workstation performed: [id number]     Ct Chest Abdomen Pelvis W Contrast    Result Date: 9/15/2019  Narrative: CT CHEST, ABDOMEN AND PELVIS WITH IV CONTRAST INDICATION:   trauma  History of fall getting out of bed 5 days ago  Hit head on nightstand  Loss of consciousness  On aspirin  COMPARISON:  CT abdomen pelvis August 10, 2018 TECHNIQUE: CT examination of the chest, abdomen and pelvis was performed  Axial, sagittal, and coronal 2D reformatted images were created from the source data and submitted for interpretation  Radiation dose length product (DLP) for this visit:  1346 mGy-cm   This examination, like all CT scans performed in the [facility], was performed utilizing techniques to minimize radiation dose exposure, including the use of iterative reconstruction and automated exposure control  IV Contrast:  100 mL of iohexol (OMNIPAQUE) Enteric Contrast: Enteric contrast was not administered   FINDINGS: CHEST LUNGS:  The lungs are hyperinflated  No focal infiltrates  Mild emphysematous changes bilaterally  There are multiple subcentimeter noncalcified pulmonary nodules bilaterally, largest measuring 5 mm in the lateral aspect right lower lobe (series 3, image 60)  Eventration of the right hemidiaphragm  PLEURA:  Unremarkable  HEART/GREAT VESSELS:  Coronary artery calcifications  No evidence of a pericardial effusion  MEDIASTINUM AND JASON:  Unremarkable  CHEST WALL AND LOWER NECK:   Unremarkable  ABDOMEN LIVER/BILIARY TREE:  Fatty infiltrative changes in the liver  GALLBLADDER:  No calcified gallstones  No pericholecystic inflammatory change  SPLEEN:  Unremarkable  PANCREAS:  Unremarkable  ADRENAL GLANDS:  Unremarkable  KIDNEYS/URETERS:  Unremarkable  No hydronephrosis  STOMACH AND BOWEL:  Stomach is markedly distended and filled with ingested food products  Hiatal hernia  Thickening of the distal esophagus  No evidence of small bowel obstruction  Normal fecal burden throughout the colon  Scattered diverticula in the descending colon and sigmoid colon  Nothing to suggest acute diverticulitis  APPENDIX:  Surgically absent  ABDOMINOPELVIC CAVITY:  No ascites or free intraperitoneal air  No lymphadenopathy  VESSELS:  Atherosclerotic changes are present  No evidence of aneurysm  PELVIS REPRODUCTIVE ORGANS:  11 mm left ovarian cyst  URINARY BLADDER:  Incompletely distended  Inadequately evaluated  ABDOMINAL WALL/INGUINAL REGIONS:  Small umbilical hernia containing fat  OSSEOUS STRUCTURES:  Osseous structures demineralized  Slight loss in the axial height of the T3 vertebral body along its superior endplate  Disc space narrowing diffusely throughout the thoracolumbar spine  Bony ankylosis of the thoracolumbar spine  Impression: 1  No traumatic solid or visceral organ injury   2   Mild compression fracture superior endplate T3  3   Subcentimeter noncalcified bilateral pulmonary nodules, largest measuring 5 mm right lower lobe  Based on current Fleischner Society 2017 Guidelines on incidental pulmonary nodule, no routine follow-up is needed if the patient is considered low risk for lung cancer  If the patient is considered high risk for lung cancer, 12 month follow-up non-contrast chest CT is recommended  4   Incidental 11 mm left ovarian cyst   According to current guidelines (J Am Jun Radiol 2013;10:671-681) in this late postmenopausal woman, this should be evaluated by routine pelvic ultrasound  If this lesion is symptomatic, consider further evaluation with pelvic ultrasound now  The major findings are in agreement with the preliminary report provided by [facility] which was provided shortly after completion of the exam  The additional finding of  11 mm left ovarian cyst, will now be communicated with patient's clinical team by our radiology liaison  The study was marked in [facility] for immediate notification  Workstation performed: [id number]     Ct Recon Only Thoracolumbar    Result Date: 9/15/2019  Narrative: CT THORACIC AND LUMBAR SPINE INDICATION:   trauma  History of fall getting out of bed 5 days ago  Hit head on nightstand  Loss of consciousness  On aspirin  COMPARISON:  MR lumbar spine August 30, 2016 TECHNIQUE: Axial CT examination of the thoracic and lumbar spine was obtained utilizing reconstructed images from CT of the chest abdomen and pelvis performed the same day  Images were reformatted in the sagittal and coronal planes  This examination, like all CT scans performed in the [facility], was performed utilizing techniques to minimize radiation dose exposure, including the use of iterative reconstruction and automated exposure control  FINDINGS: ALIGNMENT: Normal alignment of lumbar vertebral bodies  No subluxation  VERTEBRAL BODIES: Osseous structures demineralized    Mild loss in the axial height of the T3 vertebral body along its superior endplate, compatible with mild compression fracture  Degenerative endplate changes diffusely throughout the thoracolumbar spine, most pronounced L2-L3  DEGENERATIVE CHANGES: Moderate multilevel degenerative disc disease  PREVERTEBRAL AND PARASPINAL SOFT TISSUES: Normal      Impression: 1  Mild compression fracture superior endplate T3  2   Degenerative changes throughout the thoracolumbar spine  Degenerative changes within the thoracic spine have progressed from the prior study  Findings are consistent with the preliminary report from [facility] which was provided shortly after completion of the exam  Workstation performed: [id number]     EKG, Pathology, and Other Studies: I have personally reviewed pertinent reports     and I have personally reviewed pertinent films in PACS    VTE Prophylaxis: Sequential compression device (Venodyne)  and Heparin    Code Status: Level 1 - Full Code  Advance Directive and Living Will:      Power of :    POLST:

## 2019-09-15 NOTE — H&P
H&P Exam - Trauma   Paige Lopez 82 y o  female MRN: [id number]  Unit/Bed#: ED 09 Encounter: [id number]    Assessment/Plan   Trauma Alert: Evaluation and Other Transfer  Model of Arrival: Ambulance  Trauma Team: Attending Maria E Alejo and Residents Master  Consultants: Neurosurgery: Routine  Time Called      Trauma Active Problems:     Possible C3 superior endplate fracture  Fall out of bed  Right shoulder ecchymosis    Trauma Plan:    -admit to Trauma Service  -neurosurgery consult  -right shoulder and left femur fractures appear negative for fracture, follow-up official read  -F/U official radiology reads of CT scans  -maintain Aspen collar  -neuro checks  -scheduled Tylenol, Robaxin, gabapentin  -p r n  Geriatric dosed narcotics for breakthrough pain  -geriatrics consult  -heparin, SCDs  -NPO pending neurosurgery evaluation  -PT/OT  -tertiary survey in a   Chief Complaint:  Fall 5 days ago    History of Present Illness   HPI:  Paige Lopez is a 82 y o  female with history of chronic aspirin use who presents with a fall from standing 5 days ago  She states that she woke up to go to the bathroom, and slipped out of bed, striking her head on the corner of the nightstand, and striking her body on the floor  The patient had facial pain, swelling, neck pain, and right arm pain, so she eventually went to the emergency department for evaluation  The patient had extensive workup, which revealed a possible C3 fracture  She was noted to be quite tender on exam, so she was placed aspirin collar, and transferred to [facility] for further evaluation, trauma evaluation, and evaluation by Neurosurgery  The patient states that her pain is currently controlled  She denies any numbness, tingling, or weakness of the extremities     Mechanism:Fall    Review of Systems   Constitutional: Negative for chills and fever  HENT: Positive for facial swelling  Negative for congestion and rhinorrhea      Eyes: Negative for photophobia and visual disturbance  Respiratory: Negative for cough and shortness of breath  Cardiovascular: Negative for chest pain and palpitations  Gastrointestinal: Negative for abdominal pain, diarrhea, nausea and vomiting  Genitourinary: Negative for dysuria and frequency  Musculoskeletal: Positive for neck pain  Negative for neck stiffness  Skin: Negative for pallor and rash  Neurological: Negative for light-headedness and headaches  All other systems reviewed and are negative  Historical Information       Past Medical History:   Diagnosis Date    Angina pectoris (HCC)     Arthritis     Asthma     COPD (chronic obstructive pulmonary disease) (HCC)     Disease of thyroid gland     GERD (gastroesophageal reflux disease)     Hyperlipidemia     Hypertension     Renal disorder      Past Surgical History:   Procedure Laterality Date    ABDOMINAL SURGERY      APPENDECTOMY      BREAST SURGERY      Bilateral biopsys, benign    COLONOSCOPY N/A 2018    Procedure: COLONOSCOPY;  Surgeon: Xavi Montes De Oca MD;  Location: [facility]; Service: Gastroenterology    EYE SURGERY      KNEE ARTHROSCOPY Left     shoulder    NOSE SURGERY      TONSILLECTOMY       Social History   Social History     Substance and Sexual Activity   Alcohol Use No     Social History     Substance and Sexual Activity   Drug Use No     Social History     Tobacco Use   Smoking Status Former Smoker    Last attempt to quit: 1990    Years since quittin 1   Smokeless Tobacco Never Used       There is no immunization history on file for this patient  Last Tetanus:  Unknown  Family History: Non-contributory  Unable to obtain/limited by no limitations      Meds/Allergies   all current active meds have been reviewed    Allergies   Allergen Reactions    Erythromycin Anaphylaxis, Rash and Tremor    Pravastatin Other (See Comments)     Pt  Not sure            PHYSICAL EXAM    PE limited by:  No limitations    Objective   Vitals:   First set: Temperature: 97 6 °F (36 4 °C) (09/15/19 0309)  Pulse: 66 (09/15/19 0309)  Respirations: 18 (09/15/19 0309)  Blood Pressure: 143/64 (09/15/19 0309)    Primary Survey:   (A) Airway:  Patent, intact  (B) Breathing:  Clear and equal bilaterally  (C) Circulation: Pulses:   radial  2/4  (D) Disabliity:  GCS Total:  15  (E) Expose:  Completed    Secondary Survey: (Click on Physical Exam tab above)  Physical Exam   Constitutional: She is oriented to person, place, and time  Awake alert, well appearing, no acute distress  Nontoxic  HENT:   Head: Normocephalic and atraumatic  Mouth/Throat: Oropharynx is clear and moist  No oropharyngeal exudate  Faint facial ecchymosis present  No hemotympanum bilaterally  No anterior septal hematoma  No significant tenderness to palpation   Eyes: Pupils are equal, round, and reactive to light  EOM are normal  Right eye exhibits no discharge  Left eye exhibits no discharge  No scleral icterus  Neck: Normal range of motion  Neck supple  No JVD present  No tracheal deviation present  Cervical collar in place  Diffuse C-spine tenderness is present  Cardiovascular: Normal rate, regular rhythm and normal heart sounds  No murmur heard  Pulmonary/Chest: Effort normal  No stridor  No respiratory distress  She has no wheezes  She has no rales  Abdominal: Soft  She exhibits no distension and no mass  There is no tenderness  Musculoskeletal: Normal range of motion  She exhibits no edema, tenderness or deformity  Neurological: She is alert and oriented to person, place, and time  No cranial nerve deficit  She exhibits normal muscle tone  5/5 and equal strength in all extremities bilaterally  Sensation grossly intact and equal in all extremities  No cranial nerve deficits appreciated  Alert and oriented x3  GCS 15  Skin: Skin is warm and dry  No rash noted  No pallor         Invasive Devices     Peripheral Intravenous Line            Peripheral IV 09/14/19 Right Antecubital less than 1 day                Lab Results: Results: I have personally reviewed pertinent reports  Imaging/EKG Studies: Results: I have personally reviewed pertinent reports      Other Studies:  None    Code Status: Level 1 - Full Code  Advance Directive and Living Will:      Power of :    POLST:

## 2019-09-15 NOTE — PLAN OF CARE
Problem: PAIN - ADULT  Goal: Verbalizes/displays adequate comfort level or baseline comfort level  Description  Interventions:  - Encourage patient to monitor pain and request assistance  - Assess pain using appropriate pain scale  - Administer analgesics based on type and severity of pain and evaluate response  - Implement non-pharmacological measures as appropriate and evaluate response  - Consider cultural and social influences on pain and pain management  - Notify physician/advanced practitioner if interventions unsuccessful or patient reports new pain  Outcome: Progressing     Problem: INFECTION - ADULT  Goal: Absence or prevention of progression during hospitalization  Description  INTERVENTIONS:  - Assess and monitor for signs and symptoms of infection  - Monitor lab/diagnostic results  - Monitor all insertion sites, i e  indwelling lines, tubes, and drains  - Monitor endotracheal if appropriate and nasal secretions for changes in amount and color  - [place] appropriate cooling/warming therapies per order  - Administer medications as ordered  - Instruct and encourage patient and family to use good hand hygiene technique  - Identify and instruct in appropriate isolation precautions for identified infection/condition  Outcome: Progressing     Problem: SAFETY ADULT  Goal: Patient will remain free of falls  Description  INTERVENTIONS:  - Assess patient frequently for physical needs  -  Identify cognitive and physical deficits and behaviors that affect risk of falls    -  D[place] fall precautions as indicated by assessment   - Educate patient/family on patient safety including physical limitations  - Instruct patient to call for assistance with activity based on assessment  - Modify environment to reduce risk of injury  - Consider OT/PT consult to assist with strengthening/mobility  Outcome: Progressing  Goal: Maintain or return to baseline ADL function  Description  INTERVENTIONS:  -  Assess patient's ability to carry out ADLs; assess patient's baseline for ADL function and identify physical deficits which impact ability to perform ADLs (bathing, care of mouth/teeth, toileting, grooming, dressing, etc )  - Assess/evaluate cause of self-care deficits   - Assess range of motion  - Assess patient's mobility; develop plan if impaired  - Assess patient's need for assistive devices and provide as appropriate  - Encourage maximum independence but intervene and supervise when necessary  - Involve family in performance of ADLs  - Assess for home care needs following discharge   - Consider OT consult to assist with ADL evaluation and planning for discharge  - Provide patient education as appropriate  Outcome: Progressing  Goal: Maintain or return mobility status to optimal level  Description  INTERVENTIONS:  - Assess patient's baseline mobility status (ambulation, transfers, stairs, etc )    - Identify cognitive and physical deficits and behaviors that affect mobility  - Identify mobility aids required to assist with transfers and/or ambulation (gait belt, sit-to-stand, lift, walker, cane, etc )  - [place] fall precautions as indicated by assessment  - Record patient progress and toleration of activity level on Mobility SBAR; progress patient to next Phase/Stage  - Instruct patient to call for assistance with activity based on assessment  - Consider rehabilitation consult to assist with strengthening/weightbearing, etc   Outcome: Progressing     Problem: DISCHARGE PLANNING  Goal: Discharge to home or other facility with appropriate resources  Description  INTERVENTIONS:  - Identify barriers to discharge w/patient and caregiver  - Arrange for needed discharge resources and transportation as appropriate  - Identify discharge learning needs (meds, wound care, etc )  - Arrange for interpretive services to assist at discharge as needed  - Refer to Case Management Department for coordinating discharge planning if the patient needs post-hospital services based on physician/advanced practitioner order or complex needs related to functional status, cognitive ability, or social support system  Outcome: Progressing     Problem: Knowledge Deficit  Goal: Patient/family/caregiver demonstrates understanding of disease process, treatment plan, medications, and discharge instructions  Description  Complete learning assessment and assess knowledge base    Interventions:  - Provide teaching at level of understanding  - Provide teaching via preferred learning methods  Outcome: Progressing

## 2019-09-15 NOTE — ED NOTES
Pt took off C-Collar, states is bothering her and that someone was coming in to remove it       Nadira Alejo  09/15/19 [id number]

## 2019-09-15 NOTE — ORTHOTIC NOTE
Orthotic Note            Date: 9/15/2019      Patient Name: Jaymie Moseley            Reason for Consult:  Patient Active Problem List   Diagnosis    Vomiting    Hypertension    Generalized abdominal pain    Leukocytosis    KRISTYN (acute kidney injury) ([facility] [address] )    Rash    Hematuria    Colon wall thickening    Closed compression fracture of thoracic vertebra (HCC)    Pulmonary nodule    Ovarian cyst    Possible Closed C3 fracture ([facility] [address] )     Ortho Tech educated/measured/fit/donned pt for a [employer] TLSO brace while sitting upright at the EOB  Adjusted B/L waist to size XL for optimal fit/comfort  Pt tolerated well without complaint  Pt able to demonstrate proper doffing/donning technique  Assisted pt with ambulating in the room post fitting of Backpack TLSO brace  Pt states the Backpack TLSO brace feels comfortable  Reviewed instructions with pt x3  Pt states she has no further questions/concerns  RN aware  Pt call bell, phone, instructions and tray within reach  Ortho Tech will continue daily follow up  Recommendations:  Please contact Ortho Tech [phone] regarding bracing instructions/adjustments  Thank you!   Chiquis Wyatt BS, Restorative Technician, [employer]

## 2019-09-15 NOTE — ED NOTES
Collar cleared by Neurosurgery   Patient to have spine brace ordered along with Xray     Marolyn Riedel, POPEYE  09/15/19 2247

## 2019-09-15 NOTE — ED NOTES
Per conversation with Dr Frederick Zmimer patient was fit with an [address] [place] [address] Cervical Orthosis for C-3 End-plate Fracture  Patient was given wear and care instructions and spare pad set was sent to [facility] with patient       Gabby Pendleton  09/15/19 0080

## 2019-09-15 NOTE — TELEPHONE ENCOUNTER
Date/Time Called in: 09/15/2019 @ 1220  Type of Consult:Routine  Facility: [place] [id number]  Unit:P6   Room:603   Phone: [phone]  Referring Physician: Dr Sophie Fan  Physician/Practice Consulted:Gerontology  Patient: Carmen Wilkinson  Medical Record Number: [id number]  Admitting Diagnosis:Closed compression fracture  Reason for Consult:C-3 Cervical fracture   Which Physician Notified:   Date/ Time Physician Notified:

## 2019-09-15 NOTE — ED PROVIDER NOTES
History  Chief Complaint   Patient presents with    Fall     PT co falling while getting out of bed last week she fell hitting her head on the night stand  +LOC, + blood thinners  PT co of chest pain and back pain  82 y o  F presents for eval after a fall 5 days ago  She fell out of bed hitting her head on the nightstand  She lost consciousness  She awoke with her head on the night  her body on the floor  She presents with facial bruising, neck pain, thoracic back pain, chest pain, epigastric abdominal pain  Also has right shoulder pain and left femur pain and bruising to these areas  Takes daily aspirin, 81 mg  No other blood thinners  Prior to Admission Medications   Prescriptions Last Dose Informant Patient Reported? Taking?    amLODIPine (NORVASC) 5 mg tablet   Yes Yes   Sig: Take by mouth   aspirin 81 MG tablet   Yes Yes   Sig: Take by mouth   citalopram (CeleXA) 20 mg tablet   Yes Yes   Sig: citalopram 20 mg tablet   fluticasone-salmeterol (ADVAIR DISKUS) 250-50 mcg/dose inhaler   Yes No   Sig: Advair Diskus 250 mcg-50 mcg/dose powder for inhalation   gabapentin (NEURONTIN) 100 mg capsule   Yes Yes   Sig: gabapentin 100 mg capsule   levothyroxine 100 mcg tablet   Yes Yes   Sig: Take by mouth   lisinopril (ZESTRIL) 20 mg tablet   Yes Yes   Sig: Take by mouth   nystatin (MYCOSTATIN) ointment   Yes Yes   Sig: Nystatin 080628 UNIT/GM External Ointment  APPLY INCH  PRN   Refills: 0    Active   omeprazole (PriLOSEC) 20 mg delayed release capsule   Yes Yes   Sig: Take 20 mg by mouth 2 (two) times a day      Facility-Administered Medications: None       Past Medical History:   Diagnosis Date    Angina pectoris (HCC)     Arthritis     Asthma     COPD (chronic obstructive pulmonary disease) (HCC)     Deaf, right     Disease of thyroid gland     hypothyroidism    GERD (gastroesophageal reflux disease)     Glaucoma     Hiatal hernia     Hyperlipidemia     Hypertension     Renal disorder        Past Surgical History:   Procedure Laterality Date    ABDOMINAL SURGERY      exploratory surgery    APPENDECTOMY      BREAST SURGERY      Bilateral biopsys, benign    COLONOSCOPY N/A 2018    Procedure: COLONOSCOPY;  Surgeon: Xiomara Padilla MD;  Location: [facility]; Service: Gastroenterology    EAR SURGERY      EYE SURGERY      cataracts     KNEE ARTHROSCOPY Left     shoulder    NOSE SURGERY      rhinoplasty    TONSILLECTOMY         Family History   Problem Relation Age of Onset    Heart disease Mother     Heart disease Father     Diabetes Brother     Cancer Brother     Cancer Son         lymphoma     I have reviewed and agree with the history as documented  Social History     Tobacco Use    Smoking status: Former Smoker     Last attempt to quit: 1990     Years since quittin 1    Smokeless tobacco: Never Used   Substance Use Topics    Alcohol use: Not Currently    Drug use: No        Review of Systems   Constitutional: Negative for chills, fatigue and fever  HENT: Negative for congestion and rhinorrhea  Respiratory: Negative for chest tightness and shortness of breath  Cardiovascular: Negative for chest pain and leg swelling  Gastrointestinal: Positive for abdominal pain  Negative for nausea and vomiting  Musculoskeletal: Positive for back pain and neck pain  Right shoulder, left femur   Skin: Positive for color change ( ecchymosis to the face, right shoulder, left leg )  Negative for wound  Neurological: Positive for headaches  Negative for dizziness, weakness, light-headedness and numbness  Hematological: Bruises/bleeds easily (On aspirin)  Psychiatric/Behavioral: Negative for confusion  Physical Exam  Physical Exam   Constitutional: She is oriented to person, place, and time  She appears well-developed and well-nourished  No distress  HENT:   Head: Normocephalic     Right Ear: External ear normal    Left Ear: External ear normal    Mouth/Throat: Oropharynx is clear and moist    No hemotympanum   Bruising to the face, healing now  Eyes: Pupils are equal, round, and reactive to light  Conjunctivae and EOM are normal  Right eye exhibits no discharge  Left eye exhibits no discharge  Neck: Neck supple  No tracheal deviation present    + midline tenderness, upper cervical spine, no step offs   Cardiovascular: Normal rate, regular rhythm, normal heart sounds and intact distal pulses  Pulmonary/Chest: Effort normal and breath sounds normal  No stridor  She has no wheezes  She exhibits tenderness  CTA-BL   Abdominal: Soft  She exhibits no distension  There is tenderness (Epigastric)  There is no guarding  Stable pelvis   Musculoskeletal: She exhibits no tenderness or deformity  Back is non-tender, no step offs  Decreased range of motion right shoulder, left hip secondary to pain   Neurological: She is alert and oriented to person, place, and time  No cranial nerve deficit or sensory deficit  GCS = 15   Skin: Skin is warm and dry  She is not diaphoretic  No abrasions, no lacerations,   Ecchymosis to the face, right shoulder, left femur  No open wounds are noted  Psychiatric: She has a normal mood and affect   Her behavior is normal        Vital Signs  ED Triage Vitals [09/14/19 2011]   Temperature Pulse Respirations Blood Pressure SpO2   97 9 °F (36 6 °C) 73 18 (!) 87/68 97 %      Temp Source Heart Rate Source Patient Position - Orthostatic VS BP Location FiO2 (%)   Oral Monitor Sitting Left arm --      Pain Score       6           Vitals:    09/14/19 2230 09/14/19 2300 09/15/19 0000 09/15/19 0206   BP: 138/60 141/64 128/64 134/85   Pulse: 73 74 66 70   Patient Position - Orthostatic VS:    Lying         Visual Acuity      ED Medications  Medications   fentanyl citrate (PF) 100 MCG/2ML 50 mcg (50 mcg Intravenous Given 9/14/19 2115)   sodium chloride 0 9 % bolus 1,000 mL (0 mL Intravenous Stopped 9/15/19 0018)   iohexol (OMNIPAQUE) 350 MG/ML injection (MULTI-DOSE) 100 mL (100 mL Intravenous Given 9/14/19 2205)       Diagnostic Studies  Results Reviewed     Procedure Component Value Units Date/Time    Troponin I [[id number]]  (Normal) Collected:  09/15/19 0034    Lab Status:  Final result Specimen:  Blood from Arm, Left Updated:  09/15/19 0057     Troponin I <0 02 ng/mL     Basic metabolic panel [[id number]]  (Abnormal) Collected:  09/14/19 2112    Lab Status:  Final result Specimen:  Blood from Arm, Right Updated:  09/14/19 2134     Sodium 139 mmol/L      Potassium 3 9 mmol/L      Chloride 103 mmol/L      CO2 30 mmol/L      ANION GAP 6 mmol/L      BUN 19 mg/dL      Creatinine 1 38 mg/dL      Glucose 85 mg/dL      Calcium 9 1 mg/dL      eGFR 36 ml/min/1 73sq m     Narrative:       [place] guidelines for Chronic Kidney Disease (CKD):     Stage 1 with normal or high GFR (GFR > 90 mL/min/1 73 square meters)    Stage 2 Mild CKD (GFR = 60-89 mL/min/1 73 square meters)    Stage 3A Moderate CKD (GFR = 45-59 mL/min/1 73 square meters)    Stage 3B Moderate CKD (GFR = 30-44 mL/min/1 73 square meters)    Stage 4 Severe CKD (GFR = 15-29 mL/min/1 73 square meters)    Stage 5 End Stage CKD (GFR <15 mL/min/1 73 square meters)  Note: GFR calculation is accurate only with a steady state creatinine    CBC and differential [[id number]]  (Abnormal) Collected:  09/14/19 2112    Lab Status:  Final result Specimen:  Blood from Arm, Right Updated:  09/14/19 2120     WBC 10 63 Thousand/uL      RBC 5 01 Million/uL      Hemoglobin 13 3 g/dL      Hematocrit 43 8 %      MCV 87 fL      MCH 26 5 pg      MCHC 30 4 g/dL      RDW 15 4 %      MPV 11 5 fL      Platelets 483 Thousands/uL      nRBC 0 /100 WBCs      Neutrophils Relative 59 %      Immat GRANS % 1 %      Lymphocytes Relative 22 %      Monocytes Relative 11 %      Eosinophils Relative 6 %      Basophils Relative 1 %      Neutrophils Absolute 6 37 Thousands/µL      Immature Grans Absolute 0 11 Thousand/uL      Lymphocytes Absolute 2 31 Thousands/µL      Monocytes Absolute 1 14 Thousand/µL      Eosinophils Absolute 0 64 Thousand/µL      Basophils Absolute 0 06 Thousands/µL                  RADIOLOGY RESULTS   Final Result by  (09/17 1508)      CT head without contrast   ED Interpretation by Mohit Lunsford DO (09/15 0023)   No acute intracranial abnormality  Final Result by Moses Soto DO (09/15 0416)   Moderate cerebral atrophy with chronic small vessel ischemic change  No acute intracranial abnormality  Findings are consistent with the preliminary report from [facility] which was provided shortly after completion of the exam                      Workstation performed: [id number]         CT facial bones without contrast   ED Interpretation by Mohit Lunsford DO (09/15 0019)   No acute findings      Final Result by Moses Soto DO (09/15 1853)   No evidence of traumatic facial bone injury  Findings are consistent with the preliminary report from [facility] which was provided shortly after completion of the exam          Workstation performed: [id number]         CT spine cervical without contrast   ED Interpretation by Mohit Lunsford DO (09/15 0024)   Superior end plate irregularity at C3 which may reflect discogenic change or age indeterminate fracture for which correlation is recommended  Final Result by Moses Soto DO (09/15 1168)   1  Degenerative changes cervical spine  No acute cervical spine fracture or traumatic malalignment  2   Slight loss in the axial height of the T3 vertebral body along its superior endplate  Mild compression fracture not excluded        Findings are consistent with the preliminary report from [facility] which was provided shortly after completion of the exam                 Workstation performed: [id number]         CT chest abdomen pelvis w contrast   ED Interpretation by Meri Almonte DO (09/15 0020)   No acute findings      Final Result by Latonya Jacobs DO (09/15 9651)   1  No traumatic solid or visceral organ injury  2   Mild compression fracture superior endplate T3    3   Subcentimeter noncalcified bilateral pulmonary nodules, largest measuring 5 mm right lower lobe  Based on current Fleischner Society 2017 Guidelines on incidental pulmonary nodule, no routine follow-up is needed if the patient is considered low risk    for lung cancer  If the patient is considered high risk for lung cancer, 12 month follow-up non-contrast chest CT is recommended  4   Incidental 11 mm left ovarian cyst   According to current guidelines (J Am Jun Radiol 2013;10:671-681) in this late postmenopausal woman, this should be evaluated by routine pelvic ultrasound  If this lesion is symptomatic, consider further    evaluation with pelvic ultrasound now  The major findings are in agreement with the preliminary report provided by [facility] which was provided shortly after completion of the exam  The additional finding of  11 mm left ovarian cyst, will now be communicated with patient's clinical    team by our radiology liaison  The study was marked in [facility] for immediate notification  Workstation performed: [id number]         CT recon only thoracolumbar   Final Result by Latonya Jacobs DO (09/15 4048)   1  Mild compression fracture superior endplate T3    2   Degenerative changes throughout the thoracolumbar spine  Degenerative changes within the thoracic spine have progressed from the prior study  Findings are consistent with the preliminary report from [facility] which was provided shortly after completion of the exam          Workstation performed: [id number]         XR shoulder 2+ views RIGHT   ED Interpretation by Meri Almonte DO (09/15 0022)   No acute osseous injury dislocation  Final Result by Rosette Preciado MD (09/15 1336)      No acute osseous abnormality  Workstation performed: [id number]         XR femur 2 views LEFT   ED Interpretation by Winston Amador DO (09/15 0022)   No acute osseous injury or dislocation  Final Result by Rosette Preciado MD (09/15 1336)      No acute osseous abnormality  Workstation performed: [id number]                    Procedures  Orthopedic injury treatment  Date/Time: 9/15/2019 1:23 AM  Performed by: Winston Amador DO  Authorized by: Winston Amador DO     Patient Location:  ED  Verbal consent obtained?: Yes    Risks and benefits: Risks, benefits and alternatives were discussed    Consent given by:  Patient  Patient states understanding of procedure being performed: Yes    Relevant documents present and verified: Yes    Radiology Images displayed and confirmed  If images not available, report reviewed: Yes    Patient identity confirmed:  Verbally with patient  Injury location: Neck  Neurovascular status: Neurovascularly intact    Distal perfusion: normal    Neurological function: normal    Range of motion: reduced    Skeletal traction used?: No    Immobilization: Aspen collar  Neurovascular status: Neurovascularly intact    Distal perfusion: normal    Neurological function: normal    Range of motion: normal    Range of motion comment:  Splinted  Patient tolerance:  Patient tolerated the procedure well with no immediate complications           ED Course  ED Course as of Sep 22 1606   Sat Sep 14, 2019   2145 Will scan with contrast because of traumatic injury and concern for intra-abd injury   eGFR: 36   Sun Sep 15, 2019   5976 Delay in care as we were utilizing [facility]  Patient was found to have superior endplate fracture of C3, age indeterminate   + tenderness at this site  Will treat as new fx and transfer down to [facility] for Neurosurgery and trauma eval and tx                Identification of Seniors at Risk Most Recent Value   (ISAR) Identification of Seniors at Risk   Before the illness or injury that brought you to the Emergency, did you need someone to help you on a regular basis? 0 Filed at: 09/14/2019 2014   In the last 24 hours, have you needed more help than usual?  0 Filed at: 09/14/2019 2014   Have you been hospitalized for one or more nights during the past 6 months? 0 Filed at: 09/14/2019 2014   In general, do you see well?  0 Filed at: 09/14/2019 2014   In general, do you have serious problems with your memory?    Do you take more than three different medications every day? 1 Filed at: 09/14/2019 2014   ISAR Score  1 Filed at: 09/14/2019 2014                          MDM  Number of Diagnoses or Management Options  C3 cervical fracture [facility]):   Fall, initial encounter:   Diagnosis management comments: Fall 5 days ago  CT scan head, facial bones, neck, chest abdomen pelvis  Plain films of her injured left femur and right shoulder  Pain control  Basic lab work  Patient is found to have an age indeterminate C3 and plate fracture and patient does have tenderness at this area  She is placed in a collar and transferred down to [facility] for the trauma service and for neurosurgical evaluation  Amount and/or Complexity of Data Reviewed  Clinical lab tests: ordered and reviewed  Tests in the radiology section of CPT®: ordered and reviewed        Disposition  Final diagnoses:   Fall, initial encounter   C3 cervical fracture [facility])     Time reflects when diagnosis was documented in both MDM as applicable and the Disposition within this note     Time User Action Codes Description Comment    9/15/2019 12:39 AM Mamie Denton Hinders Add [G44  MYEA] Fall, initial encounter     9/15/2019 12:39 AM Mamie Denton Add [N31 344K] C3 cervical fracture [facility])       ED Disposition     ED Disposition Condition Date/Time Comment    Transfer to Another Facility-In Network  Sun Sep 15, 2019 12:47 AM Claribel Lao should be transferred out to [facility]          MD Documentation      Most Recent Value   Patient Condition  The patient has been stabilized such that within reasonable medical probability, no material deterioration of the patient condition or the condition of the unborn child(el) is likely to result from the transfer   Reason for Transfer  Level of Care needed not available at this facility   Benefits of Transfer  Specialized equipment and/or services available at the receiving facility (Include comment)________________________ Maryt Schulz, neuro surgery]   Risks of Transfer  Potential for delay in receiving treatment, Potential deterioration of medical condition, Loss of IV, Possible worsening of condition or death during transfer, Increased discomfort during transfer   Accepting Physician  [address]  Name, Елена Davis   Sending MD [place]   Provider Certification  General risk, such as traffic hazards, adverse weather conditions, rough terrain or turbulence, possible failure of equipment (including vehicle or aircraft), or consequences of actions of persons outside the control of the transport personnel, Unanticipated needs of medical equipment and personnel during transport, The possibility of a transport vehicle being unavailable, Risk of worsening condition      RN Documentation      [address] Name, [address]   [facility]      Follow-up Information    None         Discharge Medication List as of 9/15/2019  2:14 AM      CONTINUE these medications which have NOT CHANGED    Details   amLODIPine (NORVASC) 5 mg tablet Take by mouth, Starting Fri 3/21/2014, Historical Med      aspirin 81 MG tablet Take by mouth, Historical Med      citalopram (CeleXA) 20 mg tablet citalopram 20 mg tablet, Historical Med      fluticasone-salmeterol (ADVAIR DISKUS) 250-50 mcg/dose inhaler Advair Diskus 250 mcg-50 mcg/dose powder for inhalation, Historical Med gabapentin (NEURONTIN) 100 mg capsule gabapentin 100 mg capsule, Historical Med      levothyroxine 100 mcg tablet Take by mouth, Starting Fri 3/21/2014, Historical Med      lisinopril (ZESTRIL) 20 mg tablet Take by mouth, Starting Fri 12/20/2013, Historical Med      nystatin (MYCOSTATIN) ointment Nystatin [id number] UNIT/GM External Ointment  APPLY [facility]CH  PRN   Refills: 0    Active, Historical Med      omeprazole (PriLOSEC) 20 mg delayed release capsule Take 20 mg by mouth 2 (two) times a day, Historical Med           No discharge procedures on file      ED Provider  Electronically Signed by           Kareem Beebe, DO  09/15/19 Messi 28, DO  09/22/19 5046

## 2019-09-16 LAB
HCT VFR BLD AUTO: 43.6 % (ref 34.8–46.1)
HGB BLD-MCNC: 13.1 G/DL (ref 11.5–15.4)

## 2019-09-16 PROCEDURE — 85018 HEMOGLOBIN: CPT | Performed by: NURSE PRACTITIONER

## 2019-09-16 PROCEDURE — 97163 PT EVAL HIGH COMPLEX 45 MIN: CPT

## 2019-09-16 PROCEDURE — 99205 OFFICE O/P NEW HI 60 MIN: CPT | Performed by: INTERNAL MEDICINE

## 2019-09-16 PROCEDURE — G8978 MOBILITY CURRENT STATUS: HCPCS

## 2019-09-16 PROCEDURE — G8989 SELF CARE D/C STATUS: HCPCS

## 2019-09-16 PROCEDURE — NC001 PR NO CHARGE: Performed by: SURGERY

## 2019-09-16 PROCEDURE — G8979 MOBILITY GOAL STATUS: HCPCS

## 2019-09-16 PROCEDURE — G8987 SELF CARE CURRENT STATUS: HCPCS

## 2019-09-16 PROCEDURE — 85014 HEMATOCRIT: CPT | Performed by: NURSE PRACTITIONER

## 2019-09-16 PROCEDURE — 99225 PR SBSQ OBSERVATION CARE/DAY 25 MINUTES: CPT | Performed by: SURGERY

## 2019-09-16 PROCEDURE — 97166 OT EVAL MOD COMPLEX 45 MIN: CPT

## 2019-09-16 PROCEDURE — G8988 SELF CARE GOAL STATUS: HCPCS

## 2019-09-16 RX ORDER — ACETAMINOPHEN 325 MG/1
975 TABLET ORAL EVERY 8 HOURS SCHEDULED
Qty: 30 TABLET | Refills: 0 | Status: SHIPPED | OUTPATIENT
Start: 2019-09-16

## 2019-09-16 RX ORDER — METHOCARBAMOL 500 MG/1
250 TABLET, FILM COATED ORAL EVERY 6 HOURS PRN
Qty: 20 TABLET | Refills: 0 | Status: SHIPPED | OUTPATIENT
Start: 2019-09-16 | End: 2019-09-30 | Stop reason: ALTCHOICE

## 2019-09-16 RX ORDER — OXYCODONE HYDROCHLORIDE 5 MG/1
2.5 TABLET ORAL EVERY 4 HOURS PRN
Qty: 30 TABLET | Refills: 0 | Status: SHIPPED | OUTPATIENT
Start: 2019-09-16 | End: 2019-09-16

## 2019-09-16 RX ORDER — METHOCARBAMOL 500 MG/1
250 TABLET, FILM COATED ORAL EVERY 6 HOURS PRN
Qty: 20 TABLET | Refills: 0 | Status: SHIPPED | OUTPATIENT
Start: 2019-09-16 | End: 2019-09-16

## 2019-09-16 RX ORDER — OXYCODONE HYDROCHLORIDE 5 MG/1
2.5 TABLET ORAL EVERY 4 HOURS PRN
Qty: 30 TABLET | Refills: 0 | Status: SHIPPED | OUTPATIENT
Start: 2019-09-16 | End: 2019-09-26

## 2019-09-16 RX ADMIN — HEPARIN SODIUM 5000 UNITS: 5000 INJECTION INTRAVENOUS; SUBCUTANEOUS at 05:14

## 2019-09-16 RX ADMIN — ACETAMINOPHEN 975 MG: 325 TABLET ORAL at 21:53

## 2019-09-16 RX ADMIN — HEPARIN SODIUM 5000 UNITS: 5000 INJECTION INTRAVENOUS; SUBCUTANEOUS at 21:53

## 2019-09-16 RX ADMIN — AMLODIPINE BESYLATE 5 MG: 5 TABLET ORAL at 07:38

## 2019-09-16 RX ADMIN — CITALOPRAM HYDROBROMIDE 20 MG: 20 TABLET ORAL at 07:38

## 2019-09-16 RX ADMIN — PANTOPRAZOLE SODIUM 20 MG: 20 TABLET, DELAYED RELEASE ORAL at 06:21

## 2019-09-16 RX ADMIN — GABAPENTIN 100 MG: 100 CAPSULE ORAL at 21:53

## 2019-09-16 RX ADMIN — ASPIRIN 81 MG 81 MG: 81 TABLET ORAL at 07:38

## 2019-09-16 RX ADMIN — ACETAMINOPHEN 975 MG: 325 TABLET ORAL at 13:50

## 2019-09-16 RX ADMIN — PANTOPRAZOLE SODIUM 20 MG: 20 TABLET, DELAYED RELEASE ORAL at 17:55

## 2019-09-16 RX ADMIN — HEPARIN SODIUM 5000 UNITS: 5000 INJECTION INTRAVENOUS; SUBCUTANEOUS at 13:50

## 2019-09-16 RX ADMIN — ACETAMINOPHEN 975 MG: 325 TABLET ORAL at 05:14

## 2019-09-16 RX ADMIN — LEVOTHYROXINE SODIUM 100 MCG: 100 TABLET ORAL at 05:14

## 2019-09-16 NOTE — DISCHARGE SUMMARY
Discharge Summary - Ramiro Murcia 82 y o  female MRN: [id number]    [facility] Encounter: [id number]    Admission Date:   Admission Orders (From admission, onward)     Ordered        09/15/19 0328  Place in Observation  Once                     Admitting Diagnosis: C3 cervical fracture ([facility] [address] ) [S12 200A]  Unspecified multiple injuries, initial encounter [T07  XXXA]    HPI: per resident,  Cate Gillespie:  "Ramiro Murcia is a 82 y o  female with history of chronic aspirin use who presents with a fall from standing 5 days ago  She states that she woke up to go to the bathroom, and slipped out of bed, striking her head on the corner of the nightstand, and striking her body on the floor  The patient had facial pain, swelling, neck pain, and right arm pain, so she eventually went to the emergency department for evaluation  The patient had extensive workup, which revealed a possible C3 fracture  She was noted to be quite tender on exam, so she was placed aspirin collar, and transferred to [facility] for further evaluation, trauma evaluation, and evaluation by Neurosurgery  The patient states that her pain is currently controlled  She denies any numbness, tingling, or weakness of the extremities   "    Procedures Performed: No orders of the defined types were placed in this encounter  Summary of Hospital Course: 81 y/o female admitted with thoracic fracture after a fall at home 5 days ago  no neuro deficits but did have pain  Seen by Neurosurgery who     Significant Findings, Care, Treatment and Services Provided: Xr Spine Thoracic 3 Vw    Result Date: 9/15/2019  Impression: Unchanged mild superior endplate compression fracture of T3   Workstation performed: [id number]       Complications: none    Discharge Diagnosis: S/P Fall  Closed thoracic vertebrae fracture  Pain    Resolved Problems  Date Reviewed: 9/16/2019    None          Condition at Discharge: stable         Discharge instructions/Information to patient and family:   See after visit summary for information provided to patient and family  Provisions for Follow-Up Care:  See after visit summary for information related to follow-up care and any pertinent home health orders  PCP: Iraida Hwang MD    Disposition: Home    Planned Readmission: No      Discharge Statement   I spent 23 minutes discharging the patient  This time was spent on the day of discharge  I had direct contact with the patient on the day of discharge  Additional documentation is required if more than 30 minutes were spent on discharge  Discharge Medications:  See after visit summary for reconciled discharge medications provided to patient and family

## 2019-09-16 NOTE — SOCIAL WORK
CM placed referral for walker and shower seat, as was recommended by therapy  Pt can d/c home from their perspective

## 2019-09-16 NOTE — PROGRESS NOTES
Progress Note - Belem Pradhan 1935, 82 y o  female MRN: [id number]    Unit/Bed#: [facility] 603-01 Encounter: [id number]    Primary Care Provider: Sia Ramos MD   Date and time admitted to hospital: 9/15/2019  2:54 AM        Possible Closed C3 fracture ([address] )  Assessment & Plan  CT C-spine final read is no traumatic fracture  Patient has muscle soreness around C6 - T1  No parathesias, FROM of extremities, equal hand grasps  Ambulating with a cane    Pulmonary nodule  Assessment & Plan  Incidental finding to be followed up with her PCP    * Closed compression fracture of thoracic vertebra [facility])  Assessment & Plan  Consult to neurosurgery, patient seen  TLSO brace  Upright x-rays ordered  DVT prophylaxis  Pain management  Therapy  OOB with assistance            Bedside nurse rounds completed with nurse Darrick Christianson    Prophylaxis: Sequential compression device Brandy Eis)     Disposition: home    Code status:  Level 1 - Full Code    Consultants: Neurosurgery, Therapy    Is the patient 72 years or older?: YES:    1  Before the illness or injury that brought you to the Emergency, did you need someone to help you on a regular basis? 0=No   2  Since the illness or injury that brought you to the Emergency, have you needed more help than usual to take care of yourself? 0=No   3  Have you been hospitalized for one or more nights during the past 6 months (excluding a stay in the Emergency Department)? 0=No   4  In general, do you see well? 0=Yes   5  In general, do you have serious problems with your memory? 0=No   6  Do you take more than three different medications everyday?  1=Yes   TOTAL   1     Did you order a geriatric consult if the score was 2 or greater?: no    SUBJECTIVE:     Transfer from: home  Outside Films Received: no  Tertiary Exam Due on: 9/16/19    Mechanism of Injury:Fall    Details related to Injury: +LOC:  unknown    Chief Complaint: lower neck and upper thoracic pain    HPI/Last 24 hour events: admitted to trauma, Seen by Neurosurgery and placed in a BackPack brace  Awaiting upright films      Active medications:           Current Facility-Administered Medications:     acetaminophen (TYLENOL) tablet 975 mg, 975 mg, Oral, Q8H [address], 975 mg at 09/16/19 0514    amLODIPine (NORVASC) tablet 5 mg, 5 mg, Oral, Daily, 5 mg at 09/16/19 0738    aspirin chewable tablet 81 mg, 81 mg, Oral, Daily, 81 mg at 09/16/19 0738    citalopram (CeleXA) tablet 20 mg, 20 mg, Oral, Daily, 20 mg at 09/16/19 0738    docusate sodium (COLACE) capsule 100 mg, 100 mg, Oral, BID, 100 mg at 09/15/19 1718    fluticasone-vilanterol (BREO ELLIPTA) 100-25 mcg/inh inhaler 1 puff, 1 puff, Inhalation, Daily    gabapentin (NEURONTIN) capsule 100 mg, 100 mg, Oral, HS, 100 mg at 09/15/19 2117    heparin (porcine) subcutaneous injection 5,000 Units, 5,000 Units, Subcutaneous, Q8H [address], 5,000 Units at 09/16/19 0514 **AND** Platelet count, , , Once    HYDROmorphone (DILAUDID) injection 0 2 mg, 0 2 mg, Intravenous, Q4H PRN    levothyroxine tablet 100 mcg, 100 mcg, Oral, Early Morning, 100 mcg at 09/16/19 0514    methocarbamol (ROBAXIN) tablet 250 mg, 250 mg, Oral, Q6H PRN    multi-electrolyte (ISOLYTE-S PH 7 4 equivalent) IV solution, 50 mL/hr, Intravenous, Continuous, Stopped at 09/15/19 1052    naloxone (NARCAN) 0 04 mg/mL syringe 0 04 mg, 0 04 mg, Intravenous, Q1MIN PRN    ondansetron (ZOFRAN) injection 4 mg, 4 mg, Intravenous, Q6H PRN    oxyCODONE (ROXICODONE) IR tablet 2 5 mg, 2 5 mg, Oral, Q4H PRN, 2 5 mg at 09/15/19 2025    oxyCODONE (ROXICODONE) IR tablet 5 mg, 5 mg, Oral, Q4H PRN    pantoprazole (PROTONIX) EC tablet 20 mg, 20 mg, Oral, BID AC, 20 mg at 09/16/19 0621      OBJECTIVE:     Vitals:   Vitals:    09/16/19 0717   BP: 166/91   Pulse: 74   Resp: 18   Temp: 98 7 °F (37 1 °C)   SpO2: 99%       Physical Exam:   GENERAL APPEARANCE: OOB in a chair and appears comfortable  NEURO: intact, GCS - 15  HEENT: EOm's intact  CV: RRR< no complaint of chest pain  LUNGS: CTA bilaterally, no shortness of breath  GI: tolerating a diet  : voiding  MSK: moves all four extremities  SKIN: warm and dry      I/O:   I/O       09/14 0701 - 09/15 0700 09/15 0701 - 09/16 0700 09/16 0701 - 09/17 0700    P  O   592     I V   239 2     Total Intake  831 2     Net  +831 2            Unmeasured Urine Occurrence  3 x 1 x          Invasive Devices: Invasive Devices     Peripheral Intravenous Line            Peripheral IV 09/14/19 Right Antecubital 1 day                  Imaging:   Xr Spine Thoracic 3 Vw    Result Date: 9/15/2019  Impression: Unchanged mild superior endplate compression fracture of T3   Workstation performed: [id number]       Labs: check H/H

## 2019-09-16 NOTE — PHYSICAL THERAPY NOTE
PHYSICAL THERAPY EVALUATION  NAME:  Merdis Heimlich  DATE: 09/16/19    AGE:   82 y o  Mrn:   [id number]  ADMIT DX:  C3 cervical fracture ([facility] [address] ) [S12 200A]  Unspecified multiple injuries, initial encounter [T07  XXXA]    Past Medical History:   Diagnosis Date    Angina pectoris ([facility] [address] )     Arthritis     Asthma     [facility]     Deaf, right     Disease of thyroid gland     hypothyroidism    GERD (gastroesophageal reflux disease)     Glaucoma     Hiatal hernia     Hyperlipidemia     Hypertension     Renal disorder        Past Surgical History:   Procedure Laterality Date    ABDOMINAL SURGERY      exploratory surgery    APPENDECTOMY      BREAST SURGERY      Bilateral biopsys, benign    COLONOSCOPY N/A 8/14/2018    Procedure: COLONOSCOPY;  Surgeon: Jax Lemus MD;  Location: [facility]; Service: Gastroenterology    EAR SURGERY      EYE SURGERY      cataracts     KNEE ARTHROSCOPY Left     shoulder    NOSE SURGERY      rhinoplasty    TONSILLECTOMY         Length Of Stay: 0    PHYSICAL THERAPY EVALUATION:        09/16/19 1058   Note Type   Note type Eval only   Pain Assessment   Pain Assessment No/denies pain   Home Living   Type of [address] One level;Stairs to enter with rails  ([address] )   Home Equipment Cane  (pt reports the use PTA )   Additional Comments Pt reports living with son who is able to assist pt if needed   Prior Function   Level of [place] Independent with ADLs and functional mobility   Lives With Son   Receives Help From Family;Friend(s)   ADL Assistance Independent   Falls in the last 6 months 1 to 4   Vocational Retired   Comments Pt reports the use of a SPC for ambulation PTA    Restrictions/Precautions   Weight Bearing Precautions Per Order No   Braces or Orthoses TLSO  (backpack TLSO, no c/s collar req per Matty Macedo PA-C from [facility])   Other Precautions Chair Alarm;Multiple lines; Fall Risk;Pain;Spinal precautions;Hard of hearing  (chair alarm on post session )   General   Additional Pertinent History Lori SEARS from [facility] states pt refusing c/s collar and is okay to mobilize without collar    Family/Caregiver Present No   Cognition   Overall Cognitive Status WFL   Arousal/Participation Alert   Orientation Level Oriented to person;Oriented to place;Oriented to time   Memory Within functional limits   Following Commands Follows one step commands without difficulty   RUE Assessment   RUE Assessment WFL   LUE Assessment   LUE Assessment WFL   RLE Assessment   RLE Assessment WFL   Strength RLE   RLE Overall Strength 4/5   LLE Assessment   LLE Assessment WFL   Strength LLE   LLE Overall Strength 4/5   Bed Mobility   Supine to Sit 5  Supervision   Additional items Increased time required   Transfers   Sit to Stand 5  Supervision   Additional items Increased time required   Stand to Sit 5  Supervision   Additional items Increased time required   Additional Comments VC and TC needed for hand placement and safety    Ambulation/Elevation   Gait pattern Short stride; Foward flexed   Gait Assistance 5  Supervision   Assistive Device Rolling walker   Distance 75ft x 2    Stair Management Assistance 5  Supervision   Stair Management Technique One rail R   Number of Stairs 4   Balance   Static Sitting Fair +   Static Standing Fair   Ambulatory Fair -   Endurance Deficit   Endurance Deficit No   Activity Tolerance   Activity Tolerance Patient tolerated treatment well   Nurse Made Aware Pt appropriate to be seen and mobilize per ns    Assessment   Prognosis Good   Problem List Decreased strength;Decreased endurance; Impaired balance;Decreased mobility;Pain;Orthopedic restrictions   Assessment Pt is 82 y o  female seen for PT evaluation s/p admit to [facility] on 9/15/2019  Two pt identifiers were used to confirm  Pt presented s/p fall at home    Pt was admitted with a primary dx of: closed compression fracture of T3, possible closed C3 fracture  PT now consulted for assessment of mobility and d/c needs  Pt with Up in chair orders  Pts current co morbidities effecting treatment include: angina pectoris, asthma, COPD, HTN, HLD, renal disorder, and personal factors including JULIETH home  Pts current clinical presentation is Unstable/ Unpredictable (high complexity) due to Ongoing medical management for primary dx, Increased reliance on more restrictive AD compared to baseline, Decreased activity tolerance compared to baseline, Fall risk, Increased assistance needed from caregiver at current time, Spinal precautions at current time, Continuous pulse oximetry monitoring     Prior to admission, pt was I with ambulation with the use of a C/S collar  Upon evaluation, pt currently is requiring S for bed mobility; S for transfers and S for ambulation w/ RW   Pt denies any lightheadedness or dizziness with ambulation  Pt presents at PT eval functioning below baseline and currently w/ overall mobility deficits 2* to: BLE weakness, impaired balance, decreased endurance, gait deviations, pain, decreased activity tolerance compared to baseline, fall risk, orthopedic restrictions  Pt currently at a fall risk 2* to impairments listed above  Based on the aforementioned PT evaluation, pt will continue to benefit from skilled Acute PT interventions to address stated impairments; to maximize functional mobility; for ongoing pt/ family training; and DME needs  At conclusion of PT session pt returned BTB with phone and call bell within reach  Pt denies any further questions at this time  PT is currently recommending home with family support  Pt/ family agreeable to plan and goals as stated on evaluation  PT will continue to follow during hospital stay     Barriers to Discharge None   Barriers to Discharge Comments Pt denies any mobility or safety concerns at d/c    Goals   Patient Goals " to go home"   STG Expiration Date 09/26/19   Short Term Goal #1 In 10 days pt will complete: 1) Bed mobility skills with mod I to increase safety and independence as well as decrease caregiver burden  2) Functional transfers with mod I to promote increased independence, safety, and QOL in the home environment  3) Ambulate 200' using least restrictive AD with mod I without LOB and stable vitals so that pt can negotiate home environment safely and promote independence with functional mobility and return to PLOF  4) Stair training up/ down 7 step/s using rail/s with mod I so that pt can enter/negotiate home environment safely and decrease fall risk  5) Improve balance grades to Good to increase safety with all mobility and decrease fall risk  6) Improve BLE strength by 1/2 grade to help increase overall functional mobility and decrease fall risk  7) PT for ongoing pt and family education; DME needs and D/C planning to promote highest level of function in least restrictive environment  Plan   Treatment/Interventions Functional transfer training;LE strengthening/ROM; Elevations; Endurance training; Therapeutic exercise;Patient/family training;Equipment eval/education; Bed mobility;Gait training;Spoke to nursing;OT;Spoke to case management   PT Frequency Other (Comment)  (3-6x a week )   Recommendation   Recommendation Home with family support  (increased support from family )   Equipment Recommended Walker  (RW)   PT - OK to Discharge Yes  (when medically cleared )   Modified [place] Scale   Modified [place] Scale 3   Barthel Index   Feeding 10   Bathing 0   Grooming Score 5   Dressing Score 5   Bladder Score 10   Bowels Score 10   Toilet Use Score 5   Transfers (Bed/Chair) Score 10   Mobility (Level Surface) Score 10   Stairs Score 5   Barthel Index Score 70   Jennifer Teran, PT

## 2019-09-16 NOTE — PROGRESS NOTES
Pastoral Care Progress Note    2019  Patient: Ramiro Murcia : 1935  Admission Date & Time: 9/15/2019 0254  MRN: [id number] [facility]: [id number]                     Chaplaincy Interventions Utilized:   Empowerment: Encouraged focus on present  Discussed family, family traditions/    Exploration: Explored hope  Pt hopeful to leave hospital soon in order to be with family      Relationship Building: Cultivated a relationship of care and support and Listened empathically          Chaplaincy Outcomes Achieved:  Expressed gratitude, Expressed intermediate hope and Expressed peace  Pt expressed gratitude for  visit       19 1300   Spiritual Beliefs/Perceptions   Support Systems Children;Family members   Coping Responses   Patient Coping Accepting;Open/discussion;Non-verbal  (Smiling; peaceful, hopeful on going home)

## 2019-09-16 NOTE — ASSESSMENT & PLAN NOTE
Consult to neurosurgery, patient seen  TLSO brace  Upright x-rays ordered  DVT prophylaxis  Pain management  Therapy  OOB with assistance

## 2019-09-16 NOTE — DISCHARGE INSTRUCTIONS
Neurosurgery discharge instructions following spine fracture:      TLSO brace to be worn when out of bed of head of bed greater than 45 degrees  May place brace on while sitting on edge of bed  May be removed for showering   No bending, twisting or heavy lifting  No strenuous activities  No Driving  **Please notify MD immediately if you have increased back or leg pain  New numbness, tingling and/or weakness in your legs  **      Geriatrics Discharge Instructions:  Consider blister packs to make medication dosing more convenient:        Call for details: [phone]

## 2019-09-16 NOTE — CONSULTS
Consultation - Geriatric Medicine   Belem Pradhan 82 y o  female MRN: [id number]  [facility] Encounter: [id number]      Assessment/Plan        T3 compression fracture  -Neurosurgery following, recommend TLSO brace  -PT/OT pending  -patient will obtain shower chair for home, has walker  - patient reports history of osteoporosis, though this current mechanism is not a fragility fracture   *last DXA scan demonstrated T score -2 2 in femoral neck, -1 5 in total hip, and 0 0 in lumbar spine, consistent with osteopenia   *pt may benefit from repeat scan, pharmacologic treatment     Acute pain due to trauma  -continue pain control per Geriatric pain protocol: Tylenol 975mg Q8H scheduled, Roxicodone 2 5mg Q4H PRN, Roxicodone 5mg Q4H PRN, and dilaudid 0 2mg Q4H PRN   *noted only one use of oxycodone 2 5 thus far, pt reports pain is primarily positional  -continue adjuncts such as Gabapentin 100mg HS (home medication is 100 BID and 200mg at HS) and lidocaine patch topically  -encourage addition of non-pharmacologic pain treatment including ice and frequent repositioning  -continue bowel regimen including Colace to prevent and treat constipation due to increased risk with acute pain and opiate pain medications      Ambulatory Dysfunction with fall  -multifactorial, though primarily mechanical, including acute and chronic medical conditions such as vision impairment  -encourage use of assistant devices as directed by PT/OT  -encourage adequate lighting and assistance when out of bed  -encourage reduction of tethers to bed/IV to reduce trip hazards: consider hep lock IV when not actively infusing, ensure call bell is within reach  -encourage appropriate body mechanics and monitor for hypotension/orthostatic hypotension  -recommend appropriate footwear at all times   -PT/OT and early frequent mobilization    Deconditioning/Debility  -multifactorial including recent fall  - patient ambulates with cane at baseline, now has walker as well  - patient requests home PT as she reports it is prohibitively difficult for her to get on and off a bus, she has not driven in over 10 years, and her family members work so would not be able to drive her    Frailty  - class 3-4  -multifactorial including chronic knee pain, deconditioning  -Albumin: Invalid input(s): LABALBU     Impaired Hearing  -Encourage use of hearing aids at all appropriate times   - patient states that her hearing aid has run out of battery and the  is at home - if she remains admitted, have family bring   - patient's left ear is good ear, can hear well within normal conversational proximity and speaking more on her left side  -encourage providers and caregivers to speak slowly and clearly directly to patient  -minimize background noise to encourage patient engagement  -consider use of hearing amplifier to reduce risk of straining to hear if hearing aids are not present or are not sufficient     Impaired Vision  -recommend use of corrective lenses at all appropriate times  -encourage adequate lighting and encourage use of assistance with ambulation  -keep personal belongings close to person to avoid reaching    Delirium precautions  -Patient is high risk of delirium due to unfamiliar environment, pain  -Initiate delirium precautions  -maintain normal sleep/wake cycle  -minimize overnight interruptions, group overnight vitals/labs/nursing checks as possible  -dim lights, close blinds and turn off tv to minimize stimulation and encourage sleep environment in evenings  -ensure that pain is well controlled; consider Tylenol 975mg Q8H scheduled if not already ordered   -monitor for fecal and urinary retention which may precipitate delirium  -encourage early mobilization and ambulation  -provide frequent reorientation and redirection  -if reorientation is precipitating agitation and patient is not in harm of harming self or others please redirect and defer reorientation to prevent contributing to agitation/need for physical/chemical restraints  -encourage family and friends at the bedside to help help calm patient if anxious  -avoid medications which may precipitate or worsen delirium such as tramadol, benzodiazepine, anticholinergics, and benadryl  -encourage hydration and nutrition   -redirect unwanted behaviors as first line, avoid physical restraints, use chemical restraint only if all other attempts have been unsuccessful, would consider Zyprexa 2 5mg IM Q, monitor for orthostatic hypotension and QTc prolongation with repeat dosing, recommend lowest dose possible for shortest duration possible            History of Present Illness   Physician Requesting Consult: Madina Sarmiento DO  Reason for Consult / Principal Problem: Fall, T3 fracture  Hx and PE limited by: none  Additional history obtained from: chart review      HPI: Tamra Baker is a 82y o  year old female with history of HTN, COPD, hypothyroidism, [place], who takes aspirin, who presents with back pain after a fall  She was found to have T3 compression fracture and is managed by the trauma service  She was evaluated by the neurosurgical service as well, who have determined that she is not a surgical candidate  She is currently using a TLSO brace  Noted that there is no c-spine fracture; cervical collar still recommended by neurosurgery and trauma, but patient refuses  Currently, the patient states that her pain is well controlled, and only recurs when she turns or moves too quickly  The fall occurred last Monday, the 9th, and patient states that she was "too stubborn" to come in sooner, and that her son brought her when the pain became unbearable on the 14th  According to the patient, this fall occurred when she was getting up to use the bathroom  She braced her hand against the bed while she was getting up to stand, but her hand slipped so she fell between her bed and nightstand   She reports that she became disoriented and confused but was eventually able to get up without help  She states that her last major fall was more than 5 years ago, when she tripped over her dog  The patient states that she lives with her son in a single story home  He works long hours so she largely takes care of her own needs  There are seven steps to the entry way and these are challenging but manageable for her  She does not drive  Her son manages her finances  She cooks and cleans  She reports that she values being independent  She states that she has other family members who live very close by, but that they also work a lot  She has two cats at home  Inpatient consult to Gerontology  Consult performed by: Henok York DO  Consult ordered by: Patti Faust MD          Inpatient consult to Gerontology     Date/Time 9/16/2019 2:14 PM     Performed by  Henok York DO     Authorized by Patti Faust MD              Review of Systems   Constitutional: Negative for chills and fever  HENT: Positive for hearing loss  Respiratory: Negative for shortness of breath  Cardiovascular: Negative for chest pain  Gastrointestinal: Negative for abdominal pain, nausea and vomiting  Genitourinary: Negative for dysuria and enuresis  Musculoskeletal: Positive for back pain  Back pain with movement   Neurological: Positive for headaches  Negative for dizziness and light-headedness  Slight headache   Psychiatric/Behavioral: Negative for sleep disturbance  Geriatric Conditions:   Memory:  Good - MiniCog 4/5  Mobility:  Difficulty with steps at baseline, now worsened due to brace  Falls:  Patient reports last major fall was >5 years ago when she tripped over her dog and broke her hand  Assistive Devices:  Ferdinand Guidry, now walker  Fraility: Class 4  Nutrition/weight loss/grocery shopping/meal preparation: Patient reports that she has frozen meals that she heats, and that she cooks for her family   She does not drive   Vision impairment: Uses glasses  Hearing impairment: Has hearing aid for left ear, right ear "totally gone"  Incontinence: Wears Depends at home  Delirium: No evidence thus far  Polypharmacy: Yes, Patient uses bags to separate AM and PM medications  Patients primary residence:Home Lives with: Son  iADL's:  Son handles finances, patient does not drive herself  ADL's:  Completely independent    Historical Information   Past Medical History:   Diagnosis Date    [facility]is [facility] )     Arthritis     Asthma     [facility]     Deaf, right     Disease of thyroid gland     hypothyroidism    GERD (gastroesophageal reflux disease)     Glaucoma     Hiatal hernia     Hyperlipidemia     Hypertension     Renal disorder      Past Surgical History:   Procedure Laterality Date    ABDOMINAL SURGERY      exploratory surgery    APPENDECTOMY      BREAST SURGERY      Bilateral biopsys, benign    COLONOSCOPY N/A 2018    Procedure: COLONOSCOPY;  Surgeon: Kavya Atkinson MD;  Location: [facility]; Service: Gastroenterology    EAR SURGERY      EYE SURGERY      cataracts     KNEE ARTHROSCOPY Left     shoulder    NOSE SURGERY      rhinoplasty    TONSILLECTOMY       Social History   Social History     Substance and Sexual Activity   Alcohol Use Not Currently     Social History     Substance and Sexual Activity   Drug Use No     Social History     Tobacco Use   Smoking Status Former Smoker    Last attempt to quit: 1990    Years since quittin 1   Smokeless Tobacco Never Used     Family History: Mother had osteoporosis, both parents had cancer    Meds/Allergies   all current active meds have been reviewed    Allergies   Allergen Reactions    Erythromycin Anaphylaxis, Rash and Tremor    Pravastatin Other (See Comments)     Pt  Not sure          Objective     Intake/Output Summary (Last 24 hours) at 2019 1441  Last data filed at 2019 0900  Gross per 24 hour Intake 661 ml   Output    Net 661 ml     Invasive Devices     Peripheral Intravenous Line            Peripheral IV 09/14/19 Right Antecubital 1 day                Physical Exam   Constitutional: She is oriented to person, place, and time  She appears well-developed and well-nourished  HENT:   Head: Normocephalic and atraumatic  Eyes: Conjunctivae are normal  No scleral icterus  Cardiovascular: Normal rate, regular rhythm and normal heart sounds  No murmur heard  Pulmonary/Chest: Effort normal and breath sounds normal  She has no wheezes  She has no rales  Abdominal: Soft  Bowel sounds are normal  She exhibits no distension  There is no tenderness  There is no guarding  Musculoskeletal: She exhibits no edema or deformity  TLSO brace in place   Neurological: She is alert and oriented to person, place, and time  Skin: Skin is warm and dry  No erythema  Psychiatric: She has a normal mood and affect  Her behavior is normal        Lab Results:   I have personally reviewed pertinent lab results including the following:  -BMP, CBC, troponin    I have personally reviewed the following imaging study reports:  -CT recon thoracolumbar: mild compression fracture of T3, progressive degenearative changes of thoracolumbar spine (compared with august 2016)  -CT CAP: lung nodules present, hiatal hernia, diverticulosis, 11mm left ovarian cyst    Personal interpretation of imaging studies mentioned above:    - pt will require f/u pelvic US   - may benefit from repeat DXA scan    Therapies:   PT: Pending  OT: Pending    VTE Prophylaxis: Heparin    Code Status: Level 1 - Full Code  Advance Directive and Living Will:      Power of :    POLST:      Family and Social Support:   Living Arrangements: Children  Support Systems: Children;Family members  Assistance Needed:  To be determined  Type of Current Residence: Private residence  Current Home Care Services: No  Freedom of Choice: Yes      Goals of Care: Ongoing

## 2019-09-16 NOTE — ASSESSMENT & PLAN NOTE
CT C-spine final read is no traumatic fracture  Patient has muscle soreness around C6 - T1  No parathesias, FROM of extremities, equal hand grasps  Ambulating with a cane

## 2019-09-16 NOTE — UTILIZATION REVIEW
Initial Clinical Review    9/15 Transfer from [facility] ED  Pt at [facility] from 9/14 thru 9/15    Admission: Date/Time/Statement: Observation 9/15 @ 0328  Orders Placed This Encounter   Procedures    Place in Observation     Standing Status:   Standing     Number of Occurrences:   1     Order Specific Question:   Admitting Physician     Answer:   Niecy Scruggs [[id number]]     Order Specific Question:   Level of Care     Answer:   Med Surg [16]     ED Arrival Information     Expected Arrival Acuity Means of Arrival Escorted By Service Admission Type    9/15/2019  9/15/2019 02:54 [facility]) Trauma Emergency    Arrival Complaint    Fall, [place] fracture        Chief Complaint   Patient presents with    Trauma     Assessment/Plan:   82 y o  female with history of chronic aspirin use who presents with a fall from standing 5 days ago  She states that she woke up to go to the bathroom, and slipped out of bed, striking her head on the corner of the nightstand, and striking her body on the floor  The patient had facial pain, swelling, neck pain, and right arm pain, so she eventually went to the emergency department for evaluation  The patient had extensive workup, which revealed a possible C3 fracture  She was noted to be quite tender on exam, so she was placed aspirin collar, and transferred to [facility] for further evaluation, trauma evaluation, and evaluation by Neurosurgery  The patient states that her pain is currently controlled  Trauma Active Problems:   Possible C3 superior endplate fracture  Fall out of bed  Right shoulder ecchymosis     Trauma Plan:  -neurosurgery consult  -right shoulder and left femur fractures appear negative for fracture, follow-up official read  -F/U official radiology reads of CT scans  -maintain [place] collar  -neuro checks  -scheduled Tylenol, Robaxin, gabapentin  -p r n   Geriatric dosed narcotics for breakthrough pain  -geriatrics consult  -heparin, SCDs  -NPO pending neurosurgery evaluation  -PT/OT    9/15 Neurosurgery cons;  Closed compression fracture of thoracic vertebra   Assessment & Plan  Imaging personally reviewed  · CT recon only thoracolumbar:  1  Mild compression fractures superior endplate T3   2   Degenerative changes throughout the thoracolumbar spine  · Upright thoracic x-rays ordered and pending     Medical management:  · Discussed with the patient about everything  Technically patient should be in a CT all for best coverage, but she refuses to wear cervical collar at all  Will try TLSO backpack when out of bed and head of bed greater than 45°  · Pain control per primary team  · PT OT evaluation  · DVT prophylaxis:  SCDs, heparin subcutaneous  · Geriatrics consult  · Neurosurgery pending upright x-rays for baseline alignment  If stable will sign off and follow up in 2 weeks with repeat imaging    9/16 Plan got for d/c today but Unable to get a ride so discharge cancelled, will discharge in morning per Trauma progress note      ED Triage Vitals   Temperature Pulse Respirations Blood Pressure SpO2   09/15/19 0309 09/15/19 0309 09/15/19 0309 09/15/19 0309 09/15/19 0309   97 6 °F (36 4 °C) 66 18 143/64 94 %      Temp Source Heart Rate Source Patient Position - Orthostatic VS BP Location FiO2 (%)   09/15/19 0309 09/15/19 0309 09/15/19 0309 09/15/19 0636 --   Oral Monitor Lying Right arm       Pain Score       09/15/19 0600       5        Wt Readings from Last 1 Encounters:   09/14/19 85 7 kg (189 lb)     Additional Vital Signs:   Date/Time  Temp  Pulse  Resp  BP  MAP (mmHg)  SpO2  O2 Device    09/16/19 07:17:38  98 7 °F (37 1 °C)  74  18  166/91  116  99 %  None (Room air)    09/15/19 23:44:42  98 °F (36 7 °C)  74  16  140/76  97  92 %      09/15/19 15:39:30  98 7 °F (37 1 °C)  61  18  141/75  97  95 %  None (Room air)    09/15/19 11:41:35  97 7 °F (36 5 °C)  66  18  134/73            Pertinent Labs/Diagnostic Test Results: CT Head - Moderate cerebral atrophy with chronic small vessel ischemic change  No acute intracranial abnormality  CT Facial Bones - No evidence of traumatic facial bone injury      CT Chest/ Abd/ Pelvis - Mild compression fracture superior endplate T3      Results from last 7 days   Lab Units 09/15/19  0611 09/14/19  2112   WBC Thousand/uL 9 27 10 63*   HEMOGLOBIN g/dL 12 2 13 3   HEMATOCRIT % 39 6 43 8   PLATELETS Thousands/uL 236 271   NEUTROS ABS Thousands/µL  --  6 37         Results from last 7 days   Lab Units 09/15/19  0611 09/14/19  2112   SODIUM mmol/L 142 139   POTASSIUM mmol/L 3 9 3 9   CHLORIDE mmol/L 109* 103   CO2 mmol/L 28 30   ANION GAP mmol/L 5 6   BUN mg/dL 18 19   CREATININE mg/dL 1 30 1 38*   EGFR ml/min/1 73sq m 38 36   CALCIUM mg/dL 8 5 9 1             Results from last 7 days   Lab Units 09/15/19  0611 09/14/19  2112   GLUCOSE RANDOM mg/dL 97 85     Results from last 7 days   Lab Units 09/15/19  0034   TROPONIN I ng/mL <0 02     ED Treatment:   Medication Administration from 09/15/2019 0218 to 09/15/2019 1135       Date/Time Order Dose Route Action     09/15/2019 0605 multi-electrolyte (ISOLYTE-S PH 7 4 equivalent) IV solution 50 mL/hr Intravenous New Bag     09/15/2019 0835 docusate sodium (COLACE) capsule 100 mg 100 mg Oral Given     09/15/2019 0605 heparin (porcine) subcutaneous injection 5,000 Units 5,000 Units Subcutaneous Given     09/15/2019 0600 acetaminophen (TYLENOL) tablet 975 mg 975 mg Oral Given     09/15/2019 0835 amLODIPine (NORVASC) tablet 5 mg 5 mg Oral Given     09/15/2019 9221 aspirin chewable tablet 81 mg 81 mg Oral Given     09/15/2019 0835 citalopram (CeleXA) tablet 20 mg 20 mg Oral Given     09/15/2019 0749 levothyroxine tablet 100 mcg 100 mcg Oral Given     09/15/2019 0749 pantoprazole (PROTONIX) EC tablet 20 mg 20 mg Oral Given        Past Medical History:   Diagnosis Date    Angina pectoris (HCC)     Arthritis     Asthma     [facility]) ([facility] )     Deaf, right     Disease of thyroid gland     hypothyroidism    GERD (gastroesophageal reflux disease)     Glaucoma     Hiatal hernia     Hyperlipidemia     Hypertension     Renal disorder      Present on Admission:  **None**    Admitting Diagnosis: C3 cervical fracture ([facility] ) [S12 200A]  Unspecified multiple injuries, initial encounter [T07  XXXA]     Age/Sex: 82 y o  female     Admission Orders:  Neurological checks q4h  Spine Brace  IP CONSULT TO NEUROSURGERY  IP CONSULT TO GERONTOLOGY    Current Facility-Administered Medications:  acetaminophen 975 mg Oral Q8H [facility]   amLODIPine 5 mg Oral Daily   aspirin 81 mg Oral Daily   citalopram 20 mg Oral Daily   docusate sodium 100 mg Oral BID   fluticasone-vilanterol 1 puff Inhalation Daily   gabapentin 100 mg Oral HS   heparin (porcine) 5,000 Units Subcutaneous Q8H [facility]   HYDROmorphone 0 2 mg Intravenous Q4H PRN   levothyroxine 100 mcg Oral Early Morning   methocarbamol 250 mg Oral Q6H PRN   multi-electrolyte 50 mL/hr Intravenous Continuous   naloxone 0 04 mg Intravenous Q1MIN PRN   ondansetron 4 mg Intravenous Q6H PRN   oxyCODONE 2 5 mg Oral Q4H PRN   oxyCODONE 5 mg Oral Q4H PRN   pantoprazole 20 mg Oral BID      Network Utilization Review Department  Phone: [phone]; Fax [phone]  [email or web address]  ATTENTION: Please call with any questions or concerns to [phone]  and carefully listen to the prompts so that you are directed to the right person  Send all requests for admission clinical reviews, approved or denied determinations and any other requests to fax [phone]   All voicemails are confidential

## 2019-09-16 NOTE — PLAN OF CARE
Problem: PHYSICAL THERAPY ADULT  Goal: Performs mobility at highest level of function for planned discharge setting  See evaluation for individualized goals  Description  Treatment/Interventions: Functional transfer training, LE strengthening/ROM, Elevations, Endurance training, Therapeutic exercise, Patient/family training, Equipment eval/education, Bed mobility, Gait training, Spoke to nursing, OT, Spoke to case management  Equipment Recommended: Walker(RW)       See flowsheet documentation for full assessment, interventions and recommendations  Note:   Prognosis: Good  Problem List: Decreased strength, Decreased endurance, Impaired balance, Decreased mobility, Pain, Orthopedic restrictions  Assessment: Pt is 82 y o  female seen for PT evaluation s/p admit to [facility] on 9/15/2019  Two pt identifiers were used to confirm  Pt presented s/p fall at home  Pt was admitted with a primary dx of: closed compression fracture of T3, possible closed C3 fracture  PT now consulted for assessment of mobility and d/c needs  Pt with Up in chair orders  Pts current co morbidities effecting treatment include: angina pectoris, asthma, COPD, HTN, HLD, renal disorder, and personal factors including JULIETH home  Pts current clinical presentation is Unstable/ Unpredictable (high complexity) due to Ongoing medical management for primary dx, Increased reliance on more restrictive AD compared to baseline, Decreased activity tolerance compared to baseline, Fall risk, Increased assistance needed from caregiver at current time, Spinal precautions at current time, Continuous pulse oximetry monitoring     Prior to admission, pt was I with ambulation with the use of a C/S collar  Upon evaluation, pt currently is requiring S for bed mobility; S for transfers and S for ambulation w/ RW   Pt denies any lightheadedness or dizziness with ambulation   Pt presents at PT eval functioning below baseline and currently w/ overall mobility deficits 2* to: BLE weakness, impaired balance, decreased endurance, gait deviations, pain, decreased activity tolerance compared to baseline, fall risk, orthopedic restrictions  Pt currently at a fall risk 2* to impairments listed above  Based on the aforementioned PT evaluation, pt will continue to benefit from skilled Acute PT interventions to address stated impairments; to maximize functional mobility; for ongoing pt/ family training; and DME needs  At conclusion of PT session pt returned BTB with phone and call bell within reach  Pt denies any further questions at this time  PT is currently recommending home with family support  Pt/ family agreeable to plan and goals as stated on evaluation  PT will continue to follow during hospital stay  Barriers to Discharge: None  Barriers to Discharge Comments: Pt denies any mobility or safety concerns at d/c   Recommendation: Home with family support(increased support from family )     PT - OK to Discharge: Yes(when medically cleared )    See flowsheet documentation for full assessment

## 2019-09-16 NOTE — SOCIAL WORK
Cm met with patient to review role of Cm  Patient presented as alert during conversation  Patient reported that she lives with her son Patrick Green, [phone] or [phone]  Patient reported that he and son live in a ranch style home with 7 steps to enter with rails  Patient reported that there are basement and attic steps, but reports that she does not have to use them  Patient was independent with cane use and independent with other ADLS  Patient denied having any inpatient PT/OT, inpatient MH, substance abuse treatment or [facility]  Patient reported that pharmacy of choice is [facility] on [address] in [facility]  Patient denied having any other DME in the home  Patient's PCP is a private physician who is affiliated with [facility]       Patient would like a RW and shower chair at time of d/c  Cm stated that she would express request to medical team to see if they are willing to order  CM reviewed d/c planning process including the following: identifying help at home, patient preference for d/c planning needs, [facility]charge Lounge, [facility], availability of treatment team to discuss questions or concerns patient and/or family may have regarding understanding medications and recognizing signs and symptoms once discharged  CM also encouraged patient to follow up with all recommended appointments after discharge  Patient advised of importance for patient and family to participate in managing patients medical well being

## 2019-09-16 NOTE — OCCUPATIONAL THERAPY NOTE
[address]  Evaluation     Patient Name: Jaymie ARRIOLA Date: 9/16/2019  Problem List  Principal Problem:    Closed compression fracture of thoracic vertebra [facility])  Active Problems:    Pulmonary nodule    Ovarian cyst    Possible Closed C3 fracture (HCC)    Past Medical History  Past Medical History:   Diagnosis Date    Angina pectoris ([facility] [address] )     Arthritis     Asthma     COPD (chronic obstructive pulmonary disease) ([facility] [address] )     Deaf, right     Disease of thyroid gland     hypothyroidism    GERD (gastroesophageal reflux disease)     Glaucoma     Hiatal hernia     Hyperlipidemia     Hypertension     Renal disorder      Past Surgical History  Past Surgical History:   Procedure Laterality Date    ABDOMINAL SURGERY      exploratory surgery    APPENDECTOMY      BREAST SURGERY      Bilateral biopsys, benign    COLONOSCOPY N/A 8/14/2018    Procedure: COLONOSCOPY;  Surgeon: Evelena Phalen, MD;  Location: [facility]; Service: Gastroenterology    EAR SURGERY      EYE SURGERY      cataracts     KNEE ARTHROSCOPY Left     shoulder    NOSE SURGERY      rhinoplasty    TONSILLECTOMY           09/16/19 1059   Note Type   Note type Eval only   Restrictions/Precautions   Weight Bearing Precautions Per Order No   Braces or Orthoses TLSO  (BACKPACK TLSO, NO C-COLLAR PER LEONIDAS FROM [facility])   Other Precautions Fall Risk;Pain;Spinal precautions;Hard of hearing   Pain Assessment   Pain Assessment 0-10   Pain Score 3   Pain Type Acute pain   Pain Location Back   Hospital Pain Intervention(s) Repositioned; Ambulation/increased activity; Distraction; Emotional support   Response to Interventions TOLERATED    Home Living   Type of [facility] One level;Stairs to enter with rails  ([address] )   Bathroom Shower/Tub Tub/shower unit   Bathroom Toilet Standard   Bathroom Accessibility Accessible   Home Equipment Cane   Additional Comments PT REPORTS USE OF SPC PTA    Prior Function   Level of [place] Independent with ADLs and functional mobility   Lives With Son   Receives Help From Family   ADL Assistance Independent   IADLs Independent   Falls in the last 6 months 1 to 4   Vocational Retired   Lifestyle   Autonomy  [address] ADLS/IADLS/DRIVING PTA   Reciprocal Relationships LIVES WITH SUPPORTIVE SON WHO IS ABLE TO ASSIST UPON D/C  Service to Others RETIRED   Intrinsic Gratification ENJOYS BABYSITTING HER GRANDCHILDREN    Psychosocial   Psychosocial (WDL) WDL   ADL   Eating Assistance 7  Independent   Grooming Assistance 7  Independent   UB Bathing Assistance 5  Supervision/Setup   LB Bathing Assistance 3  Moderate Assistance   UB Dressing Assistance 5  Supervision/Setup   LB Dressing Assistance 3  Moderate Assistance   Toileting Assistance  5  Supervision/Setup   Functional Assistance 5  Supervision/Setup   Bed Mobility   Supine to Sit 5  Supervision   Additional items Assist x 1; Increased time required;Verbal cues;LE management   Sit to Supine 5  Supervision   Additional items Assist x 1; Increased time required;Verbal cues;LE management   Transfers   Sit to Stand 5  Supervision   Additional items Assist x 1; Increased time required;Verbal cues   Stand to Sit 5  Supervision   Additional items Assist x 1; Increased time required;Verbal cues   Functional Mobility   Functional Mobility 5  Supervision   Additional items Rolling walker   Balance   Static Sitting Fair +   Static Standing Fair   Ambulatory Fair -   Activity Tolerance   Activity Tolerance Patient tolerated treatment well   Medical Staff Made Aware SPOKE TO CM REGARDING D/C PLAN    Nurse Made Aware APPROPRIATE TO SEE PER RN   RUE Assessment   RUE Assessment WFL   LUE Assessment   LUE Assessment WFL   Hand Function   Gross Motor Coordination Functional   Fine Motor Coordination Functional   Sensation   Light Touch No apparent deficits   Cognition   Overall Cognitive Status WFL   Arousal/Participation Alert; Cooperative   Attention Within functional limits   Orientation Level Oriented X4   Memory Decreased recall of precautions   Following Commands Follows multistep commands without difficulty   Comments PT IS PLEASANT AND COOPERATIVE  PT ABLE TO RECALL 2/3 SPINAL PRECAUTIONS FOLLOWING EDUCATION- PROVIDED VISUAL REMINDER ON WHITE BOARD  Assessment   Assessment 83 YO Female SEEN FOR INITIAL OCCUPATIONAL THERAPY EVALUATION FOLLOWING ADMISSION TO [facility] S/P FALL RESULTING IN MILD COMPRESSION FX OF SUPERIOR ENDPLATE T3  PER [facility], PT SHOULD TECHNICALLY WEAR /C-COLLAR HOWEVER REFUSING THEREFORE APPROPRIATE FOR OOB ACTIVITY WITH THERAPY WITH BACKPACK TLSO WITH SPINAL PRECAUTIONS  PROBLEMS LIST INCLUDES HTN, HLD, HIATAL HERNIA, GLAUCOMA, HYPOTHYROIDISM, DEAF IN R EAR AND COPD  PT IS FROM HOME WITH SUPPORTIVE SON WHERE HE REPORTS BEING INDEPENDENT WITH ADLS/IADLS/DRIVING PTA  PT CURRENTLY REQUIRES OVERALL SUPERVISION WITH UB ADLS, MOD A WITH LB ADLS, AND SUPERVISION WITH TRANSFERS / FUNCTIONAL MOBILITY WITH USE OF RW  PT IS LIMITED 2' PAIN, FATIGUE, IMPAIRED BALANCE, FALL RISK , SPINAL PRECAUTIONS and OVERALL LIMITED ACTIVITY TOLERANCE  PT EDUCATED ON SPINAL PRECAUTIONS, LOG ROLL TECHNIQUE, MANAGEMENT BACKPACK TLSO, DEEP BREATHING TECHNIQUES T/O ACTIVITY, SLOWING OF PACE, ENERGY CONSERVATION TECHNIQUES FOR CARRY OVER UPON D/C, INCREASED FAMILY SUPPORT and CONTINUE PARTICIPATION IN SELF-CARE/MOBILITY WITH STAFF [address]  PT REPORTS HER SON IS ABLE TO ASSIST WITH LB ADLS AND IADLS UPON D/C  FROM AN OCCUPATIONAL THERAPY PERSPECTIVE, PT CAN RETURN HOME WITH INCREASED FAMILY SUPPORT WHEN MEDICALLY CLEARED  CURRENT DME RECS INCLUDE SC AND AGREE WITH USE OF RW  ALL QUESTIONS/CONCERNS ADDRESSED  NO ADDITIONAL ACUTE CARE OT NEEDS  D/C OT      Goals   Patient Goals TO RETURN HOME    Recommendation   OT Discharge Recommendation Home with family support   Equipment Recommended Tub seat with back  (RW)   OT - OK to Discharge Yes Barthel Index   Feeding 10   Bathing 0   Grooming Score 5   Dressing Score 5   Bladder Score 10   Bowels Score 10   Toilet Use Score 10   Transfers (Bed/Chair) Score 10   Mobility (Level Surface) Score 10   Stairs Score 5   Barthel Index Score 75   Modified Kayli Scale   Modified [place] Scale 3       Documentation completed by Neftaly Schuster, MOT, OTR/L

## 2019-09-16 NOTE — PLAN OF CARE
Problem: PAIN - ADULT  Goal: Verbalizes/displays adequate comfort level or baseline comfort level  Description  Interventions:  - Encourage patient to monitor pain and request assistance  - Assess pain using appropriate pain scale  - Administer analgesics based on type and severity of pain and evaluate response  - Implement non-pharmacological measures as appropriate and evaluate response  - Consider cultural and social influences on pain and pain management  - Notify physician/advanced practitioner if interventions unsuccessful or patient reports new pain  Outcome: Progressing     Problem: INFECTION - ADULT  Goal: Absence or prevention of progression during hospitalization  Description  INTERVENTIONS:  - Assess and monitor for signs and symptoms of infection  - Monitor lab/diagnostic results  - Monitor all insertion sites, i e  indwelling lines, tubes, and drains  - Monitor endotracheal if appropriate and nasal secretions for changes in amount and color  - [place] appropriate cooling/warming therapies per order  - Administer medications as ordered  - Instruct and encourage patient and family to use good hand hygiene technique  - Identify and instruct in appropriate isolation precautions for identified infection/condition  Outcome: Progressing     Problem: SAFETY ADULT  Goal: Patient will remain free of falls  Description  INTERVENTIONS:  - Assess patient frequently for physical needs  -  Identify cognitive and physical deficits and behaviors that affect risk of falls    -  [place] fall precautions as indicated by assessment   - Educate patient/family on patient safety including physical limitations  - Instruct patient to call for assistance with activity based on assessment  - Modify environment to reduce risk of injury  - Consider OT/PT consult to assist with strengthening/mobility  Outcome: Progressing  Goal: Maintain or return to baseline ADL function  Description  INTERVENTIONS:  -  Assess patient's ability to carry out ADLs; assess patient's baseline for ADL function and identify physical deficits which impact ability to perform ADLs (bathing, care of mouth/teeth, toileting, grooming, dressing, etc )  - Assess/evaluate cause of self-care deficits   - Assess range of motion  - Assess patient's mobility; develop plan if impaired  - Assess patient's need for assistive devices and provide as appropriate  - Encourage maximum independence but intervene and supervise when necessary  - Involve family in performance of ADLs  - Assess for home care needs following discharge   - Consider OT consult to assist with ADL evaluation and planning for discharge  - Provide patient education as appropriate  Outcome: Progressing  Goal: Maintain or return mobility status to optimal level  Description  INTERVENTIONS:  - Assess patient's baseline mobility status (ambulation, transfers, stairs, etc )    - Identify cognitive and physical deficits and behaviors that affect mobility  - Identify mobility aids required to assist with transfers and/or ambulation (gait belt, sit-to-stand, lift, walker, cane, etc )  - [place] fall precautions as indicated by assessment  - Record patient progress and toleration of activity level on Mobility SBAR; progress patient to next Phase/Stage  - Instruct patient to call for assistance with activity based on assessment  - Consider rehabilitation consult to assist with strengthening/weightbearing, etc   Outcome: Progressing     Problem: DISCHARGE PLANNING  Goal: Discharge to home or other facility with appropriate resources  Description  INTERVENTIONS:  - Identify barriers to discharge w/patient and caregiver  - Arrange for needed discharge resources and transportation as appropriate  - Identify discharge learning needs (meds, wound care, etc )  - Arrange for interpretive services to assist at discharge as needed  - Refer to Case Management Department for coordinating discharge planning if the patient needs post-hospital services based on physician/advanced practitioner order or complex needs related to functional status, cognitive ability, or social support system  Outcome: Progressing     Problem: Knowledge Deficit  Goal: Patient/family/caregiver demonstrates understanding of disease process, treatment plan, medications, and discharge instructions  Description  Complete learning assessment and assess knowledge base    Interventions:  - Provide teaching at level of understanding  - Provide teaching via preferred learning methods  Outcome: Progressing     Problem: Prexisting or High Potential for Compromised Skin Integrity  Goal: Skin integrity is maintained or improved  Description  INTERVENTIONS:  - Identify patients at risk for skin breakdown  - Assess and monitor skin integrity  - Assess and monitor nutrition and hydration status  - Monitor labs   - Assess for incontinence   - Turn and reposition patient  - Assist with mobility/ambulation  - Relieve pressure over bony prominences  - Avoid friction and shearing  - Provide appropriate hygiene as needed including keeping skin clean and dry  - Evaluate need for skin moisturizer/barrier cream  - Collaborate with interdisciplinary team   - Patient/family teaching  - Consider wound care consult   Outcome: Progressing

## 2019-09-16 NOTE — PROGRESS NOTES
09/16/19 1300   Clinical Encounter Type   Visited With Patient   Routine Visit Introduction   Patient Spiritual Encounters   Spiritual Assessment 4  (Pt is disconnected from [place] community, but still practices)   Coping 4  (Relies upon daughter and two sons)

## 2019-09-17 ENCOUNTER — TELEPHONE (OUTPATIENT)
Dept: NEUROSURGERY | Facility: CLINIC | Age: 84
End: 2019-09-17

## 2019-09-17 VITALS
RESPIRATION RATE: 16 BRPM | DIASTOLIC BLOOD PRESSURE: 79 MMHG | TEMPERATURE: 98.1 F | HEART RATE: 67 BPM | OXYGEN SATURATION: 96 % | SYSTOLIC BLOOD PRESSURE: 137 MMHG

## 2019-09-17 PROCEDURE — 99217 PR OBSERVATION CARE DISCHARGE MANAGEMENT: CPT | Performed by: PHYSICIAN ASSISTANT

## 2019-09-17 RX ADMIN — HEPARIN SODIUM 5000 UNITS: 5000 INJECTION INTRAVENOUS; SUBCUTANEOUS at 05:12

## 2019-09-17 RX ADMIN — AMLODIPINE BESYLATE 5 MG: 5 TABLET ORAL at 09:27

## 2019-09-17 RX ADMIN — LEVOTHYROXINE SODIUM 100 MCG: 100 TABLET ORAL at 05:12

## 2019-09-17 RX ADMIN — ACETAMINOPHEN 975 MG: 325 TABLET ORAL at 05:12

## 2019-09-17 RX ADMIN — DOCUSATE SODIUM 100 MG: 100 CAPSULE, LIQUID FILLED ORAL at 09:28

## 2019-09-17 RX ADMIN — CITALOPRAM HYDROBROMIDE 20 MG: 20 TABLET ORAL at 09:28

## 2019-09-17 RX ADMIN — ASPIRIN 81 MG 81 MG: 81 TABLET ORAL at 09:28

## 2019-09-17 RX ADMIN — PANTOPRAZOLE SODIUM 20 MG: 20 TABLET, DELAYED RELEASE ORAL at 06:18

## 2019-09-17 NOTE — DISCHARGE SUMMARY
Discharge- Jus Rockwell 1935, 82 y o  female MRN: [id number]    [facility] Encounter: [id number]    Primary Care Provider: John Pichardo MD   Date and time admitted to hospital: 9/15/2019  2:54 AM        Possible Closed C3 fracture ([facility] [address] )  Assessment & Plan  CT C-spine final read is no traumatic fracture  Patient has muscle soreness around C6 - T1  No parathesias, FROM of extremities, equal hand grasps      Pulmonary nodule  Assessment & Plan  Incidental finding to be followed up with her PCP    * Closed compression fracture of thoracic vertebra (HCC)  Assessment & Plan  - TLSO brace  - d/c today          Constitution: patient lying in bed, appears comfortable  HEENT: normocephalic, atraumatic, 3 mm ASHVIN, clear speech  CV: regular rate and rhythm, +2 DP pulses  Pulm: CTA, no wheezes, rhonchi or crackles, unlabored, equal bilaterally  Abd: soft, nontender, nondistended, active bowel sounds  Extremities: moves all extremities, equal strength, no edema, no skin breakdown  Neuro: A&O, no focal deficits  Skin: no rash or breakdown, warm            Resolved Problems  Date Reviewed: 9/17/2019    None          Admission Date:   Admission Orders (From admission, onward)     Ordered        09/15/19 0328  Place in Observation  Once                     Admitting Diagnosis: C3 cervical fracture ([facility] [address] ) [S12 200A]  Unspecified multiple injuries, initial encounter [T07  XXXA]    HPI: Per resident Junaid Gay MD, "Jus Rockwell is a 82 y o  female with history of chronic aspirin use who presents with a fall from standing 5 days ago  She states that she woke up to go to the bathroom, and slipped out of bed, striking her head on the corner of the nightstand, and striking her body on the floor  The patient had facial pain, swelling, neck pain, and right arm pain, so she eventually went to the emergency department for evaluation  The patient had extensive workup, which revealed a possible C3 fracture  She was noted to be quite tender on exam, so she was placed aspirin collar, and transferred to [facility] for further evaluation, trauma evaluation, and evaluation by Neurosurgery  The patient states that her pain is currently controlled  She denies any numbness, tingling, or weakness of the extremities"    Procedures Performed: No orders of the defined types were placed in this encounter  Summary of Hospital Course:   Patient was admitted and Neurosurgery and a TLSO brace was recommended  She worked with PT and OT  She was discharged to home  Significant Findings, Care, Treatment and Services Provided:     Xr Spine Thoracic 3 Vw    Result Date: 9/15/2019  Impression: Unchanged mild superior endplate compression fracture of T3  Workstation performed: [id number]       Complications: none    Condition at Discharge: good         Discharge instructions/Information to patient and family:   See after visit summary for information provided to patient and family  Provisions for Follow-Up Care:  See after visit summary for information related to follow-up care and any pertinent home health orders  PCP: Maryellen Smyth MD    Disposition: Home    Planned Readmission: No    Discharge Statement   I spent 23 minutes discharging the patient  This time was spent on the day of discharge  I had direct contact with the patient on the day of discharge  Additional documentation is required if more than 30 minutes were spent on discharge  Discharge Medications:  See after visit summary for reconciled discharge medications provided to patient and family

## 2019-09-17 NOTE — ASSESSMENT & PLAN NOTE
CT C-spine final read is no traumatic fracture  Patient has muscle soreness around C6 - T1  No parathesias, FROM of extremities, equal hand grasps

## 2019-09-17 NOTE — TELEPHONE ENCOUNTER
09/17/2019-LEONIDAS (09/16/2019) NEEDS FOLLOW UP 2 WEEKS WITH PA, UPRIGHT X-RAYS  CALLED PT, CONFIRMED 09/30/2019 AND TO HAVE XRAY COMPLETED PRIOR TO APT

## 2019-09-17 NOTE — PLAN OF CARE
Problem: PAIN - ADULT  Goal: Verbalizes/displays adequate comfort level or baseline comfort level  Description  Interventions:  - Encourage patient to monitor pain and request assistance  - Assess pain using appropriate pain scale  - Administer analgesics based on type and severity of pain and evaluate response  - Implement non-pharmacological measures as appropriate and evaluate response  - Consider cultural and social influences on pain and pain management  - Notify physician/advanced practitioner if interventions unsuccessful or patient reports new pain  9/17/2019 0937 by Eden Lyman RN  Outcome: Adequate for Discharge  9/17/2019 0732 by Eden Lyman RN  Outcome: Progressing     Problem: INFECTION - ADULT  Goal: Absence or prevention of progression during hospitalization  Description  INTERVENTIONS:  - Assess and monitor for signs and symptoms of infection  - Monitor lab/diagnostic results  - Monitor all insertion sites, i e  indwelling lines, tubes, and drains  - Monitor endotracheal if appropriate and nasal secretions for changes in amount and color  - [place] appropriate cooling/warming therapies per order  - Administer medications as ordered  - Instruct and encourage patient and family to use good hand hygiene technique  - Identify and instruct in appropriate isolation precautions for identified infection/condition  9/17/2019 0937 by Eden Lyman RN  Outcome: Adequate for Discharge  9/17/2019 0732 by Eden Lyman RN  Outcome: Progressing     Problem: SAFETY ADULT  Goal: Patient will remain free of falls  Description  INTERVENTIONS:  - Assess patient frequently for physical needs  -  Identify cognitive and physical deficits and behaviors that affect risk of falls    -  [place] fall precautions as indicated by assessment   - Educate patient/family on patient safety including physical limitations  - Instruct patient to call for assistance with activity based on assessment  - Modify environment to reduce risk of injury  - Consider OT/PT consult to assist with strengthening/mobility  9/17/2019 0937 by Cait Taylor RN  Outcome: Adequate for Discharge  9/17/2019 0732 by Cait Taylor RN  Outcome: Progressing  Goal: Maintain or return to baseline ADL function  Description  INTERVENTIONS:  -  Assess patient's ability to carry out ADLs; assess patient's baseline for ADL function and identify physical deficits which impact ability to perform ADLs (bathing, care of mouth/teeth, toileting, grooming, dressing, etc )  - Assess/evaluate cause of self-care deficits   - Assess range of motion  - Assess patient's mobility; develop plan if impaired  - Assess patient's need for assistive devices and provide as appropriate  - Encourage maximum independence but intervene and supervise when necessary  - Involve family in performance of ADLs  - Assess for home care needs following discharge   - Consider OT consult to assist with ADL evaluation and planning for discharge  - Provide patient education as appropriate  9/17/2019 0937 by Cait Taylor RN  Outcome: Adequate for Discharge  9/17/2019 0732 by Cait Taylor RN  Outcome: Progressing  Goal: Maintain or return mobility status to optimal level  Description  INTERVENTIONS:  - Assess patient's baseline mobility status (ambulation, transfers, stairs, etc )    - Identify cognitive and physical deficits and behaviors that affect mobility  - Identify mobility aids required to assist with transfers and/or ambulation (gait belt, sit-to-stand, lift, walker, cane, etc )  - [place] fall precautions as indicated by assessment  - Record patient progress and toleration of activity level on Mobility SBAR; progress patient to next Phase/Stage  - Instruct patient to call for assistance with activity based on assessment  - Consider rehabilitation consult to assist with strengthening/weightbearing, etc   9/17/2019 0937 by Cait Taylor RN  Outcome: Adequate for Discharge  9/17/2019 0732 by Nikko Brown RN  Outcome: Progressing     Problem: DISCHARGE PLANNING  Goal: Discharge to home or other facility with appropriate resources  Description  INTERVENTIONS:  - Identify barriers to discharge w/patient and caregiver  - Arrange for needed discharge resources and transportation as appropriate  - Identify discharge learning needs (meds, wound care, etc )  - Arrange for interpretive services to assist at discharge as needed  - Refer to Case Management Department for coordinating discharge planning if the patient needs post-hospital services based on physician/advanced practitioner order or complex needs related to functional status, cognitive ability, or social support system  9/17/2019 0937 by Nikko Brown RN  Outcome: Adequate for Discharge  9/17/2019 0732 by Nikko Brown RN  Outcome: Progressing     Problem: Knowledge Deficit  Goal: Patient/family/caregiver demonstrates understanding of disease process, treatment plan, medications, and discharge instructions  Description  Complete learning assessment and assess knowledge base    Interventions:  - Provide teaching at level of understanding  - Provide teaching via preferred learning methods  9/17/2019 0937 by Nikko Brown RN  Outcome: Adequate for Discharge  9/17/2019 0732 by Nikko Brown RN  Outcome: Progressing     Problem: Prexisting or High Potential for Compromised Skin Integrity  Goal: Skin integrity is maintained or improved  Description  INTERVENTIONS:  - Identify patients at risk for skin breakdown  - Assess and monitor skin integrity  - Assess and monitor nutrition and hydration status  - Monitor labs   - Assess for incontinence   - Turn and reposition patient  - Assist with mobility/ambulation  - Relieve pressure over bony prominences  - Avoid friction and shearing  - Provide appropriate hygiene as needed including keeping skin clean and dry  - Evaluate need for skin moisturizer/barrier cream  - Collaborate with interdisciplinary team   - Patient/family teaching  - Consider wound care consult   9/17/2019 0937 by Bethany Harrison RN  Outcome: Adequate for Discharge  9/17/2019 0732 by Bethany Harrison RN  Outcome: Progressing     Problem: Potential for Falls  Goal: Patient will remain free of falls  Description  INTERVENTIONS:  - Assess patient frequently for physical needs  -  Identify cognitive and physical deficits and behaviors that affect risk of falls    -  [place] fall precautions as indicated by assessment   - Educate patient/family on patient safety including physical limitations  - Instruct patient to call for assistance with activity based on assessment  - Modify environment to reduce risk of injury  - Consider OT/PT consult to assist with strengthening/mobility  9/17/2019 0937 by Bethany Harrison RN  Outcome: Adequate for Discharge  9/17/2019 0732 by Bethany Harrison RN  Outcome: Progressing

## 2019-09-17 NOTE — PLAN OF CARE
Problem: PAIN - ADULT  Goal: Verbalizes/displays adequate comfort level or baseline comfort level  Description  Interventions:  - Encourage patient to monitor pain and request assistance  - Assess pain using appropriate pain scale  - Administer analgesics based on type and severity of pain and evaluate response  - Implement non-pharmacological measures as appropriate and evaluate response  - Consider cultural and social influences on pain and pain management  - Notify physician/advanced practitioner if interventions unsuccessful or patient reports new pain  Outcome: Progressing     Problem: INFECTION - ADULT  Goal: Absence or prevention of progression during hospitalization  Description  INTERVENTIONS:  - Assess and monitor for signs and symptoms of infection  - Monitor lab/diagnostic results  - Monitor all insertion sites, i e  indwelling lines, tubes, and drains  - Monitor endotracheal if appropriate and nasal secretions for changes in amount and color  - [place] appropriate cooling/warming therapies per order  - Administer medications as ordered  - Instruct and encourage patient and family to use good hand hygiene technique  - Identify and instruct in appropriate isolation precautions for identified infection/condition  Outcome: Progressing     Problem: SAFETY ADULT  Goal: Patient will remain free of falls  Description  INTERVENTIONS:  - Assess patient frequently for physical needs  -  Identify cognitive and physical deficits and behaviors that affect risk of falls    -  [place] fall precautions as indicated by assessment   - Educate patient/family on patient safety including physical limitations  - Instruct patient to call for assistance with activity based on assessment  - Modify environment to reduce risk of injury  - Consider OT/PT consult to assist with strengthening/mobility  Outcome: Progressing  Goal: Maintain or return to baseline ADL function  Description  INTERVENTIONS:  -  Assess patient's ability to carry out ADLs; assess patient's baseline for ADL function and identify physical deficits which impact ability to perform ADLs (bathing, care of mouth/teeth, toileting, grooming, dressing, etc )  - Assess/evaluate cause of self-care deficits   - Assess range of motion  - Assess patient's mobility; develop plan if impaired  - Assess patient's need for assistive devices and provide as appropriate  - Encourage maximum independence but intervene and supervise when necessary  - Involve family in performance of ADLs  - Assess for home care needs following discharge   - Consider OT consult to assist with ADL evaluation and planning for discharge  - Provide patient education as appropriate  Outcome: Progressing  Goal: Maintain or return mobility status to optimal level  Description  INTERVENTIONS:  - Assess patient's baseline mobility status (ambulation, transfers, stairs, etc )    - Identify cognitive and physical deficits and behaviors that affect mobility  - Identify mobility aids required to assist with transfers and/or ambulation (gait belt, sit-to-stand, lift, walker, cane, etc )  - [place] fall precautions as indicated by assessment  - Record patient progress and toleration of activity level on Mobility SBAR; progress patient to next Phase/Stage  - Instruct patient to call for assistance with activity based on assessment  - Consider rehabilitation consult to assist with strengthening/weightbearing, etc   Outcome: Progressing     Problem: DISCHARGE PLANNING  Goal: Discharge to home or other facility with appropriate resources  Description  INTERVENTIONS:  - Identify barriers to discharge w/patient and caregiver  - Arrange for needed discharge resources and transportation as appropriate  - Identify discharge learning needs (meds, wound care, etc )  - Arrange for interpretive services to assist at discharge as needed  - Refer to Case Management Department for coordinating discharge planning if the patient needs post-hospital services based on physician/advanced practitioner order or complex needs related to functional status, cognitive ability, or social support system  Outcome: Progressing     Problem: Knowledge Deficit  Goal: Patient/family/caregiver demonstrates understanding of disease process, treatment plan, medications, and discharge instructions  Description  Complete learning assessment and assess knowledge base  Interventions:  - Provide teaching at level of understanding  - Provide teaching via preferred learning methods  Outcome: Progressing     Problem: Prexisting or High Potential for Compromised Skin Integrity  Goal: Skin integrity is maintained or improved  Description  INTERVENTIONS:  - Identify patients at risk for skin breakdown  - Assess and monitor skin integrity  - Assess and monitor nutrition and hydration status  - Monitor labs   - Assess for incontinence   - Turn and reposition patient  - Assist with mobility/ambulation  - Relieve pressure over bony prominences  - Avoid friction and shearing  - Provide appropriate hygiene as needed including keeping skin clean and dry  - Evaluate need for skin moisturizer/barrier cream  - Collaborate with interdisciplinary team   - Patient/family teaching  - Consider wound care consult   Outcome: Progressing     Problem: Potential for Falls  Goal: Patient will remain free of falls  Description  INTERVENTIONS:  - Assess patient frequently for physical needs  -  Identify cognitive and physical deficits and behaviors that affect risk of falls    -  [place] fall precautions as indicated by assessment   - Educate patient/family on patient safety including physical limitations  - Instruct patient to call for assistance with activity based on assessment  - Modify environment to reduce risk of injury  - Consider OT/PT consult to assist with strengthening/mobility  Outcome: Progressing

## 2019-09-18 NOTE — ED PROVIDER NOTES
History  Chief Complaint   Patient presents with    Fall     PT co falling while getting out of bed last week she fell hitting her head on the night stand  +LOC, + blood thinners  PT co of chest pain and back pain  HPI    Prior to Admission Medications   Prescriptions Last Dose Informant Patient Reported? Taking? amLODIPine (NORVASC) 5 mg tablet   Yes Yes   Sig: Take by mouth   aspirin 81 MG tablet   Yes Yes   Sig: Take by mouth   citalopram (CeleXA) 20 mg tablet   Yes Yes   Sig: citalopram 20 mg tablet   fluticasone-salmeterol (ADVAIR DISKUS) 250-50 mcg/dose inhaler   Yes No   Sig: Advair Diskus 250 mcg-50 mcg/dose powder for inhalation   gabapentin (NEURONTIN) 100 mg capsule   Yes Yes   Sig: gabapentin 100 mg capsule   levothyroxine 100 mcg tablet   Yes Yes   Sig: Take by mouth   lisinopril (ZESTRIL) 20 mg tablet   Yes Yes   Sig: Take by mouth   nystatin (MYCOSTATIN) ointment   Yes Yes   Sig: Nystatin [id number] UNIT/GM External Ointment  APPLY INCH  PRN   Refills: 0    Active   omeprazole (PriLOSEC) 20 mg delayed release capsule   Yes Yes   Sig: Take 20 mg by mouth 2 (two) times a day      Facility-Administered Medications: None       Past Medical History:   Diagnosis Date    Angina pectoris (HCC)     Arthritis     Asthma     COPD (chronic obstructive pulmonary disease) (HCC)     Deaf, right     Disease of thyroid gland     hypothyroidism    GERD (gastroesophageal reflux disease)     Glaucoma     Hiatal hernia     Hyperlipidemia     Hypertension     Renal disorder        Past Surgical History:   Procedure Laterality Date    ABDOMINAL SURGERY      exploratory surgery    APPENDECTOMY      BREAST SURGERY      Bilateral biopsys, benign    COLONOSCOPY N/A 8/14/2018    Procedure: COLONOSCOPY;  Surgeon: Rodney Guy MD;  Location: [facility];   Service: Gastroenterology    EAR SURGERY      EYE SURGERY      cataracts     KNEE ARTHROSCOPY Left     shoulder    NOSE SURGERY      rhinoplasty    TONSILLECTOMY         Family History   Problem Relation Age of Onset    Heart disease Mother     Heart disease Father     Diabetes Brother     Cancer Brother     Cancer Son         lymphoma     I have reviewed and agree with the history as documented      Social History     Tobacco Use    Smoking status: Former Smoker     Last attempt to quit: 1990     Years since quittin 1    Smokeless tobacco: Never Used   Substance Use Topics    Alcohol use: Not Currently    Drug use: No        Review of Systems    Physical Exam  Physical Exam    Vital Signs  ED Triage Vitals [19]   Temperature Pulse Respirations Blood Pressure SpO2   97 9 °F (36 6 °C) 73 18 (!) 87/68 97 %      Temp Source Heart Rate Source Patient Position - Orthostatic VS BP Location FiO2 (%)   Oral Monitor Sitting Left arm --      Pain Score       6           Vitals:    19 2230 19 2300 09/15/19 0000 09/15/19 0206   BP: 138/60 141/64 128/64 134/85   Pulse: 73 74 66 70   Patient Position - Orthostatic VS:    Lying         Visual Acuity      ED Medications  Medications   fentanyl citrate (PF) 100 MCG/2ML 50 mcg (50 mcg Intravenous Given 19)   sodium chloride 0 9 % bolus 1,000 mL (0 mL Intravenous Stopped 9/15/19 0018)   iohexol (OMNIPAQUE) 350 MG/ML injection (MULTI-DOSE) 100 mL (100 mL Intravenous Given 19)       Diagnostic Studies  Results Reviewed     Procedure Component Value Units Date/Time    Troponin I [[id number]]  (Normal) Collected:  09/15/19 0034    Lab Status:  Final result Specimen:  Blood from Arm, Left Updated:  09/15/19 0057     Troponin I <0 02 ng/mL     Basic metabolic panel [[id number]]  (Abnormal) Collected:  19    Lab Status:  Final result Specimen:  Blood from Arm, Right Updated:  19     Sodium 139 mmol/L      Potassium 3 9 mmol/L      Chloride 103 mmol/L      CO2 30 mmol/L      ANION GAP 6 mmol/L      BUN 19 mg/dL      Creatinine 1 38 mg/dL      Glucose 85 mg/dL      Calcium 9 1 mg/dL      eGFR 36 ml/min/1 73sq m     Narrative:       National Kidney Disease Foundation guidelines for Chronic Kidney Disease (CKD):     Stage 1 with normal or high GFR (GFR > 90 mL/min/1 73 square meters)    Stage 2 Mild CKD (GFR = 60-89 mL/min/1 73 square meters)    Stage 3A Moderate CKD (GFR = 45-59 mL/min/1 73 square meters)    Stage 3B Moderate CKD (GFR = 30-44 mL/min/1 73 square meters)    Stage 4 Severe CKD (GFR = 15-29 mL/min/1 73 square meters)    Stage 5 End Stage CKD (GFR <15 mL/min/1 73 square meters)  Note: GFR calculation is accurate only with a steady state creatinine    CBC and differential [[id number]]  (Abnormal) Collected:  09/14/19 2112    Lab Status:  Final result Specimen:  Blood from Arm, Right Updated:  09/14/19 2120     WBC 10 63 Thousand/uL      RBC 5 01 Million/uL      Hemoglobin 13 3 g/dL      Hematocrit 43 8 %      MCV 87 fL      MCH 26 5 pg      MCHC 30 4 g/dL      RDW 15 4 %      MPV 11 5 fL      Platelets 895 Thousands/uL      nRBC 0 /100 WBCs      Neutrophils Relative 59 %      Immat GRANS % 1 %      Lymphocytes Relative 22 %      Monocytes Relative 11 %      Eosinophils Relative 6 %      Basophils Relative 1 %      Neutrophils Absolute 6 37 Thousands/µL      Immature Grans Absolute 0 11 Thousand/uL      Lymphocytes Absolute 2 31 Thousands/µL      Monocytes Absolute 1 14 Thousand/µL      Eosinophils Absolute 0 64 Thousand/µL      Basophils Absolute 0 06 Thousands/µL                  RADIOLOGY RESULTS   Final Result by  (09/17 8948)      CT head without contrast   ED Interpretation by Harper Hurst DO (09/15 0023)   No acute intracranial abnormality  Final Result by Mariza Reyes DO (09/15 6288)   Moderate cerebral atrophy with chronic small vessel ischemic change  No acute intracranial abnormality          Findings are consistent with the preliminary report from [facility] which was provided shortly after completion of the exam                      Workstation performed: [id number]         CT facial bones without contrast   ED Interpretation by Catalino Arvizu DO (09/15 0019)   No acute findings      Final Result by Lisette Guevara DO (09/15 5690)   No evidence of traumatic facial bone injury  Findings are consistent with the preliminary report from [facility] which was provided shortly after completion of the exam          Workstation performed: [id number]         CT spine cervical without contrast   ED Interpretation by Catalino Arvizu DO (09/15 0024)   Superior end plate irregularity at C3 which may reflect discogenic change or age indeterminate fracture for which correlation is recommended  Final Result by Lisette Guevara DO (09/15 9404)   1  Degenerative changes cervical spine  No acute cervical spine fracture or traumatic malalignment  2   Slight loss in the axial height of the T3 vertebral body along its superior endplate  Mild compression fracture not excluded  Findings are consistent with the preliminary report from [facility] which was provided shortly after completion of the exam                 Workstation performed: [id number]         CT chest abdomen pelvis w contrast   ED Interpretation by Catalino Arvizu DO (09/15 0020)   No acute findings      Final Result by Lisette Guevara DO (09/15 6717)   1  No traumatic solid or visceral organ injury  2   Mild compression fracture superior endplate T3    3   Subcentimeter noncalcified bilateral pulmonary nodules, largest measuring 5 mm right lower lobe  Based on current Fleischner Society 2017 Guidelines on incidental pulmonary nodule, no routine follow-up is needed if the patient is considered low risk    for lung cancer  If the patient is considered high risk for lung cancer, 12 month follow-up non-contrast chest CT is recommended     4   Incidental 11 mm left ovarian cyst   According to current guidelines (J Am Jun Radiol 2013;10:671-681) in this late postmenopausal woman, this should be evaluated by routine pelvic ultrasound  If this lesion is symptomatic, consider further    evaluation with pelvic ultrasound now  The major findings are in agreement with the preliminary report provided by [facility] which was provided shortly after completion of the exam  The additional finding of  11 mm left ovarian cyst, will now be communicated with patient's clinical    team by our radiology liaison  The study was marked in [facility] for immediate notification  Workstation performed: [id number]         CT recon only thoracolumbar   Final Result by Dannie Snellen, DO (09/15 4194)   1  Mild compression fracture superior endplate T3    2   Degenerative changes throughout the thoracolumbar spine  Degenerative changes within the thoracic spine have progressed from the prior study  Findings are consistent with the preliminary report from [facility] which was provided shortly after completion of the exam          Workstation performed: [id number]         XR shoulder 2+ views RIGHT   ED Interpretation by Yadira Ledesma DO (09/15 0022)   No acute osseous injury dislocation  Final Result by Brielle Mcnulty MD (09/15 1336)      No acute osseous abnormality  Workstation performed: [id number]         XR femur 2 views LEFT   ED Interpretation by Yadira Ledesma DO (09/15 0022)   No acute osseous injury or dislocation  Final Result by Brielle Mcnulty MD (09/15 1336)      No acute osseous abnormality  Workstation performed: [id number]                    Procedures  Procedures       ED Course           Identification of Seniors at Risk      Most Recent Value   (ISAR) Identification of Seniors at Risk   Before the illness or injury that brought you to the Emergency, did you need someone to help you on a regular basis?   0 Filed at: 09/14/2019 2014 In the last 24 hours, have you needed more help than usual?  0 Filed at: 09/14/2019 2014   Have you been hospitalized for one or more nights during the past 6 months? 0 Filed at: 09/14/2019 2014   In general, do you see well?  0 Filed at: 09/14/2019 2014   In general, do you have serious problems with your memory?    Do you take more than three different medications every day? 1 Filed at: 09/14/2019 2014   ISAR Score  1 Filed at: 09/14/2019 2014                          MDM  Number of Diagnoses or Management Options  C3 cervical fracture ([facility] [address] ):   Fall, initial encounter:       Disposition  Final diagnoses:   Fall, initial encounter   C3 cervical fracture ([facility] [address] )     Time reflects when diagnosis was documented in both MDM as applicable and the Disposition within this note     Time User Action Codes Description Comment    9/15/2019 12:39 AM Pati Denton Add [C22  XCQG] Fall, initial encounter     9/15/2019 12:39 AM Pati Denton Add [S12 200A] C3 cervical fracture [facility])       ED Disposition     ED Disposition Condition Date/Time Comment    Transfer to Another [facility] Sep 15, 2019 4951 Reece Leggett should be transferred out to B          MD Documentation      Most Recent Value   Patient Condition  The patient has been stabilized such that within reasonable medical probability, no material deterioration of the patient condition or the condition of the unborn child(el) is likely to result from the transfer   Reason for Transfer  Level of Care needed not available at this facility   Benefits of Transfer  Specialized equipment and/or services available at the receiving facility (Include comment)________________________ Danica Fink, neuro surgery]   Risks of Transfer  Potential for delay in receiving treatment, Potential deterioration of medical condition, Loss of IV, Possible worsening of condition or death during transfer, Increased discomfort during transfer   Accepting Physician [address], [place] [address]   Sending MD [place]   Provider Certification  General risk, such as traffic hazards, adverse weather conditions, rough terrain or turbulence, possible failure of equipment (including vehicle or aircraft), or consequences of actions of persons outside the control of the transport personnel, Unanticipated needs of medical equipment and personnel during transport, The possibility of a transport vehicle being unavailable, Risk of worsening condition      RN Documentation      [facility] [place] [address]   SLB      Follow-up Information    None         Discharge Medication List as of 9/15/2019  2:14 AM      CONTINUE these medications which have NOT CHANGED    Details   amLODIPine (NORVASC) 5 mg tablet Take by mouth, Starting Fri 3/21/2014, Historical Med      aspirin 81 MG tablet Take by mouth, Historical Med      citalopram (CeleXA) 20 mg tablet citalopram 20 mg tablet, Historical Med      gabapentin (NEURONTIN) 100 mg capsule gabapentin 100 mg capsule, Historical Med      levothyroxine 100 mcg tablet Take by mouth, Starting Fri 3/21/2014, Historical Med      lisinopril (ZESTRIL) 20 mg tablet Take by mouth, Starting Fri 12/20/2013, Historical Med      nystatin (MYCOSTATIN) ointment Nystatin [id number] UNIT/GM External Ointment  APPLY INCH  PRN   Refills: 0    Active, Historical Med      omeprazole (PriLOSEC) 20 mg delayed release capsule Take 20 mg by mouth 2 (two) times a day, Historical Med      fluticasone-salmeterol (ADVAIR DISKUS) 250-50 mcg/dose inhaler Advair Diskus 250 mcg-50 mcg/dose powder for inhalation, Historical Med           No discharge procedures on file      ED Provider  Electronically Signed by           Elo Jo DO  09/30/19 4038

## 2019-09-23 ENCOUNTER — HOSPITAL ENCOUNTER (OUTPATIENT)
Dept: RADIOLOGY | Facility: HOSPITAL | Age: 84
Discharge: HOME | End: 2019-09-23
Payer: MEDICARE

## 2019-09-23 DIAGNOSIS — S22.000A CLOSED COMPRESSION FRACTURE OF THORACIC VERTEBRA, INITIAL ENCOUNTER (HCC): ICD-10-CM

## 2019-09-23 PROCEDURE — 72070 X-RAY EXAM THORAC SPINE 2VWS: CPT

## 2019-09-30 ENCOUNTER — OFFICE VISIT (OUTPATIENT)
Dept: NEUROSURGERY | Facility: CLINIC | Age: 84
End: 2019-09-30
Payer: MEDICARE

## 2019-09-30 VITALS
TEMPERATURE: 96.8 F | HEART RATE: 62 BPM | SYSTOLIC BLOOD PRESSURE: 113 MMHG | HEIGHT: 58 IN | WEIGHT: 200.2 LBS | BODY MASS INDEX: 42.02 KG/M2 | RESPIRATION RATE: 14 BRPM | DIASTOLIC BLOOD PRESSURE: 75 MMHG

## 2019-09-30 DIAGNOSIS — S22.030A COMPRESSION FRACTURE OF T3 VERTEBRA (HCC): Primary | ICD-10-CM

## 2019-09-30 PROCEDURE — 99213 OFFICE O/P EST LOW 20 MIN: CPT | Performed by: PHYSICIAN ASSISTANT

## 2019-09-30 NOTE — PROGRESS NOTES
Office Note - Neurosurgery   Reece Leggett 82 y o  female MRN: [id number]      Assessment:  Patient is [de-identified]years old here to go over her 2 weeks follow-up thoracic spine x-rays for traumatic superior endplate of T3 fracture following fall  Patient on TLSo reports some pain in her back without radiculopathy  She is not wearing [place] collar  CT did not show any cervical fracture  But she noticed the pain density improved  She is currently taking Tylenol and ibuprofen when she feels pain  Patient has gait issues, uses cane for walking  Denies any radiculopathy symptoms including numbness, weakness, paresthesia and extremities  Denies bowel bladder issues   Neuro exam nonfocal   Slight tenderness on posterior cervical and around T3 region  Upright thoracic spine x-rays demonstrates anatomical alignment thoracic spine; difficult to view the T3 superior endplate compression fracture  Hx, PE, images and management plan discussed with the patient and her son  Advised to continue pain medication  For precaution  Follow-up Upright thoracic spine in brace ordered in 4 weeks  Questions and concerns were answered  Patient understands instructions and agreed with the plan  Plan:  1  Upright thoracic spine x-rays in brace in 4 weeks  2  Continue Tylenol/ibuprofen for pain  3  Follow-up in 4 weeks      Subjective/Objective     Chief Complaint" I have some pain in my neck and back"2 weeks follow-up for superior endplate T3 fracture        HPI  Patient is 82years old woman here today for follow-up of her thoracic spine x-rays in brace which demonstrates stable T3 compression fracture  Patient reports mild to moderate posterior neck and thoracic pain, denies any numbness, weakness or paresthesia and extremities  Denies any bowel bladder issues  She is wearing TLSO brace, but no [place] collar  Currently taking ibuprofen and Tylenol alternatively  Using cane for walking    Denies any fever, chills, rigors, cough or chest pain  ROS  ROS personally reviewed and updated  Review of Systems   Constitutional: Positive for fatigue  HENT: Negative  Eyes: Negative  Respiratory: Negative  Cardiovascular: Negative  Gastrointestinal: Negative  Endocrine: Negative  Genitourinary: Negative  Leakage,normal   Musculoskeletal: Positive for gait problem and neck pain (neck pain radiates into upper back around front into chest )  Skin: Negative  Allergic/Immunologic: Negative  Neurological: Positive for numbness (rigth leg/thigh ) and headaches  Negative for dizziness, seizures, syncope and weakness (overall )  Hematological: Bruises/bleeds easily  Psychiatric/Behavioral: Positive for sleep disturbance  Family History    Family History   Problem Relation Age of Onset    Heart disease Mother     Heart disease Father     Diabetes Brother     Cancer Brother     Cancer Son         lymphoma       Social History    Social History     Socioeconomic History    Marital status:       Spouse name: Not on file    Number of children: Not on file    Years of education: Not on file    Highest education level: Not on file   Occupational History    Not on file   Social Needs    Financial resource strain: Not on file    Food insecurity:     Worry: Not on file     Inability: Not on file    Transportation needs:     Medical: Not on file     Non-medical: Not on file   Tobacco Use    Smoking status: Former Smoker     Last attempt to quit: 1990     Years since quittin 1    Smokeless tobacco: Never Used   Substance and Sexual Activity    Alcohol use: Not Currently    Drug use: No    Sexual activity: Not on file   Lifestyle    Physical activity:     Days per week: Not on file     Minutes per session: Not on file    Stress: Not on file   Relationships    Social connections:     Talks on phone: Not on file     Gets together: Not on file     Attends Congregation service: Not on file     Active member of club or organization: Not on file     Attends meetings of clubs or organizations: Not on file     Relationship status: Not on file    Intimate partner violence:     Fear of current or ex partner: Not on file     Emotionally abused: Not on file     Physically abused: Not on file     Forced sexual activity: Not on file   Other Topics Concern    Not on file   Social History Narrative    Not on file       Past Medical History    Past Medical History:   Diagnosis Date    Angina p[facility]is ([facility] [address] )     Arthritis     Asthma     COPD (chronic obstructive pulmonary disease) ([facility] [address] )     Deaf, right     Disease of thyroid gland     hypothyroidism    GERD (gastroesophageal reflux disease)     Glaucoma     Hiatal hernia     Hyperlipidemia     Hypertension     Renal disorder        Surgical History    Past Surgical History:   Procedure Laterality Date    ABDOMINAL SURGERY      exploratory surgery    APPENDECTOMY      BREAST SURGERY      Bilateral biopsys, benign    COLONOSCOPY N/A 8/14/2018    Procedure: COLONOSCOPY;  Surgeon: Shakeel Butt MD;  Location: [facility];   Service: Gastroenterology    EAR SURGERY      EYE SURGERY      cataracts     KNEE ARTHROSCOPY Left     shoulder    NOSE SURGERY      rhinoplasty    TONSILLECTOMY         Medications      Current Outpatient Medications:     acetaminophen (TYLENOL) 325 mg tablet, Take 3 tablets (975 mg total) by mouth every 8 (eight) hours, Disp: 30 tablet, Rfl: 0    amLODIPine (NORVASC) 5 mg tablet, Take by mouth, Disp: , Rfl:     aspirin 81 MG tablet, Take by mouth, Disp: , Rfl:     citalopram (CeleXA) 20 mg tablet, citalopram 20 mg tablet, Disp: , Rfl:     fluticasone-salmeterol (ADVAIR DISKUS) 250-50 mcg/dose inhaler, Advair Diskus 250 mcg-50 mcg/dose powder for inhalation, Disp: , Rfl:     gabapentin (NEURONTIN) 100 mg capsule, gabapentin 100 mg capsule, Disp: , Rfl:     levothyroxine 100 mcg tablet, Take by mouth, Disp: , Rfl:     lisinopril (ZESTRIL) 20 mg tablet, Take by mouth, Disp: , Rfl:     methocarbamol (ROBAXIN) 500 mg tablet, Take 0 5 tablets (250 mg total) by mouth every 6 (six) hours as needed for muscle spasms, Disp: 20 tablet, Rfl: 0    nystatin (MYCOSTATIN) ointment, Nystatin [id number] UNIT/GM External Ointment APPLY INCH  PRN  Refills: 0  Active, Disp: , Rfl:     omeprazole (PriLOSEC) 20 mg delayed release capsule, Take 20 mg by mouth 2 (two) times a day, Disp: , Rfl:     Allergies    Allergies   Allergen Reactions    Erythromycin Anaphylaxis, Rash and Tremor    Pravastatin Other (See Comments)     Pt  Not sure  The following portions of the patient's history were reviewed and updated as appropriate: allergies, current medications, past family history, past medical history, past social history, past surgical history and problem list     Investigations    I personally reviewed the XRAY results with the patient:    Demonstrates stable T3 superior endplate compression fracture and anatomical alignment of thoracic spines  Physical Exam    Vitals:  not currently breastfeeding  ,There is no height or weight on file to calculate BMI  Physical Exam   Constitutional: She is oriented to person, place, and time  She appears well-developed and well-nourished  Eyes: EOM are normal    Cardiovascular: Normal rate  Pulmonary/Chest: Effort normal    Musculoskeletal: She exhibits tenderness  She exhibits no deformity  Neurological: She is alert and oriented to person, place, and time  She has normal strength and normal reflexes  No cranial nerve deficit or sensory deficit  She has a normal Finger-Nose-Finger Test  GCS eye subscore is 4  GCS verbal subscore is 5  GCS motor subscore is 6  She displays no Babinski's sign on the right side  She displays no Babinski's sign on the left side  Reflex Scores:       Tricep reflexes are 2+ on the right side and 2+ on the left side         Bicep reflexes are 2+ on the right side and 2+ on the left side  Brachioradialis reflexes are 2+ on the right side and 2+ on the left side  Patellar reflexes are 2+ on the right side and 2+ on the left side  Achilles reflexes are 2+ on the right side and 2+ on the left side  Skin: Skin is warm  Psychiatric: Her speech is normal      Neurologic Exam     Mental Status   Oriented to person, place, and time  Speech: speech is normal   Knowledge: good  Able to perform simple calculations  Cranial Nerves     CN II   Visual fields full to confrontation  CN III, IV, VI   Extraocular motions are normal    Nystagmus: none     CN V   Right facial sensation deficit: none  Left facial sensation deficit: none    CN VII   Right facial weakness: none  Left facial weakness: none    CN XI   CN XI normal      CN XII   CN XII normal      Motor Exam   Muscle bulk: normal  Overall muscle tone: normal  Right arm tone: normal  Left arm tone: normal  Right arm pronator drift: absent  Left arm pronator drift: absent  Right leg tone: normal  Left leg tone: normal    Strength   Strength 5/5 throughout       Sensory Exam   Light touch normal      Gait, Coordination, and Reflexes     Coordination   Finger to nose coordination: normal    Reflexes   Right brachioradialis: 2+  Left brachioradialis: 2+  Right biceps: 2+  Left biceps: 2+  Right triceps: 2+  Left triceps: 2+  Right patellar: 2+  Left patellar: 2+  Right achilles: 2+  Left achilles: 2+  Right : 2+  Left : 2+  Right Zuleta: absent  Left Zuleta: absent  Right ankle clonus: absent  Left ankle clonus: absent

## 2019-10-19 ENCOUNTER — HOSPITAL ENCOUNTER (OUTPATIENT)
Dept: RADIOLOGY | Facility: HOSPITAL | Age: 84
Discharge: HOME | End: 2019-10-19
Payer: MEDICARE

## 2019-10-19 DIAGNOSIS — S22.030A COMPRESSION FRACTURE OF T3 VERTEBRA (HCC): ICD-10-CM

## 2019-10-19 PROCEDURE — 72070 X-RAY EXAM THORAC SPINE 2VWS: CPT

## 2019-10-28 ENCOUNTER — OFFICE VISIT (OUTPATIENT)
Dept: NEUROSURGERY | Facility: CLINIC | Age: 84
End: 2019-10-28
Payer: MEDICARE

## 2019-10-28 VITALS
HEART RATE: 69 BPM | WEIGHT: 200.2 LBS | SYSTOLIC BLOOD PRESSURE: 135 MMHG | HEIGHT: 58 IN | DIASTOLIC BLOOD PRESSURE: 81 MMHG | BODY MASS INDEX: 42.02 KG/M2 | RESPIRATION RATE: 16 BRPM

## 2019-10-28 DIAGNOSIS — S22.030D CLOSED WEDGE COMPRESSION FRACTURE OF THIRD THORACIC VERTEBRA WITH ROUTINE HEALING, SUBSEQUENT ENCOUNTER: Primary | ICD-10-CM

## 2019-10-28 PROCEDURE — 99213 OFFICE O/P EST LOW 20 MIN: CPT | Performed by: PHYSICIAN ASSISTANT

## 2019-10-28 NOTE — PROGRESS NOTES
Office Note - Neurosurgery   Josep All 82 y o  female MRN: [id number]      Assessment:  Patient is 82years old woman here for 6 weeks follow-up of superior endplate T3 fracture secondary to fall  Had thoracic spine x-rays which demonstrates stable T3 fracture and anatomical thoracic spines  Patient reports mild pain on her shoulder blade associated with movement and certain activities  She is on TLSO brace  Patient denies any numbness, weakness or paresthesia and extremities  Has chronic balance issues in the setting of bilateral knee osteoarthritis  She use cane for walking  Neuro exam-slight tenderness at T3 level on palpation  Otherwise neuro exam nonfocal   Patient wearing TLSO brace  Hx, PE, images discussed with patient and her son  Flexion-extension thoracic spine x-rays ordered for 4 weeks  Advised to keep wearing TLSO brace when upright and at 45 degree head of bed  For precautions  Patient's questions and concerns were answered  Patient and her son understand the instructions and agreed with the plan  Follow-up in 4 weeks with flexion-extension and swimmer's views of thoracic spines  Plan:  1  Flexion-extension of thoracic spine x-rays include swimmer's view  2  Follow-up in 4 weeks    Subjective/Objective     Chief Complaint: " I have mild pain my shoulder blades"        HPI   Patient is [de-identified]years old woman here for 6 weeks follow-up of closed superior endplate compression fracture of T3 secondary to fall  Patient reports mild shoulder blade pain, otherwise denies any numbness, weakness or paresthesia and extremities  Denies bowel bladder issues  She has chronic gait instability in the setting of chronic bilateral knee pain, uses cane for walking  Denies fever, chills, rigors, cough or chest pain  Patient is wearing TLSO brace when upright and doing certain activities  STEPHANY HAMMOND personally reviewed and updated  Review of Systems   Constitutional: Positive for fatigue  HENT: Negative  Eyes: Negative  Respiratory: Negative  Cardiovascular: Negative  Gastrointestinal: Negative  Endocrine: Negative  Genitourinary: Negative  Leakage,normal   Musculoskeletal: Positive for gait problem and neck pain (neck pain radiates into upper back  )  Skin: Negative  Allergic/Immunologic: Negative  Neurological: Positive for headaches  Negative for dizziness, seizures, syncope, weakness (overall ) and numbness  Hematological: Bruises/bleeds easily  Psychiatric/Behavioral: Positive for sleep disturbance  Family History    Family History   Problem Relation Age of Onset    Heart disease Mother     Heart disease Father     Diabetes Brother     Cancer Brother     Cancer Son         lymphoma       Social History    Social History     Socioeconomic History    Marital status:       Spouse name: Not on file    Number of children: Not on file    Years of education: Not on file    Highest education level: Not on file   Occupational History    Not on file   Social Needs    Financial resource strain: Not on file    Food insecurity:     Worry: Not on file     Inability: Not on file    Transportation needs:     Medical: Not on file     Non-medical: Not on file   Tobacco Use    Smoking status: Former Smoker     Last attempt to quit: 1990     Years since quittin 2    Smokeless tobacco: Never Used   Substance and Sexual Activity    Alcohol use: Not Currently    Drug use: No    Sexual activity: Not on file   Lifestyle    Physical activity:     Days per week: Not on file     Minutes per session: Not on file    Stress: Not on file   Relationships    Social connections:     Talks on phone: Not on file     Gets together: Not on file     Attends [employer] service: Not on file     Active member of club or organization: Not on file     Attends meetings of clubs or organizations: Not on file     Relationship status: Not on file    Intimate partner violence:     Fear of current or ex partner: Not on file     Emotionally abused: Not on file     Physically abused: Not on file     Forced sexual activity: Not on file   Other Topics Concern    Not on file   Social History Narrative    Not on file       Past Medical History    Past Medical History:   Diagnosis Date    [facility]ina [facility]is [facility] [address]     Arthritis     Asthma     COPD (chronic obstructive pulmonary disease) ([facility] [address] [facility]     Deaf, right     Disease of thyroid gland     hypothyroidism    GERD (gastroesophageal reflux disease)     Glaucoma     Hiatal hernia     Hyperlipidemia     Hypertension     Renal disorder        Surgical History    Past Surgical History:   Procedure Laterality Date    ABDOMINAL SURGERY      exploratory surgery    APPENDECTOMY      BREAST SURGERY      Bilateral biopsys, benign    COLONOSCOPY N/A 8/14/2018    Procedure: COLONOSCOPY;  Surgeon: Yusra Harrell MD;  Location: [facility];   Service: Gastroenterology    EAR SURGERY      EYE SURGERY      cataracts     HEMORRHOID SURGERY      KNEE ARTHROSCOPY Left     shoulder    NOSE SURGERY      rhinoplasty    TONSILLECTOMY      TRIGGER FINGER RELEASE Left        Medications      Current Outpatient Medications:     acetaminophen (TYLENOL) 325 mg tablet, Take 3 tablets (975 mg total) by mouth every 8 (eight) hours, Disp: 30 tablet, Rfl: 0    amLODIPine (NORVASC) 5 mg tablet, Take by mouth, Disp: , Rfl:     aspirin 81 MG tablet, Take by mouth, Disp: , Rfl:     citalopram (CeleXA) 20 mg tablet, citalopram 20 mg tablet, Disp: , Rfl:     fluticasone-salmeterol (ADVAIR DISKUS) 250-50 mcg/dose inhaler, as needed , Disp: , Rfl:     gabapentin (NEURONTIN) 100 mg capsule, Take 100 mg by mouth 4 (four) times a day , Disp: , Rfl:     levothyroxine 100 mcg tablet, Take by mouth, Disp: , Rfl:     lisinopril (ZESTRIL) 20 mg tablet, Take by mouth, Disp: , Rfl:     nystatin (MYCOSTATIN) ointment, Nystatin 762250 UNIT/GM External Ointment APPLY INCH  PRN  Refills: 0  Active, Disp: , Rfl:     omeprazole (PriLOSEC) 20 mg delayed release capsule, Take 20 mg by mouth 2 (two) times a day, Disp: , Rfl:     Allergies    Allergies   Allergen Reactions    Erythromycin Anaphylaxis, Rash and Tremor    Pravastatin Other (See Comments)     Pt  Not sure  The following portions of the patient's history were reviewed and updated as appropriate: allergies, current medications, past family history, past medical history, past social history, past surgical history and problem list     Investigations    I personally reviewed the XRAY results with the patient:    X-ray of his thoracic spine demonstrate stable superior endplate of T3 fracture and anatomical thoracic spines    Physical Exam    Vitals:  not currently breastfeeding  ,There is no height or weight on file to calculate BMI  Physical Exam   Constitutional: She is oriented to person, place, and time  She appears well-developed and well-nourished  Eyes: EOM are normal    Neck: Normal range of motion  Cardiovascular: Normal rate  Pulmonary/Chest: Effort normal    Musculoskeletal: She exhibits tenderness  Slight tenderness on palpation at T3 level   Neurological: She is alert and oriented to person, place, and time  She has normal strength and normal reflexes  No cranial nerve deficit or sensory deficit  She has a normal Finger-Nose-Finger Test  She displays no Babinski's sign on the right side  She displays no Babinski's sign on the left side  Reflex Scores:       Tricep reflexes are 2+ on the right side and 2+ on the left side  Bicep reflexes are 2+ on the right side and 2+ on the left side  Brachioradialis reflexes are 2+ on the right side and 2+ on the left side  Patellar reflexes are 2+ on the right side and 2+ on the left side  Achilles reflexes are 2+ on the right side and 2+ on the left side    Psychiatric: Her speech is normal      Neurologic Exam     Mental Status   Oriented to person, place, and time  Speech: speech is normal   Level of consciousness: alert    Cranial Nerves     CN II   Visual fields full to confrontation  CN III, IV, VI   Extraocular motions are normal    Nystagmus: none     CN V   Right facial sensation deficit: none  Left facial sensation deficit: none    CN VII   Right facial weakness: none  Left facial weakness: none    CN XI   CN XI normal      CN XII   CN XII normal      Motor Exam   Muscle bulk: normal  Overall muscle tone: normal  Right arm tone: normal  Left arm tone: normal  Right arm pronator drift: absent  Left arm pronator drift: absent  Right leg tone: normal  Left leg tone: normal    Strength   Strength 5/5 throughout       Sensory Exam   Light touch normal      Gait, Coordination, and Reflexes     Coordination   Finger to nose coordination: normal    Reflexes   Right brachioradialis: 2+  Left brachioradialis: 2+  Right biceps: 2+  Left biceps: 2+  Right triceps: 2+  Left triceps: 2+  Right patellar: 2+  Left patellar: 2+  Right achilles: 2+  Left achilles: 2+  Right : 2+  Left : 2+  Right plantar: normal  Left plantar: normal  Right Zuleta: absent  Left Zuleta: absent  Right ankle clonus: absent  Left ankle clonus: absent

## 2019-11-13 ENCOUNTER — HOSPITAL ENCOUNTER (OUTPATIENT)
Dept: RADIOLOGY | Facility: HOSPITAL | Age: 84
Discharge: HOME | End: 2019-11-13
Payer: MEDICARE

## 2019-11-13 DIAGNOSIS — S22.030D CLOSED WEDGE COMPRESSION FRACTURE OF THIRD THORACIC VERTEBRA WITH ROUTINE HEALING, SUBSEQUENT ENCOUNTER: ICD-10-CM

## 2019-11-13 PROCEDURE — 72072 X-RAY EXAM THORAC SPINE 3VWS: CPT

## 2019-11-22 ENCOUNTER — OFFICE VISIT (OUTPATIENT)
Dept: NEUROSURGERY | Facility: CLINIC | Age: 84
End: 2019-11-22
Payer: MEDICARE

## 2019-11-22 VITALS
WEIGHT: 200 LBS | DIASTOLIC BLOOD PRESSURE: 100 MMHG | HEART RATE: 69 BPM | RESPIRATION RATE: 16 BRPM | BODY MASS INDEX: 41.98 KG/M2 | SYSTOLIC BLOOD PRESSURE: 147 MMHG | TEMPERATURE: 97.1 F | HEIGHT: 58 IN

## 2019-11-22 DIAGNOSIS — S22.030D CLOSED WEDGE COMPRESSION FRACTURE OF THIRD THORACIC VERTEBRA WITH ROUTINE HEALING, SUBSEQUENT ENCOUNTER: Primary | ICD-10-CM

## 2019-11-22 PROCEDURE — 99214 OFFICE O/P EST MOD 30 MIN: CPT | Performed by: PHYSICIAN ASSISTANT

## 2019-11-22 NOTE — PROGRESS NOTES
Office Note - Neurosurgery   Navdeep Cerda 82 y o  female MRN: [id number]      Assessment:  Patient is [de-identified]years old woman here for 10 weeks follow-up of superior endplate T3 fracture  She had flexion-extension thoracic spine x-rays which demonstrates stable compression T3 fracture  Patient reports remarkable improvement with her back pain  She was wearing TLSO brace  Patient has history of arthritis with pain in the lower backs and also knees, occasional limping  Uses cane for walking  Denies any numbness, weakness or  Paresthesia in the extremities  Denies bowel/bladder dysfunction or saddle anesthesia  Neuro exam-strength is 5/5 bilaterally, sensation to light touch is normal throughout  DTR 2+ without clonus and Babinski negative bilaterally  Minimal tenderness at T3 level on deep palpation  Patient on TLSO brace  Flexion-extension thoracic spine x-rays demonstrate stable compression fracture of T3 without retropulsion  Hx,PE, images and management plan discussed with the patient and her son  Patient advised to gradually wean of her TLSO brace  Patient asks if it is safe to have any procedure  I told her that it back bone fracture usually heals with in 10-12 weeks, but would discussed with Dr Kaiser Alexis and call her back  Advised to walk but with fall precaution  Use cane  No neurosurgical procedure is indicated nor regular follow-up is required  Patient can resume her ADL and stepwise fashion  Watch your back  Avoid excessive bending, lifting or bending  Patient question and concerns were answered  Patient and son expressed their understandings and agreed with the plan  Follow-up on p r n  Basis  Plan:    1  Patient advised to wean of TLSO brace for the next 2 weeks  2  Avoid excessive bending, lifting heavy objects and twisting  3  Fall precaution  4  Follow-up on p r n  Basis  5   Call if there is any concern or problem    Subjective/Objective     Chief Complaint:" I am feeling good"/10 th week T3 fracture follow-up progress note      HPI  Patient is 82years old pleasant lady with stent week follow-up of superior endplate T3 fracture secondary to fall  She had flexion-extension thoracic spine x-rays which demonstrates stable T3 compression fracture without retropulsion  Patient was wearing TLSO for the past 10 weeks   Patient denies any pain at T3 level, complains of lower back pain and left knee issues with limping gait in the setting of arthritis  Denies any numbness, weakness or paresthesia and extremities  Denies bowel/bladder dysfunction or saddle anesthesia  Patient uses cane for walking  Denies fever, chills, rigors, cough or chest pain  ROS  Personally reviewed and updated  Review of Systems   Constitutional: Negative  Eyes:        Getting laser eye sx Dec 9th   Respiratory: Positive for wheezing  Cardiovascular: Negative  Gastrointestinal: Negative  Endocrine: Negative  Genitourinary: Positive for frequency  Musculoskeletal: Positive for back pain  Skin: Negative  Allergic/Immunologic: Negative  Neurological: Negative for weakness and numbness  Hematological:        Aspirin     Psychiatric/Behavioral: Negative for sleep disturbance  Family History    Family History   Problem Relation Age of Onset    Heart disease Mother     Heart disease Father     Diabetes Brother     Cancer Brother     Cancer Son         lymphoma       Social History    Social History     Socioeconomic History    Marital status:       Spouse name: Not on file    Number of children: Not on file    Years of education: Not on file    Highest education level: Not on file   Occupational History    Not on file   Social Needs    Financial resource strain: Not on file    Food insecurity:     Worry: Not on file     Inability: Not on file    Transportation needs:     Medical: Not on file     Non-medical: Not on file   Tobacco Use    Smoking status: Former Smoker Last attempt to quit: 1990     Years since quittin 2    Smokeless tobacco: Never Used   Substance and Sexual Activity    Alcohol use: Not Currently    Drug use: No    Sexual activity: Not on file   Lifestyle    Physical activity:     Days per week: Not on file     Minutes per session: Not on file    Stress: Not on file   Relationships    Social connections:     Talks on phone: Not on file     Gets together: Not on file     Attends [employer] service: Not on file     Active member of club or organization: Not on file     Attends meetings of clubs or organizations: Not on file     Relationship status: Not on file    Intimate partner violence:     Fear of current or ex partner: Not on file     Emotionally abused: Not on file     Physically abused: Not on file     Forced sexual activity: Not on file   Other Topics Concern    Not on file   Social History Narrative    Not on file       Past Medical History    Past Medical History:   Diagnosis Date    23-polyvalent pneumococcal polysaccharide vaccine contraindicated as received shingles vaccine within last 4 weeks     2019    [facility]is [facility] [address]     Arthritis     Asthma     COPD (chronic obstructive pulmonary disease) ([facility] [address] )     Deaf, right     Disease of thyroid gland     hypothyroidism    GERD (gastroesophageal reflux disease)     Glaucoma     Hiatal hernia     Hyperlipidemia     Hypertension     Renal disorder        Surgical History    Past Surgical History:   Procedure Laterality Date    ABDOMINAL SURGERY      exploratory surgery    APPENDECTOMY      BREAST SURGERY      Bilateral biopsys, benign    COLONOSCOPY N/A 2018    Procedure: COLONOSCOPY;  Surgeon: Elaine Srivastava MD;  Location: [facility];   Service: Gastroenterology    EAR SURGERY      EYE SURGERY      cataracts     HEMORRHOID SURGERY      KNEE ARTHROSCOPY Left     shoulder    NOSE SURGERY      rhinoplasty    TONSILLECTOMY      TRIGGER FINGER RELEASE Left        Medications      Current Outpatient Medications:     amLODIPine (NORVASC) 5 mg tablet, Take by mouth, Disp: , Rfl:     aspirin 81 MG tablet, Take by mouth, Disp: , Rfl:     citalopram (CeleXA) 20 mg tablet, citalopram 20 mg tablet, Disp: , Rfl:     fluticasone-salmeterol (ADVAIR DISKUS) 250-50 mcg/dose inhaler, as needed , Disp: , Rfl:     levothyroxine 100 mcg tablet, Take by mouth, Disp: , Rfl:     lisinopril (ZESTRIL) 20 mg tablet, Take by mouth, Disp: , Rfl:     nystatin (MYCOSTATIN) ointment, Nystatin 782251 UNIT/GM External Ointment APPLY INCH  PRN  Refills: 0  Active, Disp: , Rfl:     omeprazole (PriLOSEC) 20 mg delayed release capsule, Take 20 mg by mouth 2 (two) times a day, Disp: , Rfl:     acetaminophen (TYLENOL) 325 mg tablet, Take 3 tablets (975 mg total) by mouth every 8 (eight) hours (Patient not taking: Reported on 11/22/2019), Disp: 30 tablet, Rfl: 0    gabapentin (NEURONTIN) 100 mg capsule, Take 100 mg by mouth 4 (four) times a day , Disp: , Rfl:     Allergies    Allergies   Allergen Reactions    Erythromycin Anaphylaxis, Rash and Tremor    Pravastatin Other (See Comments)     Pt  Not sure  The following portions of the patient's history were reviewed and updated as appropriate: allergies, current medications, past family history, past medical history, past social history, past surgical history and problem list     Investigations    I personally reviewed the XRAY results with the patient:    Findings as stated in the assessment section    Physical Exam    Vitals:  Blood pressure 147/100, pulse 69, temperature (!) 97 1 °F (36 2 °C), temperature source Tympanic, resp  rate 16, height 4' 10" (1 473 m), weight 90 7 kg (200 lb), not currently breastfeeding  ,Body mass index is 41 8 kg/m²  Physical Exam   Constitutional: She is oriented to person, place, and time  She appears well-developed and well-nourished  Eyes: EOM are normal    Neck: Normal range of motion  Cardiovascular: Normal rate  Pulmonary/Chest: Effort normal    Musculoskeletal: She exhibits tenderness  Neurological: She is alert and oriented to person, place, and time  She has normal strength and normal reflexes  No cranial nerve deficit or sensory deficit  She has a normal Finger-Nose-Finger Test  GCS eye subscore is 4  GCS verbal subscore is 5  GCS motor subscore is 6  She displays no Babinski's sign on the right side  She displays no Babinski's sign on the left side  Reflex Scores:       Tricep reflexes are 2+ on the right side and 2+ on the left side  Bicep reflexes are 2+ on the right side and 2+ on the left side  Brachioradialis reflexes are 2+ on the right side and 2+ on the left side  Patellar reflexes are 2+ on the right side and 2+ on the left side  Achilles reflexes are 2+ on the right side and 2+ on the left side  Skin: Skin is warm  Psychiatric: Her speech is normal      Neurologic Exam     Mental Status   Oriented to person, place, and time  Speech: speech is normal   Level of consciousness: alert    Cranial Nerves     CN II   Visual fields full to confrontation  CN III, IV, VI   Extraocular motions are normal    Nystagmus: none     CN V   Right facial sensation deficit: none  Left facial sensation deficit: none    CN VII   Right facial weakness: none  Left facial weakness: none    CN XI   CN XI normal      CN XII   CN XII normal      Motor Exam   Muscle bulk: normal  Overall muscle tone: normal  Right arm tone: normal  Left arm tone: normal  Right arm pronator drift: absent  Left arm pronator drift: absent  Right leg tone: normal  Left leg tone: normal    Strength   Strength 5/5 throughout       Sensory Exam   Light touch normal      Gait, Coordination, and Reflexes     Coordination   Finger to nose coordination: normal    Reflexes   Right brachioradialis: 2+  Left brachioradialis: 2+  Right biceps: 2+  Left biceps: 2+  Right triceps: 2+  Left triceps: 2+  Right patellar: 2+  Left patellar: 2+  Right achilles: 2+  Left achilles: 2+  Right : 2+  Left : 2+  Right plantar: normal  Left plantar: normal  Right Zuleta: absent  Left Zuleta: absent  Right ankle clonus: absent  Left ankle clonus: absent

## 2021-02-10 ENCOUNTER — OFFICE VISIT (OUTPATIENT)
Dept: OBGYN CLINIC | Facility: CLINIC | Age: 86
End: 2021-02-10
Payer: MEDICARE

## 2021-02-10 ENCOUNTER — APPOINTMENT (OUTPATIENT)
Dept: RADIOLOGY | Facility: CLINIC | Age: 86
End: 2021-02-10
Payer: MEDICARE

## 2021-02-10 VITALS
HEIGHT: 58 IN | DIASTOLIC BLOOD PRESSURE: 85 MMHG | HEART RATE: 71 BPM | BODY MASS INDEX: 44.21 KG/M2 | WEIGHT: 210.6 LBS | SYSTOLIC BLOOD PRESSURE: 138 MMHG

## 2021-02-10 DIAGNOSIS — M17.12 PRIMARY OSTEOARTHRITIS OF LEFT KNEE: Primary | ICD-10-CM

## 2021-02-10 DIAGNOSIS — M25.562 LEFT KNEE PAIN, UNSPECIFIED CHRONICITY: ICD-10-CM

## 2021-02-10 PROCEDURE — 99203 OFFICE O/P NEW LOW 30 MIN: CPT | Performed by: ORTHOPAEDIC SURGERY

## 2021-02-10 PROCEDURE — 73562 X-RAY EXAM OF KNEE 3: CPT

## 2021-02-10 RX ORDER — LEVOTHYROXINE SODIUM 88 UG/1
88 TABLET ORAL DAILY
COMMUNITY
Start: 2021-01-17

## 2021-02-10 RX ORDER — AZELASTINE HYDROCHLORIDE 0.5 MG/ML
SOLUTION/ DROPS OPHTHALMIC
COMMUNITY
Start: 2020-06-08

## 2021-02-10 RX ORDER — CLOTRIMAZOLE AND BETAMETHASONE DIPROPIONATE 10; .64 MG/G; MG/G
CREAM TOPICAL
COMMUNITY

## 2021-02-10 RX ORDER — ATORVASTATIN CALCIUM 40 MG/1
TABLET, FILM COATED ORAL
COMMUNITY
Start: 2020-11-25

## 2021-02-10 RX ORDER — ALENDRONATE SODIUM 35 MG/1
TABLET ORAL
COMMUNITY

## 2021-02-10 RX ORDER — IBUPROFEN 600 MG/1
1 TABLET ORAL EVERY 24 HOURS
COMMUNITY

## 2021-02-10 RX ORDER — FAMOTIDINE 20 MG/1
TABLET, FILM COATED ORAL
COMMUNITY

## 2021-02-10 NOTE — PROGRESS NOTES
HPI:  Patient is a 82y o  year old female who presents with chief complaint of left knee pain  Pt states that she has been having pain in her left knee that is been worsening for the last 3 years  Patient states that she has both medial and lateral knee pain  Patient also complains of lower back pain with pain that radiates to her ankle and numbness in her left foot  Patient is not seeing anyone for care of her lumbar spine  Patient states that for pain she has an oxycodone prescribed by her primary care physician  Patient denies injury to her left knee  Patient previously seen by Dr Yazan Knight at [facility] and by Dr Katherine Hill at [facility]  Dr Katherine Hill provided her with a CSI that provided a few weeks of relief  Patient states that at times her left knee gives out  (has pain first) Patient denies catching and locking  Patient states that at times she loses her balance and falls backwards but catches herself  Patient is not diabetic  Patient uses a cane outside the home and a walker while in the home  Pt has hx of T3 compression fracture in 9/2019 treated by neurology         ROS:   General: No fever, no chills, no weight loss, no weight gain  HEENT:  No loss of hearing, no nose bleeds, no sore throat  Eyes:  No eye pain, no red eyes, no visual disturbance  Respiratory:  No cough, no shortness of breath, no wheezing  Cardiovascular:  No chest pain, no palpitations, no edema  GI: No abdominal pain, no nausea, no vomiting  Endocrine: No frequent urination, no excessive thirst  Urinary:  No dysuria, no hematuria, no incontinence  Musculoskeletal: see HPI and PE  Skin:  No rash, no wounds  Neurological:  No dizziness, no headache, no numbness  Psychiatric:  No difficulty concentrating, no depression, no suicide thoughts, no anxiety  Review of all other systems is negative    PMH:  Past Medical History:   Diagnosis Date    23-polyvalent pneumococcal polysaccharide vaccine contraindicated as received shingles vaccine within last 4 weeks     October 24, 2019    Angina pectoris [facility])     Arthritis     Asthma     COPD (chronic obstructive pulmonary disease) ([address] )     Deaf, right     Disease of thyroid gland     hypothyroidism    GERD (gastroesophageal reflux disease)     Glaucoma     Hiatal hernia     Hyperlipidemia     Hypertension     Renal disorder        PSH:  Past Surgical History:   Procedure Laterality Date    ABDOMINAL SURGERY      exploratory surgery    APPENDECTOMY      BREAST SURGERY      Bilateral biopsys, benign    COLONOSCOPY N/A 8/14/2018    Procedure: COLONOSCOPY;  Surgeon: Bernice Lin MD;  Location: [facility];   Service: Gastroenterology    EAR SURGERY      EYE SURGERY      cataracts     HEMORRHOID SURGERY      NOSE SURGERY      rhinoplasty    SHOULDER SURGERY  2008    left     TONSILLECTOMY      TRIGGER FINGER RELEASE Left        Medications:  Current Outpatient Medications   Medication Sig Dispense Refill    alendronate (FOSAMAX) 35 mg tablet alendronate 35 mg tablet   TAKE 1 TABLET BY MOUTH EVERY WEEK IN THE MORNING      amLODIPine (NORVASC) 5 mg tablet Take by mouth      aspirin 81 MG tablet Take by mouth      atorvastatin (LIPITOR) 40 mg tablet atorvastatin 40 mg tablet   TAKE 1 TABLET BY MOUTH EVERY DAY AT NIGHT      azelastine (OPTIVAR) 0 05 % ophthalmic solution azelastine 0 05 % eye drops   INSTILL 1 DROP INTO BOTH EYES TWICE A DAY      citalopram (CeleXA) 20 mg tablet citalopram 20 mg tablet      clotrimazole-betamethasone (LOTRISONE) 1-0 05 % cream clotrimazole-betamethasone 1 %-0 05 % topical cream   APPLY TO GROIN TWICE A DAY FOR TWO WEEKS      fluticasone-salmeterol (ADVAIR DISKUS) 250-50 mcg/dose inhaler as needed       gabapentin (NEURONTIN) 100 mg capsule Take 100 mg by mouth 4 (four) times a day       ibuprofen (MOTRIN) 600 mg tablet 1 tablet every 24 hours      levothyroxine 100 mcg tablet Take by mouth      levothyroxine 88 mcg tablet Take 88 mcg by mouth daily      lisinopril (ZESTRIL) 20 mg tablet Take by mouth      nystatin (MYCOSTATIN) ointment Nystatin 336801 UNIT/GM External Ointment  APPLY INCH  PRN   Refills: 0    Active      omeprazole (PriLOSEC) 20 mg delayed release capsule Take 20 mg by mouth 2 (two) times a day      acetaminophen (TYLENOL) 325 mg tablet Take 3 tablets (975 mg total) by mouth every 8 (eight) hours (Patient not taking: Reported on 2019) 30 tablet 0    famotidine (PEPCID) 20 mg tablet famotidine 20 mg tablet       No current facility-administered medications for this visit  Allergies: Allergies   Allergen Reactions    Erythromycin Anaphylaxis, Rash and Tremor    Pravastatin Other (See Comments)     Pt  Not sure  Family History:  Family History   Problem Relation Age of Onset    Heart disease Mother     Heart disease Father     Diabetes Brother     Cancer Brother     Cancer Son         lymphoma       Social History:  Social History     Occupational History    Not on file   Tobacco Use    Smoking status: Former Smoker     Quit date: 1990     Years since quittin 5    Smokeless tobacco: Never Used   Substance and Sexual Activity    Alcohol use: Not Currently    Drug use: No    Sexual activity: Not on file       Physical Exam:  General :  Alert, cooperative, no distress, appears stated age  Blood pressure 138/85, pulse 71, height 4' 10" (1 473 m), weight 95 5 kg (210 lb 9 6 oz), not currently breastfeeding  Head:  Normocephalic, without obvious abnormality, atraumatic   Eyes:  Conjunctiva/corneas clear, EOM's intact,   Ears: Both ears normal appearance, no hearing deficits      Nose: Nares normal, septum midline, no drainage    Neck: Supple,  trachea midline, no adenopathy, no tenderness, no mass   Back:   Symmetric, no curvature, ROM normal, no tenderness   Lungs:   Respirations unlabored   Chest Wall:  No tenderness or deformity   Extremities: Extremities normal, atraumatic, no cyanosis or edema      Pulses: 2+ and symmetric   Skin: Skin color, texture, turgor normal, no rashes or lesions      Neurologic: Normal           Left Knee Exam     Tenderness   The patient is experiencing tenderness in the medial joint line  Range of Motion   Extension: normal   Flexion: 140     Tests   Varus: negative Valgus: negative    Other   Erythema: absent  Swelling: none  Effusion: no effusion present    Comments:  Pain with ambulation      Back Exam     Comments:  EHL weakness  Positive straight leg raise               Imaging Studies: The following imaging studies were reviewed in office today  My findings are noted  X-rays of left knee performed today show mild degenerative changes with minimal medial joint space narrowing consistent with osteoarthritis, mild varus deformity    Assessment  Encounter Diagnoses   Name Primary?     Left knee pain, unspecified chronicity     Primary osteoarthritis of left knee Yes         Plan:     patient is an 65-year-old female with left knee osteoarthritis and lumbar radiculopathy  - Pt was  Advised that he x-rays of her left knee do show minimal osteoarthritis with some medial joint space narrowing   -patient was advised that the pain from her left knee arthritis does not radiate to her foot or to her lower back and is likely due to nerve root irritation  - Pt was offered and accepted left knee CSI   - CSi was administered without complications  - order was placed for left knee synvisc one and pt was advised that she will receive a call when  Medications in the office and ready for administration        Scribe Attestation    I,:  Amy Franks am acting as a scribe while in the presence of the attending physician :       I,:  Megha Beckford MD personally performed the services described in this documentation    as scribed in my presence :

## 2021-02-17 ENCOUNTER — OFFICE VISIT (OUTPATIENT)
Dept: OBGYN CLINIC | Facility: CLINIC | Age: 86
End: 2021-02-17
Payer: MEDICARE

## 2021-02-17 VITALS
HEART RATE: 80 BPM | BODY MASS INDEX: 44.08 KG/M2 | HEIGHT: 58 IN | SYSTOLIC BLOOD PRESSURE: 157 MMHG | DIASTOLIC BLOOD PRESSURE: 82 MMHG | WEIGHT: 210 LBS

## 2021-02-17 DIAGNOSIS — M17.12 PRIMARY OSTEOARTHRITIS OF LEFT KNEE: Primary | ICD-10-CM

## 2021-02-17 PROCEDURE — 20610 DRAIN/INJ JOINT/BURSA W/O US: CPT | Performed by: ORTHOPAEDIC SURGERY

## 2021-02-17 RX ORDER — BUDESONIDE AND FORMOTEROL FUMARATE DIHYDRATE 160; 4.5 UG/1; UG/1
2 AEROSOL RESPIRATORY (INHALATION) 2 TIMES DAILY
COMMUNITY
Start: 2021-02-11 | End: 2022-02-11

## 2021-02-17 NOTE — PROGRESS NOTES
Large joint arthrocentesis: L knee  Universal Protocol:  Consent: Verbal consent obtained    Risks and benefits: risks, benefits and alternatives were discussed  Consent given by: patient  Patient understanding: patient states understanding of the procedure being performed  Patient consent: the patient's understanding of the procedure matches consent given  Site marked: the operative site was marked  Supporting Documentation  Indications: pain   Procedure Details  Location: knee - L knee  Preparation: Patient was prepped and draped in the usual sterile fashion  Ultrasound guidance: no  Medications administered: 48 mg hylan 48 MG/6ML    Patient tolerance: patient tolerated the procedure well with no immediate complications  Dressing:  Sterile dressing applied        F/u 6 months

## 2021-02-23 ENCOUNTER — HOSPITAL ENCOUNTER (INPATIENT)
Facility: HOSPITAL | Age: 86
LOS: 3 days | Discharge: HOME HEALTH | DRG: 177 | End: 2021-02-26
Attending: EMERGENCY MEDICINE | Admitting: STUDENT IN AN ORGANIZED HEALTH CARE EDUCATION/TRAINING PROGRAM
Payer: MEDICARE

## 2021-02-23 ENCOUNTER — APPOINTMENT (EMERGENCY)
Dept: CT IMAGING | Facility: HOSPITAL | Age: 86
DRG: 177 | End: 2021-02-23
Payer: MEDICARE

## 2021-02-23 DIAGNOSIS — J96.01 ACUTE RESPIRATORY FAILURE WITH HYPOXIA (HCC): ICD-10-CM

## 2021-02-23 DIAGNOSIS — J12.82 PNEUMONIA DUE TO COVID-19 VIRUS: Primary | ICD-10-CM

## 2021-02-23 DIAGNOSIS — U07.1 PNEUMONIA DUE TO COVID-19 VIRUS: Primary | ICD-10-CM

## 2021-02-23 DIAGNOSIS — J44.9 COPD (CHRONIC OBSTRUCTIVE PULMONARY DISEASE) (HCC): ICD-10-CM

## 2021-02-23 PROBLEM — E03.9 HYPOTHYROIDISM: Status: ACTIVE | Noted: 2021-02-23

## 2021-02-23 PROBLEM — R10.13 EPIGASTRIC PAIN: Status: ACTIVE | Noted: 2021-02-23

## 2021-02-23 LAB
ALBUMIN SERPL BCP-MCNC: 2.8 G/DL (ref 3.5–5)
ALP SERPL-CCNC: 76 U/L (ref 46–116)
ALT SERPL W P-5'-P-CCNC: 18 U/L (ref 12–78)
ANION GAP SERPL CALCULATED.3IONS-SCNC: 10 MMOL/L (ref 4–13)
AST SERPL W P-5'-P-CCNC: 24 U/L (ref 5–45)
ATRIAL RATE: 73 BPM
BASOPHILS # BLD AUTO: 0 THOUSANDS/ΜL (ref 0–0.1)
BASOPHILS NFR BLD AUTO: 0 % (ref 0–1)
BILIRUB SERPL-MCNC: 0.4 MG/DL (ref 0.2–1)
BUN SERPL-MCNC: 18 MG/DL (ref 5–25)
CALCIUM ALBUM COR SERPL-MCNC: 9.5 MG/DL (ref 8.3–10.1)
CALCIUM SERPL-MCNC: 8.5 MG/DL (ref 8.3–10.1)
CHLORIDE SERPL-SCNC: 100 MMOL/L (ref 100–108)
CO2 SERPL-SCNC: 25 MMOL/L (ref 21–32)
CREAT SERPL-MCNC: 1.62 MG/DL (ref 0.6–1.3)
EOSINOPHIL # BLD AUTO: 0 THOUSAND/ΜL (ref 0–0.61)
EOSINOPHIL NFR BLD AUTO: 0 % (ref 0–6)
ERYTHROCYTE [DISTWIDTH] IN BLOOD BY AUTOMATED COUNT: 16.1 % (ref 11.6–15.1)
FLUAV RNA RESP QL NAA+PROBE: NEGATIVE
FLUBV RNA RESP QL NAA+PROBE: NEGATIVE
GFR SERPL CREATININE-BSD FRML MDRD: 29 ML/MIN/1.73SQ M
GLUCOSE SERPL-MCNC: 109 MG/DL (ref 65–140)
HCT VFR BLD AUTO: 40.2 % (ref 34.8–46.1)
HGB BLD-MCNC: 12.6 G/DL (ref 11.5–15.4)
IMM GRANULOCYTES # BLD AUTO: 0.02 THOUSAND/UL (ref 0–0.2)
IMM GRANULOCYTES NFR BLD AUTO: 1 % (ref 0–2)
LACTATE SERPL-SCNC: 1.2 MMOL/L (ref 0.5–2)
LIPASE SERPL-CCNC: 163 U/L (ref 73–393)
LYMPHOCYTES # BLD AUTO: 0.58 THOUSANDS/ΜL (ref 0.6–4.47)
LYMPHOCYTES NFR BLD AUTO: 16 % (ref 14–44)
MCH RBC QN AUTO: 26 PG (ref 26.8–34.3)
MCHC RBC AUTO-ENTMCNC: 31.3 G/DL (ref 31.4–37.4)
MCV RBC AUTO: 83 FL (ref 82–98)
MONOCYTES # BLD AUTO: 0.53 THOUSAND/ΜL (ref 0.17–1.22)
MONOCYTES NFR BLD AUTO: 14 % (ref 4–12)
NEUTROPHILS # BLD AUTO: 2.59 THOUSANDS/ΜL (ref 1.85–7.62)
NEUTS SEG NFR BLD AUTO: 69 % (ref 43–75)
NRBC BLD AUTO-RTO: 0 /100 WBCS
P AXIS: 40 DEGREES
PLATELET # BLD AUTO: 166 THOUSANDS/UL (ref 149–390)
PMV BLD AUTO: 11.8 FL (ref 8.9–12.7)
POTASSIUM SERPL-SCNC: 4 MMOL/L (ref 3.5–5.3)
PR INTERVAL: 140 MS
PROT SERPL-MCNC: 6.9 G/DL (ref 6.4–8.2)
QRS AXIS: 17 DEGREES
QRSD INTERVAL: 86 MS
QT INTERVAL: 372 MS
QTC INTERVAL: 409 MS
RBC # BLD AUTO: 4.84 MILLION/UL (ref 3.81–5.12)
RSV RNA RESP QL NAA+PROBE: NEGATIVE
SARS-COV-2 RNA RESP QL NAA+PROBE: POSITIVE
SODIUM SERPL-SCNC: 135 MMOL/L (ref 136–145)
T WAVE AXIS: 34 DEGREES
TROPONIN I SERPL-MCNC: <0.02 NG/ML
VENTRICULAR RATE: 73 BPM
WBC # BLD AUTO: 3.72 THOUSAND/UL (ref 4.31–10.16)

## 2021-02-23 PROCEDURE — 36415 COLL VENOUS BLD VENIPUNCTURE: CPT | Performed by: PHYSICIAN ASSISTANT

## 2021-02-23 PROCEDURE — G1004 CDSM NDSC: HCPCS

## 2021-02-23 PROCEDURE — 99285 EMERGENCY DEPT VISIT HI MDM: CPT

## 2021-02-23 PROCEDURE — 71250 CT THORAX DX C-: CPT

## 2021-02-23 PROCEDURE — 83690 ASSAY OF LIPASE: CPT | Performed by: PHYSICIAN ASSISTANT

## 2021-02-23 PROCEDURE — 93005 ELECTROCARDIOGRAM TRACING: CPT

## 2021-02-23 PROCEDURE — 83605 ASSAY OF LACTIC ACID: CPT | Performed by: PHYSICIAN ASSISTANT

## 2021-02-23 PROCEDURE — 84484 ASSAY OF TROPONIN QUANT: CPT | Performed by: PHYSICIAN ASSISTANT

## 2021-02-23 PROCEDURE — 99285 EMERGENCY DEPT VISIT HI MDM: CPT | Performed by: PHYSICIAN ASSISTANT

## 2021-02-23 PROCEDURE — XW033E5 INTRODUCTION OF REMDESIVIR ANTI-INFECTIVE INTO PERIPHERAL VEIN, PERCUTANEOUS APPROACH, NEW TECHNOLOGY GROUP 5: ICD-10-PCS | Performed by: STUDENT IN AN ORGANIZED HEALTH CARE EDUCATION/TRAINING PROGRAM

## 2021-02-23 PROCEDURE — 80053 COMPREHEN METABOLIC PANEL: CPT | Performed by: PHYSICIAN ASSISTANT

## 2021-02-23 PROCEDURE — 74176 CT ABD & PELVIS W/O CONTRAST: CPT

## 2021-02-23 PROCEDURE — 1123F ACP DISCUSS/DSCN MKR DOCD: CPT | Performed by: INTERNAL MEDICINE

## 2021-02-23 PROCEDURE — 93010 ELECTROCARDIOGRAM REPORT: CPT | Performed by: INTERNAL MEDICINE

## 2021-02-23 PROCEDURE — 0241U HB NFCT DS VIR RESP RNA 4 TRGT: CPT | Performed by: PHYSICIAN ASSISTANT

## 2021-02-23 PROCEDURE — 99223 1ST HOSP IP/OBS HIGH 75: CPT | Performed by: STUDENT IN AN ORGANIZED HEALTH CARE EDUCATION/TRAINING PROGRAM

## 2021-02-23 PROCEDURE — 85025 COMPLETE CBC W/AUTO DIFF WBC: CPT | Performed by: PHYSICIAN ASSISTANT

## 2021-02-23 RX ORDER — HEPARIN SODIUM 5000 [USP'U]/ML
7500 INJECTION, SOLUTION INTRAVENOUS; SUBCUTANEOUS EVERY 8 HOURS SCHEDULED
Status: DISCONTINUED | OUTPATIENT
Start: 2021-02-23 | End: 2021-02-26 | Stop reason: HOSPADM

## 2021-02-23 RX ORDER — ZINC SULFATE 50(220)MG
220 CAPSULE ORAL DAILY
Status: DISCONTINUED | OUTPATIENT
Start: 2021-02-23 | End: 2021-02-26 | Stop reason: HOSPADM

## 2021-02-23 RX ORDER — ONDANSETRON 2 MG/ML
4 INJECTION INTRAMUSCULAR; INTRAVENOUS EVERY 6 HOURS PRN
Status: DISCONTINUED | OUTPATIENT
Start: 2021-02-23 | End: 2021-02-26 | Stop reason: HOSPADM

## 2021-02-23 RX ORDER — MULTIVITAMIN/IRON/FOLIC ACID 18MG-0.4MG
1 TABLET ORAL DAILY
Status: DISCONTINUED | OUTPATIENT
Start: 2021-03-02 | End: 2021-02-26 | Stop reason: HOSPADM

## 2021-02-23 RX ORDER — GABAPENTIN 100 MG/1
200 CAPSULE ORAL
Status: DISCONTINUED | OUTPATIENT
Start: 2021-02-23 | End: 2021-02-26 | Stop reason: HOSPADM

## 2021-02-23 RX ORDER — ASPIRIN 81 MG/1
81 TABLET, CHEWABLE ORAL DAILY
Status: DISCONTINUED | OUTPATIENT
Start: 2021-02-24 | End: 2021-02-26 | Stop reason: HOSPADM

## 2021-02-23 RX ORDER — AMLODIPINE BESYLATE 5 MG/1
5 TABLET ORAL DAILY
Status: DISCONTINUED | OUTPATIENT
Start: 2021-02-24 | End: 2021-02-26 | Stop reason: HOSPADM

## 2021-02-23 RX ORDER — LISINOPRIL 20 MG/1
20 TABLET ORAL DAILY
Status: DISCONTINUED | OUTPATIENT
Start: 2021-02-23 | End: 2021-02-26

## 2021-02-23 RX ORDER — CITALOPRAM 20 MG/1
20 TABLET ORAL DAILY
Status: DISCONTINUED | OUTPATIENT
Start: 2021-02-24 | End: 2021-02-26 | Stop reason: HOSPADM

## 2021-02-23 RX ORDER — LEVOTHYROXINE SODIUM 0.1 MG/1
100 TABLET ORAL
Status: DISCONTINUED | OUTPATIENT
Start: 2021-02-24 | End: 2021-02-23

## 2021-02-23 RX ORDER — ATORVASTATIN CALCIUM 40 MG/1
40 TABLET, FILM COATED ORAL
Status: DISCONTINUED | OUTPATIENT
Start: 2021-02-24 | End: 2021-02-26 | Stop reason: HOSPADM

## 2021-02-23 RX ORDER — BUDESONIDE AND FORMOTEROL FUMARATE DIHYDRATE 160; 4.5 UG/1; UG/1
2 AEROSOL RESPIRATORY (INHALATION) 2 TIMES DAILY
Status: DISCONTINUED | OUTPATIENT
Start: 2021-02-23 | End: 2021-02-26 | Stop reason: HOSPADM

## 2021-02-23 RX ORDER — DOXYCYCLINE HYCLATE 100 MG/1
100 CAPSULE ORAL ONCE
Status: COMPLETED | OUTPATIENT
Start: 2021-02-23 | End: 2021-02-23

## 2021-02-23 RX ORDER — MELATONIN
2000 DAILY
Status: DISCONTINUED | OUTPATIENT
Start: 2021-02-23 | End: 2021-02-26 | Stop reason: HOSPADM

## 2021-02-23 RX ORDER — FAMOTIDINE 20 MG/1
10 TABLET, FILM COATED ORAL DAILY
Status: DISCONTINUED | OUTPATIENT
Start: 2021-02-23 | End: 2021-02-26 | Stop reason: HOSPADM

## 2021-02-23 RX ORDER — DEXAMETHASONE SODIUM PHOSPHATE 4 MG/ML
6 INJECTION, SOLUTION INTRA-ARTICULAR; INTRALESIONAL; INTRAMUSCULAR; INTRAVENOUS; SOFT TISSUE EVERY 24 HOURS
Status: DISCONTINUED | OUTPATIENT
Start: 2021-02-23 | End: 2021-02-26 | Stop reason: HOSPADM

## 2021-02-23 RX ORDER — LEVOTHYROXINE SODIUM 88 UG/1
88 TABLET ORAL
Status: DISCONTINUED | OUTPATIENT
Start: 2021-02-24 | End: 2021-02-26 | Stop reason: HOSPADM

## 2021-02-23 RX ORDER — ACETAMINOPHEN 325 MG/1
650 TABLET ORAL EVERY 6 HOURS PRN
Status: DISCONTINUED | OUTPATIENT
Start: 2021-02-23 | End: 2021-02-26 | Stop reason: HOSPADM

## 2021-02-23 RX ORDER — ASCORBIC ACID 500 MG
1000 TABLET ORAL EVERY 12 HOURS SCHEDULED
Status: DISCONTINUED | OUTPATIENT
Start: 2021-02-23 | End: 2021-02-26 | Stop reason: HOSPADM

## 2021-02-23 RX ORDER — METHYLPREDNISOLONE SODIUM SUCCINATE 40 MG/ML
40 INJECTION, POWDER, LYOPHILIZED, FOR SOLUTION INTRAMUSCULAR; INTRAVENOUS EVERY 24 HOURS
Status: DISCONTINUED | OUTPATIENT
Start: 2021-02-23 | End: 2021-02-23

## 2021-02-23 RX ADMIN — DEXAMETHASONE SODIUM PHOSPHATE 6 MG: 4 INJECTION, SOLUTION INTRAMUSCULAR; INTRAVENOUS at 14:01

## 2021-02-23 RX ADMIN — LISINOPRIL 20 MG: 20 TABLET ORAL at 13:58

## 2021-02-23 RX ADMIN — CEFTRIAXONE SODIUM 1000 MG: 10 INJECTION, POWDER, FOR SOLUTION INTRAVENOUS at 08:47

## 2021-02-23 RX ADMIN — FAMOTIDINE 10 MG: 20 TABLET ORAL at 13:58

## 2021-02-23 RX ADMIN — GABAPENTIN 200 MG: 100 CAPSULE ORAL at 21:13

## 2021-02-23 RX ADMIN — ZINC SULFATE 220 MG (50 MG) CAPSULE 220 MG: CAPSULE at 13:58

## 2021-02-23 RX ADMIN — OXYCODONE HYDROCHLORIDE AND ACETAMINOPHEN 1000 MG: 500 TABLET ORAL at 13:58

## 2021-02-23 RX ADMIN — OXYCODONE HYDROCHLORIDE AND ACETAMINOPHEN 1000 MG: 500 TABLET ORAL at 21:17

## 2021-02-23 RX ADMIN — REMDESIVIR 200 MG: 100 INJECTION, POWDER, LYOPHILIZED, FOR SOLUTION INTRAVENOUS at 14:35

## 2021-02-23 RX ADMIN — DOXYCYCLINE 100 MG: 100 CAPSULE ORAL at 08:47

## 2021-02-23 RX ADMIN — HEPARIN SODIUM 7500 UNITS: 5000 INJECTION INTRAVENOUS; SUBCUTANEOUS at 21:13

## 2021-02-23 RX ADMIN — HEPARIN SODIUM 7500 UNITS: 5000 INJECTION INTRAVENOUS; SUBCUTANEOUS at 14:02

## 2021-02-23 RX ADMIN — BUDESONIDE AND FORMOTEROL FUMARATE DIHYDRATE 2 PUFF: 160; 4.5 AEROSOL RESPIRATORY (INHALATION) at 21:13

## 2021-02-23 RX ADMIN — Medication 2000 UNITS: at 13:57

## 2021-02-23 NOTE — ASSESSMENT & PLAN NOTE
History of CKD, baseline creatinine at 1 3  Creatinine was 1 62 on presentation  Patient was on ibuprofen for knee pains  Hold on ibuprofen  In the setting of COVID-19 pneumonia, will hold fluids    Monitor CMP daily

## 2021-02-23 NOTE — ED NOTES
Post Fall Note    Date of Fall: 02/23/21  Observer/Reported time of Fall: 1130  Name of Provider Notified: SLIM  Time Provider Notified: 1218  Assessment of Patient Injury: No injury noted  Post Fall Interventions: Physician notified; Fall risk precautions implemented/maitained;Comforts rounds continued; Need for additional safety measures intiated if necessary;Circumstances of fall reviewed and documented  Family/Next of kin notified?: No      Brief Description of Events  Patient experienced unwitnessed fall, found on the ground by ED TechMil  Pt was evaluated by ED RNCindy Room for immediate injuries  No obvious injuries noted or complaints of pain by pt  Pt denied hitting head  SLIM was notified by this RN and ED Tech  This RN was in another pts room at time of incident and was unable to answer call bell  Pt reported understanding that she was previously instructed to wait for assistance to get OOB but states she needed to use the bathroom  Call bell was utilized, side rails were up x2 at time of incident  Will continue to monitor pt  Last Recorded Vitals  Blood pressure 141/62, pulse 80, temperature 98 5 °F (36 9 °C), temperature source Oral, resp  rate 20, weight 96 9 kg (213 lb 10 oz), SpO2 95 %, not currently breastfeeding  Active Problems:    * No active hospital problems   Kelly Ellison, RN  55/40/71 [id number]

## 2021-02-23 NOTE — ED NOTES
Pt rang bell for episode of vomiting, pt did not request nausea medications at this time  Would like to rest  Lights dimmed, linens changed and door closed for pt to rest  Call bell remains within reach        Caren Mariscal RN  62/43/86 1448

## 2021-02-23 NOTE — ASSESSMENT & PLAN NOTE
Patient presents with shortness of breath and fatigue, associated with nausea and vomiting, loose to watery diarrhea  Patient denies cough, fever, chills  Patient is not on oxygen at home  Patient lives at home and denies any COVID contacts  COVID-19 test was positive at ED  Leukopenia  CT chest showed Bilateral multi lobar pneumonia compatible with confirmed Covid 19 infection    Patient currently needs 2 L of oxygen  --treat patient with COVID-19 mild pathway  --follow-up procalcitonin, D-dimer, ferritin, and CRP

## 2021-02-23 NOTE — ED NOTES
LYLY notified in person regarding pt fall, provider in to evaluate pt at this time       Ada Lauren RN  27/47/07 121

## 2021-02-23 NOTE — ASSESSMENT & PLAN NOTE
History of COPD on Symbicort  Not on oxygen at home  Currently patient is on 2 L of nasal cannula, SpO2 94%  PE:  Reduced breath sound bilaterally, no wheezing or crackles  Treat patient with Decadron per COVID-19 treatment pathway

## 2021-02-23 NOTE — H&P
H&P- Elder Reusing 1935, 82 y o  female MRN: [id number]    Unit/Bed#: ED 06 Encounter: [id number]    Primary Care Provider: Jana Delarosa MD   Date and time admitted to hospital: 2/23/2021  5:48 AM        Epigastric pain  Assessment & Plan  Patient complained of intermittent epigastric pain, non-radiating  Chart review showed patient have been having abdominal pain for a while  Had history of ulcer  Troponin was negative at ED  Was on omeprazole 20 mg p o  Q d  at home  Will treat with famotidine per COVID-19 treatment pathway  Outpatient GI follow-up    Hypothyroidism  Assessment & Plan  Continue levothyroxine    COPD (chronic obstructive pulmonary disease) ([facility] )  Assessment & Plan  History of COPD on Symbicort  Not on oxygen at home  Currently patient is on 2 L of nasal cannula, SpO2 94%  PE:  Reduced breath sound bilaterally, no wheezing or crackles  Treat patient with Decadron per COVID-19 treatment pathway        KRISTYN (acute kidney injury) ([facility] )  Assessment & Plan  History of CKD, baseline creatinine at 1 3  Creatinine was 1 62 on presentation  Patient was on ibuprofen for knee pains  Hold on ibuprofen  In the setting of COVID-19 pneumonia, will hold fluids  Monitor CMP daily    Essential hypertension  Assessment & Plan  Continue home medication amlodipine and lisinopril    * Pneumonia due to COVID-19 virus  Assessment & Plan  Patient presents with shortness of breath and fatigue, associated with nausea and vomiting, loose to watery diarrhea  Patient denies cough, fever, chills  Patient is not on oxygen at home  Patient lives at home and denies any COVID contacts  COVID-19 test was positive at ED  Leukopenia  CT chest showed Bilateral multi lobar pneumonia compatible with confirmed Covid 19 infection    Patient currently needs 2 L of oxygen  --treat patient with COVID-19 mild pathway  --follow-up procalcitonin, D-dimer, ferritin, and CRP        VTE Prophylaxis: Heparin  / sequential compression device   Code Status:  Level 1 full code  POLST: There is no POLST form on file for this patient (pre-hospital)  Discussion with family:  Patient    Anticipated Length of Stay:  Patient will be admitted on an Inpatient basis with an anticipated length of stay of  2 midnights  Justification for Hospital Stay:  COVID-19 pneumonia        Chief Complaint:   Shortness of breath    History of Present Illness:    Gean Chavez is a 82 y o  female with past medical history of COPD, hypertension and hypothyroidism presents with shortness of breath  Patient stated that she felt short of breath started yesterday, associated with nausea and epigastric pain  Patient also complained of loose to watery diarrhea for 2-3 days  Patient also f felt weak and tired  Patient denies fever, chills, cough, dysuria, body ache, loss of taste  Patient denies COVID-19 contacts  At ED, COVID-19 test was positive  Leukopenia  Creatinine 1 62  CT chest showed  Bilateral multi lobar pneumonia compatible with confirmed Covid 19 infection  Review of Systems:    Review of Systems   Constitutional: Positive for fatigue  Negative for chills and fever  HENT: Negative for congestion, rhinorrhea, sneezing and sore throat  Eyes: Negative for pain and discharge  Respiratory: Positive for shortness of breath  Negative for cough, chest tightness and wheezing  Cardiovascular: Negative for chest pain and leg swelling  Gastrointestinal: Positive for diarrhea, nausea and vomiting  Negative for abdominal pain  Endocrine: Negative for polydipsia, polyphagia and polyuria  Genitourinary: Negative for flank pain, frequency and urgency  Musculoskeletal: Negative for arthralgias, back pain and joint swelling  Skin: Negative for color change and pallor  Neurological: Negative for dizziness, weakness, light-headedness and headaches  Psychiatric/Behavioral: Negative for agitation and confusion         Past Medical and Surgical History:     Past Medical History:   Diagnosis Date    23-polyvalent pneumococcal polysaccharide vaccine contraindicated as received shingles vaccine within last 4 weeks     October 24, 2019    Angina p[facility]is ([facility] )     Arthritis     Asthma     COPD (chronic obstructive pulmonary disease) ([facility] )     Deaf, right     Disease of thyroid gland     hypothyroidism    GERD (gastroesophageal reflux disease)     Glaucoma     Hiatal hernia     Hyperlipidemia     Hypertension     Renal disorder        Past Surgical History:   Procedure Laterality Date    ABDOMINAL SURGERY      exploratory surgery    APPENDECTOMY      BREAST SURGERY      Bilateral biopsys, benign    COLONOSCOPY N/A 8/14/2018    Procedure: COLONOSCOPY;  Surgeon: Itzel Preciado MD;  Location: [facility]; Service: Gastroenterology    EAR SURGERY      EYE SURGERY      cataracts     HEMORRHOID SURGERY      NOSE SURGERY      rhinoplasty    SHOULDER SURGERY  2008    left     TONSILLECTOMY      TRIGGER FINGER RELEASE Left        Meds/Allergies:    Prior to Admission medications    Medication Sig Start Date End Date Taking?  Authorizing Provider   amLODIPine (NORVASC) 5 mg tablet Take by mouth 3/21/14  Yes Historical Provider, MD   aspirin 81 MG tablet Take by mouth   Yes Historical Provider, MD   atorvastatin (LIPITOR) 40 mg tablet atorvastatin 40 mg tablet   TAKE 1 TABLET BY MOUTH EVERY DAY AT NIGHT 11/25/20  Yes Historical Provider, MD   budesonide-formoterol (Symbicort) 160-4 5 mcg/act inhaler Inhale 2 puffs 2 (two) times a day 2/11/21 2/11/22 Yes Historical Provider, MD   citalopram (CeleXA) 20 mg tablet citalopram 20 mg tablet 10/10/13  Yes Historical Provider, MD   gabapentin (NEURONTIN) 100 mg capsule Take 100 mg by mouth 4 (four) times a day  11/4/16  Yes Historical Provider, MD   levothyroxine 100 mcg tablet Take by mouth 3/21/14  Yes Historical Provider, MD   levothyroxine 88 mcg tablet Take 88 mcg by mouth daily 1/17/21  Yes Historical Provider, MD   lisinopril (ZESTRIL) 20 mg tablet Take by mouth 12/20/13  Yes Historical Provider, MD   omeprazole (PriLOSEC) 20 mg delayed release capsule Take 20 mg by mouth 2 (two) times a day   Yes Historical Provider, MD   acetaminophen (TYLENOL) 325 mg tablet Take 3 tablets (975 mg total) by mouth every 8 (eight) hours  Patient not taking: Reported on 2/23/2021 9/16/19   DANIELA Stallings   alendronate (FOSAMAX) 35 mg tablet alendronate 35 mg tablet   TAKE 1 TABLET BY MOUTH EVERY WEEK IN THE MORNING    Historical Provider, MD   azelastine (OPTIVAR) 0 05 % ophthalmic solution azelastine 0 05 % eye drops   INSTILL 1 DROP INTO BOTH EYES TWICE A DAY 6/8/20   Historical Provider, MD   clotrimazole-betamethasone (LOTRISONE) 1-0 05 % cream clotrimazole-betamethasone 1 %-0 05 % topical cream   APPLY TO GROIN TWICE A DAY FOR TWO WEEKS    Historical Provider, MD   famotidine (PEPCID) 20 mg tablet famotidine 20 mg tablet    Historical Provider, MD   fluticasone-salmeterol (ADVAIR DISKUS) 250-50 mcg/dose inhaler as needed     Historical Provider, MD   ibuprofen (MOTRIN) 600 mg tablet 1 tablet every 24 hours    Historical Provider, MD   nystatin (MYCOSTATIN) ointment Nystatin 421073 UNIT/GM External Ointment  APPLY INCH  PRN   Refills: 0    Active    Historical Provider, MD     I have reviewed home medications with patient personally  Allergies: Allergies   Allergen Reactions    Erythromycin Anaphylaxis, Rash and Tremor    Pravastatin Other (See Comments)     Pt  Not sure  Social History:     Marital Status:     Occupation:  Retired  Patient Pre-hospital Living Situation:  With her son  Patient Pre-hospital Level of Mobility:  Ambulatory with a walker  Patient Pre-hospital Diet Restrictions:  None  Substance Use History:   Social History     Substance and Sexual Activity   Alcohol Use Never    Frequency: Never     Social History     Tobacco Use   Smoking Status Former Smoker    Quit date: 1990    Years since quittin 5   Smokeless Tobacco Never Used     Social History     Substance and Sexual Activity   Drug Use Never       Family History:    Family History   Problem Relation Age of Onset    Heart disease Mother     Heart disease Father     Diabetes Brother     Cancer Brother     Cancer Son         lymphoma       Physical Exam:     Vitals:   Blood Pressure: 125/73 (21 1358)  Pulse: 80 (21 1141)  Temperature: 98 5 °F (36 9 °C) (21 0554)  Temp Source: Oral (21 0554)  Respirations: 20 (21 1141)  Weight - Scale: 96 9 kg (213 lb 10 oz) (21 0549)  SpO2: 95 % (21 1141)    Physical Exam  Constitutional:       Appearance: She is obese  HENT:      Head: Normocephalic  Eyes:      Pupils: Pupils are equal, round, and reactive to light  Neck:      Musculoskeletal: Normal range of motion  Cardiovascular:      Rate and Rhythm: Normal rate and regular rhythm  Heart sounds: No murmur  Pulmonary:      Effort: Pulmonary effort is normal  No respiratory distress  Breath sounds: Examination of the right-lower field reveals decreased breath sounds  Examination of the left-lower field reveals decreased breath sounds  Decreased breath sounds present  No wheezing or rales  Comments: On 2 L of nasal cannula  Abdominal:      Palpations: Abdomen is soft  Tenderness: There is abdominal tenderness (Epigastric )  Musculoskeletal: Normal range of motion  General: No swelling  Right lower leg: No edema  Left lower leg: No edema  Skin:     General: Skin is warm  Neurological:      Mental Status: She is alert and oriented to person, place, and time  Psychiatric:         Mood and Affect: Mood normal              Additional Data:     Lab Results: I have personally reviewed pertinent reports        Results from last 7 days   Lab Units 21  0624   WBC Thousand/uL 3 72*   HEMOGLOBIN g/dL 12 6   HEMATOCRIT % 40 2 PLATELETS Thousands/uL 166   NEUTROS PCT % 69   LYMPHS PCT % 16   MONOS PCT % 14*   EOS PCT % 0     Results from last 7 days   Lab Units 02/23/21  0624   SODIUM mmol/L 135*   POTASSIUM mmol/L 4 0   CHLORIDE mmol/L 100   CO2 mmol/L 25   BUN mg/dL 18   CREATININE mg/dL 1 62*   ANION GAP mmol/L 10   CALCIUM mg/dL 8 5   ALBUMIN g/dL 2 8*   TOTAL BILIRUBIN mg/dL 0 40   ALK PHOS U/L 76   ALT U/L 18   AST U/L 24   GLUCOSE RANDOM mg/dL 109                 Results from last 7 days   Lab Units 02/23/21  0624   LACTIC ACID mmol/L 1 2       Imaging: I have personally reviewed pertinent reports  CT chest abdomen pelvis wo contrast   Final Result by Jori Zavala MD (02/23 1380)      No evidence of aortic aneurysm  Nondiagnostic evaluation for possible dissection in the absence of intravenous contrast enhancement  Bilateral multi lobar pneumonia compatible with confirmed Covid 19 infection  Colonic diverticulosis without diverticulitis  Workstation performed: [id number]             EKG, Pathology, and Other Studies Reviewed on Admission:   · EKG:  Normal sinus rhythm    Allscripts / Epic Records Reviewed: Yes     ** Please Note: This note has been constructed using a voice recognition system   **

## 2021-02-23 NOTE — PLAN OF CARE
Problem: PAIN - ADULT  Goal: Verbalizes/displays adequate comfort level or baseline comfort level  Description: Interventions:  - Encourage patient to monitor pain and request assistance  - Assess pain using appropriate pain scale  - Administer analgesics based on type and severity of pain and evaluate response  - Implement non-pharmacological measures as appropriate and evaluate response  - Consider cultural and social influences on pain and pain management  - Notify physician/advanced practitioner if interventions unsuccessful or patient reports new pain  Outcome: Not Progressing     Problem: INFECTION - ADULT  Goal: Absence or prevention of progression during hospitalization  Description: INTERVENTIONS:  - Assess and monitor for signs and symptoms of infection  - Monitor lab/diagnostic results  - Monitor all insertion sites, i e  indwelling lines, tubes, and drains  - Monitor endotracheal if appropriate and nasal secretions for changes in amount and color  - [place] appropriate cooling/warming therapies per order  - Administer medications as ordered  - Instruct and encourage patient and family to use good hand hygiene technique  - Identify and instruct in appropriate isolation precautions for identified infection/condition  Outcome: Not Progressing  Goal: Absence of fever/infection during neutropenic period  Description: INTERVENTIONS:  - Monitor WBC    Outcome: Not Progressing     Problem: SAFETY ADULT  Goal: Patient will remain free of falls  Description: INTERVENTIONS:  - Assess patient frequently for physical needs  -  Identify cognitive and physical deficits and behaviors that affect risk of falls    -  [place] fall precautions as indicated by assessment   - Educate patient/family on patient safety including physical limitations  - Instruct patient to call for assistance with activity based on assessment  - Modify environment to reduce risk of injury  - Consider OT/PT consult to assist with strengthening/mobility  Outcome: Not Progressing  Goal: Maintain or return to baseline ADL function  Description: INTERVENTIONS:  -  Assess patient's ability to carry out ADLs; assess patient's baseline for ADL function and identify physical deficits which impact ability to perform ADLs (bathing, care of mouth/teeth, toileting, grooming, dressing, etc )  - Assess/evaluate cause of self-care deficits   - Assess range of motion  - Assess patient's mobility; develop plan if impaired  - Assess patient's need for assistive devices and provide as appropriate  - Encourage maximum independence but intervene and supervise when necessary  - Involve family in performance of ADLs  - Assess for home care needs following discharge   - Consider OT consult to assist with ADL evaluation and planning for discharge  - Provide patient education as appropriate  Outcome: Not Progressing  Goal: Maintain or return mobility status to optimal level  Description: INTERVENTIONS:  - Assess patient's baseline mobility status (ambulation, transfers, stairs, etc )    - Identify cognitive and physical deficits and behaviors that affect mobility  - Identify mobility aids required to assist with transfers and/or ambulation (gait belt, sit-to-stand, lift, walker, cane, etc )  - [place] fall precautions as indicated by assessment  - Record patient progress and toleration of activity level on Mobility SBAR; progress patient to next Phase/Stage  - Instruct patient to call for assistance with activity based on assessment  - Consider rehabilitation consult to assist with strengthening/weightbearing, etc   Outcome: Not Progressing     Problem: DISCHARGE PLANNING  Goal: Discharge to home or other facility with appropriate resources  Description: INTERVENTIONS:  - Identify barriers to discharge w/patient and caregiver  - Arrange for needed discharge resources and transportation as appropriate  - Identify discharge learning needs (meds, wound care, etc )  - Arrange for interpretive services to assist at discharge as needed  - Refer to Case Management Department for coordinating discharge planning if the patient needs post-hospital services based on physician/advanced practitioner order or complex needs related to functional status, cognitive ability, or social support system  Outcome: Not Progressing     Problem: Knowledge Deficit  Goal: Patient/family/caregiver demonstrates understanding of disease process, treatment plan, medications, and discharge instructions  Description: Complete learning assessment and assess knowledge base  Interventions:  - Provide teaching at level of understanding  - Provide teaching via preferred learning methods  Outcome: Not Progressing     Problem: Potential for Falls  Goal: Patient will remain free of falls  Description: INTERVENTIONS:  - Assess patient frequently for physical needs  -  Identify cognitive and physical deficits and behaviors that affect risk of falls    -  [place] fall precautions as indicated by assessment   - Educate patient/family on patient safety including physical limitations  - Instruct patient to call for assistance with activity based on assessment  - Modify environment to reduce risk of injury  - Consider OT/PT consult to assist with strengthening/mobility  Outcome: Not Progressing

## 2021-02-23 NOTE — ASSESSMENT & PLAN NOTE
Patient complained of intermittent epigastric pain, non-radiating  Chart review showed patient have been having abdominal pain for a while  Had history of ulcer    Troponin was negative at ED  Was on omeprazole 20 mg p o  Q d  at home  Will treat with famotidine per COVID-19 treatment pathway  Outpatient GI follow-up

## 2021-02-24 LAB
ALBUMIN SERPL BCP-MCNC: 2.8 G/DL (ref 3.5–5)
ALP SERPL-CCNC: 78 U/L (ref 46–116)
ALT SERPL W P-5'-P-CCNC: 19 U/L (ref 12–78)
ANION GAP SERPL CALCULATED.3IONS-SCNC: 14 MMOL/L (ref 4–13)
AST SERPL W P-5'-P-CCNC: 25 U/L (ref 5–45)
BASOPHILS # BLD AUTO: 0 THOUSANDS/ΜL (ref 0–0.1)
BASOPHILS NFR BLD AUTO: 0 % (ref 0–1)
BILIRUB SERPL-MCNC: 0.3 MG/DL (ref 0.2–1)
BUN SERPL-MCNC: 27 MG/DL (ref 5–25)
CALCIUM ALBUM COR SERPL-MCNC: 9.6 MG/DL (ref 8.3–10.1)
CALCIUM SERPL-MCNC: 8.6 MG/DL (ref 8.3–10.1)
CHLORIDE SERPL-SCNC: 101 MMOL/L (ref 100–108)
CO2 SERPL-SCNC: 21 MMOL/L (ref 21–32)
CREAT SERPL-MCNC: 1.66 MG/DL (ref 0.6–1.3)
CRP SERPL QL: 38.3 MG/L
D DIMER PPP FEU-MCNC: 1.24 UG/ML FEU
EOSINOPHIL # BLD AUTO: 0 THOUSAND/ΜL (ref 0–0.61)
EOSINOPHIL NFR BLD AUTO: 0 % (ref 0–6)
ERYTHROCYTE [DISTWIDTH] IN BLOOD BY AUTOMATED COUNT: 16.1 % (ref 11.6–15.1)
FERRITIN SERPL-MCNC: 334 NG/ML (ref 8–388)
GFR SERPL CREATININE-BSD FRML MDRD: 28 ML/MIN/1.73SQ M
GLUCOSE SERPL-MCNC: 114 MG/DL (ref 65–140)
HCT VFR BLD AUTO: 43.6 % (ref 34.8–46.1)
HGB BLD-MCNC: 13.6 G/DL (ref 11.5–15.4)
IMM GRANULOCYTES # BLD AUTO: 0.01 THOUSAND/UL (ref 0–0.2)
IMM GRANULOCYTES NFR BLD AUTO: 0 % (ref 0–2)
LYMPHOCYTES # BLD AUTO: 0.57 THOUSANDS/ΜL (ref 0.6–4.47)
LYMPHOCYTES NFR BLD AUTO: 19 % (ref 14–44)
MCH RBC QN AUTO: 25.4 PG (ref 26.8–34.3)
MCHC RBC AUTO-ENTMCNC: 31.2 G/DL (ref 31.4–37.4)
MCV RBC AUTO: 82 FL (ref 82–98)
MONOCYTES # BLD AUTO: 0.43 THOUSAND/ΜL (ref 0.17–1.22)
MONOCYTES NFR BLD AUTO: 14 % (ref 4–12)
NEUTROPHILS # BLD AUTO: 1.98 THOUSANDS/ΜL (ref 1.85–7.62)
NEUTS SEG NFR BLD AUTO: 67 % (ref 43–75)
NRBC BLD AUTO-RTO: 0 /100 WBCS
PLATELET # BLD AUTO: 201 THOUSANDS/UL (ref 149–390)
PMV BLD AUTO: 12.3 FL (ref 8.9–12.7)
POTASSIUM SERPL-SCNC: 4.4 MMOL/L (ref 3.5–5.3)
PROCALCITONIN SERPL-MCNC: 0.06 NG/ML
PROT SERPL-MCNC: 7.1 G/DL (ref 6.4–8.2)
RBC # BLD AUTO: 5.35 MILLION/UL (ref 3.81–5.12)
SODIUM SERPL-SCNC: 136 MMOL/L (ref 136–145)
WBC # BLD AUTO: 2.99 THOUSAND/UL (ref 4.31–10.16)

## 2021-02-24 PROCEDURE — 85379 FIBRIN DEGRADATION QUANT: CPT | Performed by: INTERNAL MEDICINE

## 2021-02-24 PROCEDURE — 86140 C-REACTIVE PROTEIN: CPT | Performed by: INTERNAL MEDICINE

## 2021-02-24 PROCEDURE — 84145 PROCALCITONIN (PCT): CPT | Performed by: INTERNAL MEDICINE

## 2021-02-24 PROCEDURE — 82728 ASSAY OF FERRITIN: CPT | Performed by: INTERNAL MEDICINE

## 2021-02-24 PROCEDURE — 99232 SBSQ HOSP IP/OBS MODERATE 35: CPT | Performed by: STUDENT IN AN ORGANIZED HEALTH CARE EDUCATION/TRAINING PROGRAM

## 2021-02-24 PROCEDURE — 85025 COMPLETE CBC W/AUTO DIFF WBC: CPT | Performed by: INTERNAL MEDICINE

## 2021-02-24 PROCEDURE — 80053 COMPREHEN METABOLIC PANEL: CPT | Performed by: INTERNAL MEDICINE

## 2021-02-24 RX ADMIN — REMDESIVIR 100 MG: 100 INJECTION, POWDER, LYOPHILIZED, FOR SOLUTION INTRAVENOUS at 14:05

## 2021-02-24 RX ADMIN — ATORVASTATIN CALCIUM 40 MG: 40 TABLET, FILM COATED ORAL at 17:32

## 2021-02-24 RX ADMIN — LEVOTHYROXINE SODIUM 88 MCG: 88 TABLET ORAL at 05:37

## 2021-02-24 RX ADMIN — OXYCODONE HYDROCHLORIDE AND ACETAMINOPHEN 1000 MG: 500 TABLET ORAL at 22:48

## 2021-02-24 RX ADMIN — HEPARIN SODIUM 7500 UNITS: 5000 INJECTION INTRAVENOUS; SUBCUTANEOUS at 22:48

## 2021-02-24 RX ADMIN — OXYCODONE HYDROCHLORIDE AND ACETAMINOPHEN 1000 MG: 500 TABLET ORAL at 08:40

## 2021-02-24 RX ADMIN — ZINC SULFATE 220 MG (50 MG) CAPSULE 220 MG: CAPSULE at 08:40

## 2021-02-24 RX ADMIN — FAMOTIDINE 10 MG: 20 TABLET ORAL at 08:40

## 2021-02-24 RX ADMIN — AMLODIPINE BESYLATE 5 MG: 5 TABLET ORAL at 08:40

## 2021-02-24 RX ADMIN — BUDESONIDE AND FORMOTEROL FUMARATE DIHYDRATE 2 PUFF: 160; 4.5 AEROSOL RESPIRATORY (INHALATION) at 17:33

## 2021-02-24 RX ADMIN — BUDESONIDE AND FORMOTEROL FUMARATE DIHYDRATE 2 PUFF: 160; 4.5 AEROSOL RESPIRATORY (INHALATION) at 08:41

## 2021-02-24 RX ADMIN — Medication 2000 UNITS: at 08:40

## 2021-02-24 RX ADMIN — CITALOPRAM HYDROBROMIDE 20 MG: 20 TABLET ORAL at 08:40

## 2021-02-24 RX ADMIN — ASPIRIN 81 MG: 81 TABLET, CHEWABLE ORAL at 08:40

## 2021-02-24 RX ADMIN — HEPARIN SODIUM 7500 UNITS: 5000 INJECTION INTRAVENOUS; SUBCUTANEOUS at 05:37

## 2021-02-24 RX ADMIN — GABAPENTIN 200 MG: 100 CAPSULE ORAL at 22:49

## 2021-02-24 RX ADMIN — HEPARIN SODIUM 7500 UNITS: 5000 INJECTION INTRAVENOUS; SUBCUTANEOUS at 14:05

## 2021-02-24 RX ADMIN — LISINOPRIL 20 MG: 20 TABLET ORAL at 08:40

## 2021-02-24 RX ADMIN — DEXAMETHASONE SODIUM PHOSPHATE 6 MG: 4 INJECTION, SOLUTION INTRAMUSCULAR; INTRAVENOUS at 14:05

## 2021-02-24 NOTE — ED PROVIDER NOTES
History  Chief Complaint   Patient presents with    Shortness of Breath     sob and gen not feeling well, possible covid exposure      Patient is an 53-year-old female presents to the emergency department with complaints of chest pain, shortness of breath, nausea, diarrhea, abdominal pain  Patient states symptoms began 2 days ago  She denies any fevers, chills  States that she feels generally unwell  She may have had a possible cover exposure  She denies any diaphoresis  Prior to Admission Medications   Prescriptions Last Dose Informant Patient Reported?  Taking?   acetaminophen (TYLENOL) 325 mg tablet Not Taking at Unknown time Self No No   Sig: Take 3 tablets (975 mg total) by mouth every 8 (eight) hours   Patient not taking: Reported on 2/23/2021   alendronate (FOSAMAX) 35 mg tablet Not Taking at Unknown time Self Yes No   Sig: alendronate 35 mg tablet   TAKE 1 TABLET BY MOUTH EVERY WEEK IN THE MORNING   amLODIPine (NORVASC) 5 mg tablet 2/23/2021 at Unknown time Self Yes Yes   Sig: Take by mouth   aspirin 81 MG tablet 2/23/2021 at Unknown time Self Yes Yes   Sig: Take by mouth   atorvastatin (LIPITOR) 40 mg tablet 2/23/2021 at Unknown time Self Yes Yes   Sig: atorvastatin 40 mg tablet   TAKE 1 TABLET BY MOUTH EVERY DAY AT NIGHT   azelastine (OPTIVAR) 0 05 % ophthalmic solution Not Taking at Unknown time Self Yes No   Sig: azelastine 0 05 % eye drops   INSTILL 1 DROP INTO BOTH EYES TWICE A DAY   budesonide-formoterol (Symbicort) 160-4 5 mcg/act inhaler 2/22/2021 at Unknown time  Yes Yes   Sig: Inhale 2 puffs 2 (two) times a day   citalopram (CeleXA) 20 mg tablet 2/22/2021 at Unknown time Self Yes Yes   Sig: citalopram 20 mg tablet   clotrimazole-betamethasone (LOTRISONE) 1-0 05 % cream Not Taking at Unknown time Self Yes No   Sig: clotrimazole-betamethasone 1 %-0 05 % topical cream   APPLY TO GROIN TWICE A DAY FOR TWO WEEKS   famotidine (PEPCID) 20 mg tablet Not Taking at Unknown time Self Yes No Sig: famotidine 20 mg tablet   fluticasone-salmeterol (ADVAIR DISKUS) 250-50 mcg/dose inhaler Not Taking at Unknown time Self Yes No   Sig: as needed    gabapentin (NEURONTIN) 100 mg capsule 2021 at Unknown time Self Yes Yes   Sig: Take 100 mg by mouth 4 (four) times a day    ibuprofen (MOTRIN) 600 mg tablet  Self Yes No   Si tablet every 24 hours   levothyroxine 100 mcg tablet 2021 at Unknown time Self Yes Yes   Sig: Take by mouth   levothyroxine 88 mcg tablet 2021 at Unknown time Self Yes Yes   Sig: Take 88 mcg by mouth daily   lisinopril (ZESTRIL) 20 mg tablet 2021 at Unknown time Self Yes Yes   Sig: Take by mouth   nystatin (MYCOSTATIN) ointment Not Taking at Unknown time Self Yes No   Sig: Nystatin 619811 UNIT/GM External Ointment  APPLY INCH  PRN   Refills: 0    Active   omeprazole (PriLOSEC) 20 mg delayed release capsule 2021 at Unknown time Self Yes Yes   Sig: Take 20 mg by mouth 2 (two) times a day      Facility-Administered Medications: None       Past Medical History:   Diagnosis Date    23-polyvalent pneumococcal polysaccharide vaccine contraindicated as received shingles vaccine within last 4 weeks     2019    Angina pectoris ([facility] [address] )     Arthritis     Asthma     COPD (chronic obstructive pulmonary disease) ([facility] [address] [facility]     Deaf, right     Disease of thyroid gland     hypothyroidism    GERD (gastroesophageal reflux disease)     Glaucoma     Hiatal hernia     Hyperlipidemia     Hypertension     Renal disorder        Past Surgical History:   Procedure Laterality Date    ABDOMINAL SURGERY      exploratory surgery    APPENDECTOMY      BREAST SURGERY      Bilateral biopsys, benign    COLONOSCOPY N/A 2018    Procedure: COLONOSCOPY;  Surgeon: Marta Carpenter MD;  Location: [facility];   Service: Gastroenterology    EAR SURGERY      EYE SURGERY      cataracts     HEMORRHOID SURGERY      NOSE SURGERY      rhinoplasty    SHOULDER SURGERY      left     TONSILLECTOMY      TRIGGER FINGER RELEASE Left        Family History   Problem Relation Age of Onset    Heart disease Mother     Heart disease Father     Diabetes Brother     Cancer Brother     Cancer Son         lymphoma     I have reviewed and agree with the history as documented  E-Cigarette/Vaping    E-Cigarette Use Never User      E-Cigarette/Vaping Substances    Nicotine No     THC No     CBD No     Flavoring No     Other No     Unknown No      Social History     Tobacco Use    Smoking status: Former Smoker     Quit date: 1990     Years since quittin 5    Smokeless tobacco: Never Used   Substance Use Topics    Alcohol use: Never     Frequency: Never    Drug use: Never       Review of Systems   Constitutional: Negative for chills, diaphoresis and fever  HENT: Negative for congestion  Respiratory: Positive for cough and shortness of breath  Cardiovascular: Positive for chest pain  Gastrointestinal: Negative for abdominal pain  Neurological: Positive for weakness  Negative for dizziness  All other systems reviewed and are negative  Physical Exam  Physical Exam  Vitals signs reviewed  Constitutional:       Appearance: She is well-developed  HENT:      Head: Normocephalic and atraumatic  Eyes:      Extraocular Movements: Extraocular movements intact  Pupils: Pupils are equal, round, and reactive to light  Neck:      Musculoskeletal: Normal range of motion  Cardiovascular:      Rate and Rhythm: Normal rate and regular rhythm  Pulmonary:      Effort: Pulmonary effort is normal  Tachypnea present  Breath sounds: Decreased breath sounds present  Skin:     General: Skin is warm  Capillary Refill: Capillary refill takes less than 2 seconds  Neurological:      General: No focal deficit present  Mental Status: She is alert     Psychiatric:         Mood and Affect: Mood normal          Behavior: Behavior normal          Vital Signs  ED Triage Vitals   Temperature Pulse Respirations Blood Pressure SpO2   02/23/21 0554 02/23/21 0549 02/23/21 0549 02/23/21 0549 02/23/21 0549   98 5 °F (36 9 °C) 80 20 132/61 94 %      Temp Source Heart Rate Source Patient Position - Orthostatic VS BP Location FiO2 (%)   02/23/21 0554 02/23/21 0549 02/23/21 0549 02/23/21 0549 --   Oral Monitor Sitting Right arm       Pain Score       02/23/21 1141       No Pain           Vitals:    02/23/21 0549 02/23/21 1141 02/23/21 1358 02/23/21 1541   BP: 132/61 141/62 125/73 113/73   Pulse: 80 80  75   Patient Position - Orthostatic VS: Sitting Sitting           Visual Acuity      ED Medications  Medications   amLODIPine (NORVASC) tablet 5 mg (has no administration in time range)   aspirin chewable tablet 81 mg (has no administration in time range)   atorvastatin (LIPITOR) tablet 40 mg (has no administration in time range)   budesonide-formoterol (SYMBICORT) 160-4 5 mcg/act inhaler 2 puff (has no administration in time range)   citalopram (CeleXA) tablet 20 mg (has no administration in time range)   gabapentin (NEURONTIN) capsule 200 mg (has no administration in time range)   levothyroxine tablet 88 mcg (has no administration in time range)   lisinopril (ZESTRIL) tablet 20 mg (20 mg Oral Given 2/23/21 1358)   cholecalciferol (VITAMIN D3) tablet 2,000 Units (2,000 Units Oral Given 2/23/21 1357)   ascorbic acid (VITAMIN C) tablet 1,000 mg (1,000 mg Oral Given 2/23/21 1358)   zinc sulfate (ZINCATE) capsule 220 mg (220 mg Oral Given 2/23/21 1358)     Followed by   multivitamin-minerals (CENTRUM ADULTS) tablet 1 tablet (has no administration in time range)   famotidine (PEPCID) tablet 10 mg (10 mg Oral Given 2/23/21 1358)   heparin (porcine) subcutaneous injection 7,500 Units (7,500 Units Subcutaneous Given 2/23/21 1402)   remdesivir (Veklury) 200 mg in sodium chloride 0 9 % 250 mL IVPB (0 mg Intravenous Stopped 2/23/21 1854)     Followed by   remdesivir Asia Martinez) 100 mg in sodium chloride 0 9 % 250 mL IVPB (has no administration in time range)   acetaminophen (TYLENOL) tablet 650 mg (has no administration in time range)   ondansetron (ZOFRAN) injection 4 mg (has no administration in time range)   dexamethasone (DECADRON) injection 6 mg (6 mg Intravenous Given 2/23/21 1401)   ceftriaxone (ROCEPHIN) 1 g/50 mL in dextrose IVPB (0 mg Intravenous Stopped 2/23/21 0917)   doxycycline hyclate (VIBRAMYCIN) capsule 100 mg (100 mg Oral Given 2/23/21 0847)       Diagnostic Studies  Results Reviewed     Procedure Component Value Units Date/Time    COVID19, Influenza A/B, RSV PCR, [facility] [[id number]]  (Abnormal) Collected: 02/23/21 0624    Lab Status: Final result Specimen: Nares from Nasopharyngeal Swab Updated: 02/23/21 0711     SARS-CoV-2 Positive     INFLUENZA A PCR Negative     INFLUENZA B PCR Negative     RSV PCR Negative    Narrative: This test has been authorized by FDA under an EUA (Emergency Use Assay) for use by authorized laboratories  Clinical caution and judgement should be used with the interpretation of these results with consideration of the clinical impression and other laboratory testing  Testing reported as "Positive" or "Negative" has been proven to be accurate according to standard laboratory validation requirements  All testing is performed with control materials showing appropriate reactivity at standard intervals  Lactic acid, plasma [[id number]]  (Normal) Collected: 02/23/21 0624    Lab Status: Final result Specimen: Blood from Arm, Right Updated: 02/23/21 0659     LACTIC ACID 1 2 mmol/L     Narrative:      Result may be elevated if tourniquet was used during collection      Troponin I [[id number]]  (Normal) Collected: 02/23/21 0624    Lab Status: Final result Specimen: Blood from Arm, Right Updated: 02/23/21 0658     Troponin I <0 02 ng/mL     Comprehensive metabolic panel [[id number]]  (Abnormal) Collected: 02/23/21 0624    Lab Status: Final result Specimen: Blood from Arm, Right Updated: 02/23/21 0653     Sodium 135 mmol/L      Potassium 4 0 mmol/L      Chloride 100 mmol/L      CO2 25 mmol/L      ANION GAP 10 mmol/L      BUN 18 mg/dL      Creatinine 1 62 mg/dL      Glucose 109 mg/dL      Calcium 8 5 mg/dL      Corrected Calcium 9 5 mg/dL      AST 24 U/L      ALT 18 U/L      Alkaline Phosphatase 76 U/L      Total Protein 6 9 g/dL      Albumin 2 8 g/dL      Total Bilirubin 0 40 mg/dL      eGFR 29 ml/min/1 73sq m     Narrative:      National Kidney Disease Foundation guidelines for Chronic Kidney Disease (CKD):     Stage 1 with normal or high GFR (GFR > 90 mL/min/1 73 square meters)    Stage 2 Mild CKD (GFR = 60-89 mL/min/1 73 square meters)    Stage 3A Moderate CKD (GFR = 45-59 mL/min/1 73 square meters)    Stage 3B Moderate CKD (GFR = 30-44 mL/min/1 73 square meters)    Stage 4 Severe CKD (GFR = 15-29 mL/min/1 73 square meters)    Stage 5 End Stage CKD (GFR <15 mL/min/1 73 square meters)  Note: GFR calculation is accurate only with a steady state creatinine    Lipase [[id number]]  (Normal) Collected: 02/23/21 0624    Lab Status: Final result Specimen: Blood from Arm, Right Updated: 02/23/21 0653     Lipase 163 u/L     CBC and differential [[id number]]  (Abnormal) Collected: 02/23/21 0624    Lab Status: Final result Specimen: Blood from Arm, Right Updated: 02/23/21 0633     WBC 3 72 Thousand/uL      RBC 4 84 Million/uL      Hemoglobin 12 6 g/dL      Hematocrit 40 2 %      MCV 83 fL      MCH 26 0 pg      MCHC 31 3 g/dL      RDW 16 1 %      MPV 11 8 fL      Platelets 580 Thousands/uL      nRBC 0 /100 WBCs      Neutrophils Relative 69 %      Immat GRANS % 1 %      Lymphocytes Relative 16 %      Monocytes Relative 14 %      Eosinophils Relative 0 %      Basophils Relative 0 %      Neutrophils Absolute 2 59 Thousands/µL      Immature Grans Absolute 0 02 Thousand/uL      Lymphocytes Absolute 0 58 Thousands/µL      Monocytes Absolute 0 53 Thousand/µL      Eosinophils Absolute 0 00 Thousand/µL      Basophils Absolute 0 00 Thousands/µL                  CT chest abdomen pelvis wo contrast   Final Result by Eddie Burks MD (02/23 6171)      No evidence of aortic aneurysm  Nondiagnostic evaluation for possible dissection in the absence of intravenous contrast enhancement  Bilateral multi lobar pneumonia compatible with confirmed Covid 19 infection  Colonic diverticulosis without diverticulitis  Workstation performed: [id number]                    Procedures  ECG 12 Lead Documentation Only    Date/Time: 2/23/2021 6:00 AM  Performed by: Mireya Paul PA-C  Authorized by: Mireya Paul PA-C     Indications / Diagnosis:  Chest pain  ECG reviewed by me, the ED Provider: yes    Patient location:  ED  Previous ECG:     Previous ECG:  Compared to current    Similarity:  No change  Interpretation:     Interpretation: normal    Rate:     ECG rate:  73    ECG rate assessment: normal    Rhythm:     Rhythm: sinus rhythm               ED Course               Identification of Seniors at Risk      Most Recent Value   (ISAR) Identification of Seniors at Risk   Before the illness or injury that brought you to the Emergency, did you need someone to help you on a regular basis? 0 Filed at: 02/23/2021 0553   In the last 24 hours, have you needed more help than usual?  0 Filed at: 02/23/2021 6259   Have you been hospitalized for one or more nights during the past 6 months? 0 Filed at: 02/23/2021 0553   In general, do you see well?  0 Filed at: 02/23/2021 0553   In general, do you have serious problems with your memory? 0 Filed at: 02/23/2021 4820   Do you take more than three different medications every day? 1 Filed at: 02/23/2021 0553   ISAR Score  1 Filed at: 02/23/2021 2593                    SBIRT 22yo+      Most Recent Value   SBIRT (25 yo +)   In order to provide better care to our patients, we are screening all of our patients for alcohol and drug use   Would it be okay to ask you these screening questions? Yes Filed at: 02/23/2021 2782   Initial Alcohol Screen: US AUDIT-C    1  How often do you have a drink containing alcohol?  0 Filed at: 02/23/2021 0609   2  How many drinks containing alcohol do you have on a typical day you are drinking? 0 Filed at: 02/23/2021 0609   3b  FEMALE Any Age, or MALE 65+: How often do you have 4 or more drinks on one occassion? 0 Filed at: 02/23/2021 0729   Audit-C Score  0 Filed at: 02/23/2021 1384   DILIA: How many times in the past year have you    Used an illegal drug or used a prescription medication for non-medical reasons? Never Filed at: 02/23/2021 0609                    MDM  Number of Diagnoses or Management Options  Acute respiratory failure with hypoxia [facility]):   Pneumonia due to COVID-19 virus:   Diagnosis management comments: Patient is an 27-year-old female presents to the emergency department with complaints of chest pain, shortness of breath, nausea, diarrhea, abdominal pain  Patient states symptoms began 2 days ago  She denies any fevers, chills  States that she feels generally unwell  She may have had a possible cover exposure  She denies any diaphoresis  On examination, patient was hypoxic upon arrival 88% on room air  She is placed on 2 L of oxygen improved to 94%  She had decreased breath sounds bilaterally  She had tenderness palpation diffusely about her abdomen  Remainder of exam is unremarkable  Lab evaluations performed and is positive for COVID  Remainder of lab is unremarkable  CT chest abdomen pelvis was obtained and does show bilateral COVID pneumonia  Patient will be admitted to the hospital for respiratory failure with hypoxia secondary to COVID         Amount and/or Complexity of Data Reviewed  Clinical lab tests: ordered and reviewed  Tests in the radiology section of CPT®: ordered and reviewed  Independent visualization of images, tracings, or specimens: yes    Risk of Complications, Morbidity, and/or Mortality  Presenting problems: moderate  Diagnostic procedures: moderate  Management options: moderate    Patient Progress  Patient progress: stable      Disposition  Final diagnoses:   Pneumonia due to COVID-19 virus   Acute respiratory failure with hypoxia ([address] )     Time reflects when diagnosis was documented in both MDM as applicable and the Disposition within this note     Time User Action Codes Description Comment    2/23/2021  8:29 AM Kristine Colmenares Add [U07 1,  J12 82] Pneumonia due to COVID-19 virus     2/23/2021  8:29 AM Kristine Colmenares Add [J96 01] Acute respiratory failure with hypoxia [facility])       ED Disposition     ED Disposition Condition Date/Time Comment    Admit Stable Tue Feb 23, 2021 0829 Case was discussed with Dr Debora Daniels and the patient's admission status was agreed to be Admission Status: inpatient status to the service of Dr Debora Daniels           Follow-up Information    None         Current Discharge Medication List      CONTINUE these medications which have NOT CHANGED    Details   amLODIPine (NORVASC) 5 mg tablet Take by mouth      aspirin 81 MG tablet Take by mouth      atorvastatin (LIPITOR) 40 mg tablet atorvastatin 40 mg tablet   TAKE 1 TABLET BY MOUTH EVERY DAY AT NIGHT      budesonide-formoterol (Symbicort) 160-4 5 mcg/act inhaler Inhale 2 puffs 2 (two) times a day      citalopram (CeleXA) 20 mg tablet citalopram 20 mg tablet      gabapentin (NEURONTIN) 100 mg capsule Take 100 mg by mouth 4 (four) times a day       !! levothyroxine 100 mcg tablet Take by mouth      !! levothyroxine 88 mcg tablet Take 88 mcg by mouth daily      lisinopril (ZESTRIL) 20 mg tablet Take by mouth      omeprazole (PriLOSEC) 20 mg delayed release capsule Take 20 mg by mouth 2 (two) times a day      acetaminophen (TYLENOL) 325 mg tablet Take 3 tablets (975 mg total) by mouth every 8 (eight) hours  Qty: 30 tablet, Refills: 0    Associated Diagnoses: C3 cervical fracture (HCC)      alendronate (FOSAMAX) 35 mg tablet alendronate 35 mg tablet   TAKE 1 TABLET BY MOUTH EVERY WEEK IN THE MORNING      azelastine (OPTIVAR) 0 05 % ophthalmic solution azelastine 0 05 % eye drops   INSTILL 1 DROP INTO BOTH EYES TWICE A DAY      clotrimazole-betamethasone (LOTRISONE) 1-0 05 % cream clotrimazole-betamethasone 1 %-0 05 % topical cream   APPLY TO GROIN TWICE A DAY FOR TWO WEEKS      famotidine (PEPCID) 20 mg tablet famotidine 20 mg tablet      fluticasone-salmeterol (ADVAIR DISKUS) 250-50 mcg/dose inhaler as needed     Comments: Substitution to a formulary equivalent within the same pharmaceutical class is authorized  ibuprofen (MOTRIN) 600 mg tablet 1 tablet every 24 hours      nystatin (MYCOSTATIN) ointment Nystatin [id number] UNIT/GM External Ointment  APPLY INCH  PRN   Refills: 0    Active       !! - Potential duplicate medications found  Please discuss with provider  No discharge procedures on file      PDMP Review       Value Time User    PDMP Reviewed  Yes 9/16/2019  2:45 PM [address] [place], [place]          ED Provider  Electronically Signed by           Mireya Paul PA-C  02/23/21 0929

## 2021-02-24 NOTE — CASE MANAGEMENT
CM spoke to pt and to son Latia Guy via [phone]-she is Covid +  Pt lives with her son Latia Guy in a bi-level house with no JULIETH and 6 steps w/railing to reach the main level  Pt is able to navigate steps, but it does cause SOB  Pt has COPD and sees pulmonologist-Dr Herrera Nurse  Her PCP is Dr Annette James  She uses a walker to ambulate and is independent with ADL's  She has never been in rehab, but has used a  [facility]NA in the past, but does not remember the name of the agency  Denies SA or MH issues  She quit smoking 45 years ago  She uses [employer] and has been having difficulty with paying for her Advair  She is  working with her PCP and pulmonologist regarding this issue  Pt has an Advanced Directive and her POA is leann Guy  Pt does not work or drive  Son transports to appointments or she goes by [employer]  Son will transport home when she is medically cleared  CM discussed d/c needs and pt and son are in agreement for pt to be discharged home with [facility] services  Understands that it is her preference  CM will continue to follow   CM reviewed discharge planning process including the following: identifying help at home, patient preference for discharge planning needs, pharmacy preference, and availability of treatment team to discuss questions or concerns patient and/or family may have regarding understanding medications and recognizing signs and symptoms once discharged  CM also encouraged patient to follow up with all recommended appointments after discharge  Patient advised of importance for patient and family to participate in managing patients medical well being

## 2021-02-24 NOTE — PROGRESS NOTES
Progress Note - Breanna Members 1935, 82 y o  female MRN: [id number]    Unit/Bed#: -01 Encounter: [id number]    Primary Care Provider: Max Hatfield MD   Date and time admitted to hospital: 2/23/2021  5:48 AM        Epigastric pain  Assessment & Plan  Patient complained of intermittent epigastric pain, non-radiating  Chart review showed patient have been having abdominal pain for a while  Had history of ulcer  Troponin was negative at ED  Was on omeprazole 20 mg p o  Q d  at home  Continue to treat with famotidine per COVID-19 treatment pathway  Outpatient GI follow-up    Hypothyroidism  Assessment & Plan  Continue levothyroxine    COPD (chronic obstructive pulmonary disease) ([facility] )  Assessment & Plan  History of COPD on Symbicort  Not on oxygen at home  Currently patient is on 1 L of nasal cannula, SpO2 94%  PE:  Reduced breath sound bilaterally, no wheezing or crackles  Treat patient with Decadron per COVID-19 treatment pathway        KRISTYN (acute kidney injury) ([facility] )  Assessment & Plan  History of CKD, baseline creatinine at 1 3  Creatinine was 1 62 on presentation, repeat Cr was 1 66, stable  Patient was on ibuprofen for knee pains  Hold on ibuprofen  In the setting of COVID-19 pneumonia, will hold fluids  Monitor CMP daily    Essential hypertension  Assessment & Plan  Blood pressure is well controlled  Continue home medication amlodipine and lisinopril    * Pneumonia due to COVID-19 virus  Assessment & Plan  Patient presents with shortness of breath and fatigue, associated with nausea and vomiting, loose to watery diarrhea  Patient denies cough, fever, chills  Patient is not on oxygen at home  Patient lives at home and denies any COVID contacts  COVID-19 test was positive at ED  Leukopenia  CT chest showed Bilateral multi lobar pneumonia compatible with confirmed Covid 19 infection    Patient currently needs 1 L of oxygen  D-dimer 1 24, Ferritin within normal range  CRP 38 3, procalcitonin 0 06  --continue to treat patient with COVID-19 mild pathway  --offer patient convalescent plasma, but patient refused              VTE Pharmacologic Prophylaxis:   Pharmacologic: Heparin  Mechanical VTE Prophylaxis in Place: Yes    Discussions with Specialists or Other Care Team Provider:  None    Education and Discussions with Family / Patient:  Updated her son with current treatment and patient's condition    Current Length of Stay: 1 day(s)    Current Patient Status: Inpatient     Discharge Plan / Estimated Discharge Date: To be determined    Code Status: Level 1 - Full Code      Subjective:   Patient felt tired after she wash herself and ate her breakfast   Patient denies shortness of breath on 1 L of nasal cannula  No chest pain, fever, chills, abdominal pain  Objective:     Vitals:   Temp (24hrs), Av 6 °F (36 4 °C), Min:97 1 °F (36 2 °C), Max:98 °F (36 7 °C)    Temp:  [97 1 °F (36 2 °C)-98 °F (36 7 °C)] 97 1 °F (36 2 °C)  HR:  [67-77] 67  Resp:  [16-20] 16  BP: (113-118)/(73) 118/73  SpO2:  [93 %-95 %] 93 %  Body mass index is 44 65 kg/m²  Input and Output Summary (last 24 hours): Intake/Output Summary (Last 24 hours) at 2021 1430  Last data filed at 2021 1807  Gross per 24 hour   Intake 120 ml   Output --   Net 120 ml       Physical Exam:     Physical Exam  Constitutional:       General: She is not in acute distress  Appearance: She is well-developed  She is obese  She is not diaphoretic  HENT:      Head: Normocephalic  Mouth/Throat:      Pharynx: No oropharyngeal exudate  Eyes:      Pupils: Pupils are equal, round, and reactive to light  Neck:      Musculoskeletal: Normal range of motion  Cardiovascular:      Rate and Rhythm: Normal rate and regular rhythm  Heart sounds: No murmur  Pulmonary:      Effort: Pulmonary effort is normal  No respiratory distress        Breath sounds: Examination of the right-lower field reveals decreased breath sounds  Examination of the left-lower field reveals decreased breath sounds  Decreased breath sounds present  No wheezing or rales  Comments: On 1 L of nasal cannula  Abdominal:      General: Bowel sounds are normal       Palpations: Abdomen is soft  Tenderness: There is abdominal tenderness (Epigastric)  Musculoskeletal: Normal range of motion  General: No tenderness  Skin:     General: Skin is warm  Neurological:      Mental Status: She is alert and oriented to person, place, and time  Additional Data:     Labs:    Results from last 7 days   Lab Units 02/24/21  0539   WBC Thousand/uL 2 99*   HEMOGLOBIN g/dL 13 6   HEMATOCRIT % 43 6   PLATELETS Thousands/uL 201   NEUTROS PCT % 67   LYMPHS PCT % 19   MONOS PCT % 14*   EOS PCT % 0     Results from last 7 days   Lab Units 02/24/21  0539   POTASSIUM mmol/L 4 4   CHLORIDE mmol/L 101   CO2 mmol/L 21   BUN mg/dL 27*   CREATININE mg/dL 1 66*   CALCIUM mg/dL 8 6   ALK PHOS U/L 78   ALT U/L 19   AST U/L 25           * I Have Reviewed All Lab Data Listed Above  * Additional Pertinent Lab Tests Reviewed:  All Labs Within Last 24 Hours Reviewed    Imaging:    Imaging Reports Reviewed Today Include:  None  Imaging Personally Reviewed by Myself Includes:  None    Recent Cultures (last 7 days):           Last 24 Hours Medication List:   Current Facility-Administered Medications   Medication Dose Route Frequency Provider Last Rate    acetaminophen  650 mg Oral Q6H PRN Liliya Sun MD      amLODIPine  5 mg Oral Daily Liliya Sun MD      ascorbic acid  1,000 mg Oral Q12H [facility] Liliya Sun MD      aspirin  81 mg Oral Daily Liilya Sun MD      atorvastatin  40 mg Oral Daily With Maru Palencia MD      budesonide-formoterol  2 puff Inhalation BID Liliya Sun MD      cholecalciferol  2,000 Units Oral Daily Liliya Sun MD      citalopram  20 mg Oral Daily Liliya Sun MD      dexamethasone  6 mg Intravenous Q24H Liliya Sun MD      famotidine 10 mg Oral Daily Anthony Penn MD      gabapentin  200 mg Oral HS Anthony Penn MD      heparin (porcine)  7,500 Units Subcutaneous Q8H [address] Anthony Penn MD      levothyroxine  88 mcg Oral Early Morning Anthony Penn MD      lisinopril  20 mg Oral Daily Anthony Penn MD      zinc sulfate  220 mg Oral Daily Anthony Penn MD      Followed by   Mar Roque ON 3/2/2021] multivitamin-minerals  1 tablet Oral Daily Anthony Penn MD      ondansetron  4 mg Intravenous Q6H PRN Anthony Penn MD      remdesivir  100 mg Intravenous Q24H Anthony Penn MD          Today, Patient Was Seen By: Anthony Penn MD    ** Please Note: This note has been constructed using a voice recognition system   **

## 2021-02-24 NOTE — ASSESSMENT & PLAN NOTE
Patient complained of intermittent epigastric pain, non-radiating  Chart review showed patient have been having abdominal pain for a while  Had history of ulcer    Troponin was negative at ED  Was on omeprazole 20 mg p o  Q d  at home  Continue to treat with famotidine per COVID-19 treatment pathway  Outpatient GI follow-up

## 2021-02-24 NOTE — ASSESSMENT & PLAN NOTE
Patient presents with shortness of breath and fatigue, associated with nausea and vomiting, loose to watery diarrhea  Patient denies cough, fever, chills  Patient is not on oxygen at home  Patient lives at home and denies any COVID contacts  COVID-19 test was positive at ED  Leukopenia  CT chest showed Bilateral multi lobar pneumonia compatible with confirmed Covid 19 infection    Patient currently needs 1 L of oxygen  D-dimer 1 24, Ferritin within normal range  CRP 38 3, procalcitonin 0 06  --continue to treat patient with COVID-19 mild pathway  --offer patient convalescent plasma, but patient refused

## 2021-02-24 NOTE — ASSESSMENT & PLAN NOTE
History of CKD, baseline creatinine at 1 3  Creatinine was 1 62 on presentation, repeat Cr was 1 66, stable  Patient was on ibuprofen for knee pains  Hold on ibuprofen  In the setting of COVID-19 pneumonia, will hold fluids    Monitor CMP daily

## 2021-02-24 NOTE — ASSESSMENT & PLAN NOTE
History of COPD on Symbicort  Not on oxygen at home  Currently patient is on 1 L of nasal cannula, SpO2 94%  PE:  Reduced breath sound bilaterally, no wheezing or crackles  Treat patient with Decadron per COVID-19 treatment pathway

## 2021-02-25 LAB
ALBUMIN SERPL BCP-MCNC: 2.5 G/DL (ref 3.5–5)
ALP SERPL-CCNC: 70 U/L (ref 46–116)
ALT SERPL W P-5'-P-CCNC: 18 U/L (ref 12–78)
ANION GAP SERPL CALCULATED.3IONS-SCNC: 12 MMOL/L (ref 4–13)
AST SERPL W P-5'-P-CCNC: 24 U/L (ref 5–45)
BASOPHILS # BLD AUTO: 0 THOUSANDS/ΜL (ref 0–0.1)
BASOPHILS NFR BLD AUTO: 0 % (ref 0–1)
BILIRUB SERPL-MCNC: 0.3 MG/DL (ref 0.2–1)
BUN SERPL-MCNC: 34 MG/DL (ref 5–25)
CALCIUM ALBUM COR SERPL-MCNC: 9.5 MG/DL (ref 8.3–10.1)
CALCIUM SERPL-MCNC: 8.3 MG/DL (ref 8.3–10.1)
CHLORIDE SERPL-SCNC: 104 MMOL/L (ref 100–108)
CO2 SERPL-SCNC: 22 MMOL/L (ref 21–32)
CREAT SERPL-MCNC: 1.45 MG/DL (ref 0.6–1.3)
EOSINOPHIL # BLD AUTO: 0 THOUSAND/ΜL (ref 0–0.61)
EOSINOPHIL NFR BLD AUTO: 0 % (ref 0–6)
ERYTHROCYTE [DISTWIDTH] IN BLOOD BY AUTOMATED COUNT: 16.2 % (ref 11.6–15.1)
GFR SERPL CREATININE-BSD FRML MDRD: 33 ML/MIN/1.73SQ M
GLUCOSE SERPL-MCNC: 114 MG/DL (ref 65–140)
HCT VFR BLD AUTO: 40.9 % (ref 34.8–46.1)
HGB BLD-MCNC: 12.5 G/DL (ref 11.5–15.4)
IMM GRANULOCYTES # BLD AUTO: 0.01 THOUSAND/UL (ref 0–0.2)
IMM GRANULOCYTES NFR BLD AUTO: 0 % (ref 0–2)
LYMPHOCYTES # BLD AUTO: 0.63 THOUSANDS/ΜL (ref 0.6–4.47)
LYMPHOCYTES NFR BLD AUTO: 23 % (ref 14–44)
MCH RBC QN AUTO: 25.4 PG (ref 26.8–34.3)
MCHC RBC AUTO-ENTMCNC: 30.6 G/DL (ref 31.4–37.4)
MCV RBC AUTO: 83 FL (ref 82–98)
MONOCYTES # BLD AUTO: 0.46 THOUSAND/ΜL (ref 0.17–1.22)
MONOCYTES NFR BLD AUTO: 17 % (ref 4–12)
NEUTROPHILS # BLD AUTO: 1.61 THOUSANDS/ΜL (ref 1.85–7.62)
NEUTS SEG NFR BLD AUTO: 60 % (ref 43–75)
NRBC BLD AUTO-RTO: 0 /100 WBCS
PLATELET # BLD AUTO: 208 THOUSANDS/UL (ref 149–390)
PMV BLD AUTO: 12.3 FL (ref 8.9–12.7)
POTASSIUM SERPL-SCNC: 4.4 MMOL/L (ref 3.5–5.3)
PROCALCITONIN SERPL-MCNC: 0.05 NG/ML
PROT SERPL-MCNC: 6.5 G/DL (ref 6.4–8.2)
RBC # BLD AUTO: 4.92 MILLION/UL (ref 3.81–5.12)
SODIUM SERPL-SCNC: 138 MMOL/L (ref 136–145)
WBC # BLD AUTO: 2.71 THOUSAND/UL (ref 4.31–10.16)

## 2021-02-25 PROCEDURE — 80053 COMPREHEN METABOLIC PANEL: CPT | Performed by: INTERNAL MEDICINE

## 2021-02-25 PROCEDURE — 84145 PROCALCITONIN (PCT): CPT | Performed by: INTERNAL MEDICINE

## 2021-02-25 PROCEDURE — 99232 SBSQ HOSP IP/OBS MODERATE 35: CPT | Performed by: STUDENT IN AN ORGANIZED HEALTH CARE EDUCATION/TRAINING PROGRAM

## 2021-02-25 PROCEDURE — 85025 COMPLETE CBC W/AUTO DIFF WBC: CPT | Performed by: INTERNAL MEDICINE

## 2021-02-25 RX ORDER — GUAIFENESIN 600 MG
600 TABLET, EXTENDED RELEASE 12 HR ORAL EVERY 12 HOURS SCHEDULED
Status: DISCONTINUED | OUTPATIENT
Start: 2021-02-25 | End: 2021-02-26 | Stop reason: HOSPADM

## 2021-02-25 RX ADMIN — FAMOTIDINE 10 MG: 20 TABLET ORAL at 09:52

## 2021-02-25 RX ADMIN — REMDESIVIR 100 MG: 100 INJECTION, POWDER, LYOPHILIZED, FOR SOLUTION INTRAVENOUS at 15:01

## 2021-02-25 RX ADMIN — GABAPENTIN 200 MG: 100 CAPSULE ORAL at 23:09

## 2021-02-25 RX ADMIN — DEXAMETHASONE SODIUM PHOSPHATE 6 MG: 4 INJECTION, SOLUTION INTRAMUSCULAR; INTRAVENOUS at 15:01

## 2021-02-25 RX ADMIN — ONDANSETRON 4 MG: 2 INJECTION INTRAMUSCULAR; INTRAVENOUS at 11:51

## 2021-02-25 RX ADMIN — CITALOPRAM HYDROBROMIDE 20 MG: 20 TABLET ORAL at 09:52

## 2021-02-25 RX ADMIN — GUAIFENESIN 600 MG: 600 TABLET ORAL at 10:27

## 2021-02-25 RX ADMIN — HEPARIN SODIUM 7500 UNITS: 5000 INJECTION INTRAVENOUS; SUBCUTANEOUS at 23:09

## 2021-02-25 RX ADMIN — OXYCODONE HYDROCHLORIDE AND ACETAMINOPHEN 1000 MG: 500 TABLET ORAL at 23:09

## 2021-02-25 RX ADMIN — Medication 2000 UNITS: at 09:23

## 2021-02-25 RX ADMIN — LEVOTHYROXINE SODIUM 88 MCG: 88 TABLET ORAL at 05:34

## 2021-02-25 RX ADMIN — BUDESONIDE AND FORMOTEROL FUMARATE DIHYDRATE 2 PUFF: 160; 4.5 AEROSOL RESPIRATORY (INHALATION) at 18:12

## 2021-02-25 RX ADMIN — ZINC SULFATE 220 MG (50 MG) CAPSULE 220 MG: CAPSULE at 09:51

## 2021-02-25 RX ADMIN — BUDESONIDE AND FORMOTEROL FUMARATE DIHYDRATE 2 PUFF: 160; 4.5 AEROSOL RESPIRATORY (INHALATION) at 09:52

## 2021-02-25 RX ADMIN — AMLODIPINE BESYLATE 5 MG: 5 TABLET ORAL at 09:52

## 2021-02-25 RX ADMIN — ATORVASTATIN CALCIUM 40 MG: 40 TABLET, FILM COATED ORAL at 16:12

## 2021-02-25 RX ADMIN — OXYCODONE HYDROCHLORIDE AND ACETAMINOPHEN 1000 MG: 500 TABLET ORAL at 09:51

## 2021-02-25 RX ADMIN — HEPARIN SODIUM 7500 UNITS: 5000 INJECTION INTRAVENOUS; SUBCUTANEOUS at 15:01

## 2021-02-25 RX ADMIN — ASPIRIN 81 MG: 81 TABLET, CHEWABLE ORAL at 09:51

## 2021-02-25 RX ADMIN — LISINOPRIL 20 MG: 20 TABLET ORAL at 09:51

## 2021-02-25 RX ADMIN — GUAIFENESIN 600 MG: 600 TABLET ORAL at 23:09

## 2021-02-25 RX ADMIN — HEPARIN SODIUM 7500 UNITS: 5000 INJECTION INTRAVENOUS; SUBCUTANEOUS at 05:34

## 2021-02-25 NOTE — PROGRESS NOTES
Progress Note - Gena Chavez 1935, 82 y o  female MRN: [id number]    Unit/Bed#: -01 Encounter: [id number]    Primary Care Provider: Agus Rizo MD   Date and time admitted to hospital: 2/23/2021  5:48 AM        Epigastric pain  Assessment & Plan  Patient complained of intermittent epigastric pain, non-radiating  Chart review showed patient have been having abdominal pain for a while  Had history of ulcer  Troponin was negative at ED  Was on omeprazole 20 mg p o  Q d  at home  Continue to treat with famotidine per COVID-19 treatment pathway  Outpatient GI follow-up    Hypothyroidism  Assessment & Plan  Continue levothyroxine    COPD (chronic obstructive pulmonary disease) ([facility] )  Assessment & Plan  History of COPD on Symbicort  Not on oxygen at home  Currently patient is on 1 L of nasal cannula, SpO2 94%  PE:  Reduced breath sound bilaterally, no wheezing or crackles  Treat patient with Decadron per COVID-19 treatment pathway        KRISTYN (acute kidney injury) ([facility] )  Assessment & Plan  History of CKD, baseline creatinine at 1 3  Creatinine was 1 62 on presentation,   repeat Cr was 1 45, stable  Patient was on ibuprofen for knee pains  Hold on ibuprofen  In the setting of COVID-19 pneumonia, will hold fluids  Monitor CMP daily    Essential hypertension  Assessment & Plan  Blood pressure is well controlled  Continue home medication amlodipine and lisinopril    * Pneumonia due to COVID-19 virus  Assessment & Plan  Patient presents with shortness of breath and fatigue, associated with nausea and vomiting, loose to watery diarrhea  Patient denies cough, fever, chills  Patient is not on oxygen at home  Patient lives at home and denies any COVID contacts  COVID-19 test was positive at ED  CT chest showed Bilateral multi lobar pneumonia compatible with confirmed Covid 19 infection    Patient currently needs 1 L of oxygen  D-dimer 1 24, Ferritin within normal range  CRP 38 3,   procalcitonin 0 06->0 05  WBC 3 72->2 99->2 71  --continue to treat patient with COVID-19 mild pathway  --day 3 of remdesivir and dexamethasone  --Recheck CRP and D-dimer tomorrow  --home O2 evaluation before discharge              VTE Pharmacologic Prophylaxis:   Pharmacologic: Heparin  Mechanical VTE Prophylaxis in Place: Yes    Discussions with Specialists or Other Care Team Provider:  None    Education and Discussions with Family / Patient:  Updated her son about patient's condition and treatment plan  Current Length of Stay: 2 day(s)    Current Patient Status: Inpatient     Discharge Plan / Estimated Discharge Date: To be determined    Code Status: Level 1 - Full Code      Subjective:   Patient complained of cough with mucus  Patient does not have diarrhea  Patient did not have bowel movement yesterday  No fever, chills, chest pain, abdominal pain  Objective:     Vitals:   Temp (24hrs), Av 1 °F (36 7 °C), Min:97 8 °F (36 6 °C), Max:98 7 °F (37 1 °C)    Temp:  [97 8 °F (36 6 °C)-98 7 °F (37 1 °C)] 97 8 °F (36 6 °C)  HR:  [64-72] 64  Resp:  [16-20] 18  BP: (110-120)/(58-71) 116/68  SpO2:  [90 %-94 %] 94 %  Body mass index is 44 65 kg/m²  Input and Output Summary (last 24 hours):     No intake or output data in the 24 hours ending 21 1327    Physical Exam:     Physical Exam  Constitutional:       General: She is not in acute distress  Appearance: She is well-developed  She is obese  She is not diaphoretic  HENT:      Head: Normocephalic  Mouth/Throat:      Pharynx: No oropharyngeal exudate  Eyes:      Pupils: Pupils are equal, round, and reactive to light  Neck:      Musculoskeletal: Normal range of motion  Cardiovascular:      Rate and Rhythm: Normal rate and regular rhythm  Heart sounds: No murmur  Pulmonary:      Effort: Pulmonary effort is normal  No respiratory distress  Breath sounds: Examination of the right-lower field reveals decreased breath sounds   Examination of the left-lower field reveals decreased breath sounds  Decreased breath sounds present  No wheezing or rales  Comments: On 1 L of nasal cannula  Abdominal:      General: Bowel sounds are normal       Palpations: Abdomen is soft  Tenderness: There is abdominal tenderness (Epigastric)  Musculoskeletal: Normal range of motion  General: No tenderness  Skin:     General: Skin is warm  Neurological:      Mental Status: She is alert and oriented to person, place, and time  Additional Data:     Labs:    Results from last 7 days   Lab Units 02/25/21  0545   WBC Thousand/uL 2 71*   HEMOGLOBIN g/dL 12 5   HEMATOCRIT % 40 9   PLATELETS Thousands/uL 208   NEUTROS PCT % 60   LYMPHS PCT % 23   MONOS PCT % 17*   EOS PCT % 0     Results from last 7 days   Lab Units 02/25/21  0545   POTASSIUM mmol/L 4 4   CHLORIDE mmol/L 104   CO2 mmol/L 22   BUN mg/dL 34*   CREATININE mg/dL 1 45*   CALCIUM mg/dL 8 3   ALK PHOS U/L 70   ALT U/L 18   AST U/L 24           * I Have Reviewed All Lab Data Listed Above  * Additional Pertinent Lab Tests Reviewed:  All Labs Within Last 24 Hours Reviewed    Imaging:    Imaging Reports Reviewed Today Include:  None  Imaging Personally Reviewed by Myself Includes:  None    Recent Cultures (last 7 days):           Last 24 Hours Medication List:   Current Facility-Administered Medications   Medication Dose Route Frequency Provider Last Rate    acetaminophen  650 mg Oral Q6H PRN Elpidio Arguello MD      amLODIPine  5 mg Oral Daily Elpidio Arguello MD      ascorbic acid  1,000 mg Oral Q12H [facility] Elpidio Arguello MD      aspirin  81 mg Oral Daily Elpidio Arguello MD      atorvastatin  40 mg Oral Daily With Anita Lin MD      budesonide-formoterol  2 puff Inhalation BID Elpidio Arguello MD      cholecalciferol  2,000 Units Oral Daily Elpidio Arguello MD      citalopram  20 mg Oral Daily Elpidio Arguello MD      dexamethasone  6 mg Intravenous Q24H Elpidio Arguello MD      famotidine  10 mg Oral Daily Everton Zaman MD      gabapentin  200 mg Oral HS Everton Zaman MD      guaiFENesin  600 mg Oral Q12H Wendi Jimenez MD      heparin (porcine)  7,500 Units Subcutaneous Q8H [facility] Everton Zaman MD      levothyroxine  88 mcg Oral Early Morning Everton Zaman MD      lisinopril  20 mg Oral Daily Everton Zaman MD      zinc sulfate  220 mg Oral Daily Everton Zaman MD      Followed by   Cody Garrett ON 3/2/2021] multivitamin-minerals  1 tablet Oral Daily Everton Zaman MD      ondansetron  4 mg Intravenous Q6H PRN Everton Zaman MD      remdesivir  100 mg Intravenous Q24H Everton Zaman MD          Today, Patient Was Seen By: Everton Zaman MD    ** Please Note: This note has been constructed using a voice recognition system   **

## 2021-02-25 NOTE — ASSESSMENT & PLAN NOTE
History of CKD, baseline creatinine at 1 3  Creatinine was 1 62 on presentation,   repeat Cr was 1 45, stable  Patient was on ibuprofen for knee pains  Hold on ibuprofen  In the setting of COVID-19 pneumonia, will hold fluids    Monitor CMP daily

## 2021-02-25 NOTE — PLAN OF CARE
Problem: PAIN - ADULT  Goal: Verbalizes/displays adequate comfort level or baseline comfort level  Description: Interventions:  - Encourage patient to monitor pain and request assistance  - Assess pain using appropriate pain scale  - Administer analgesics based on type and severity of pain and evaluate response  - Implement non-pharmacological measures as appropriate and evaluate response  - Consider cultural and social influences on pain and pain management  - Notify physician/advanced practitioner if interventions unsuccessful or patient reports new pain  Outcome: Progressing     Problem: INFECTION - ADULT  Goal: Absence or prevention of progression during hospitalization  Description: INTERVENTIONS:  - Assess and monitor for signs and symptoms of infection  - Monitor lab/diagnostic results  - Monitor all insertion sites, i e  indwelling lines, tubes, and drains  - Monitor endotracheal if appropriate and nasal secretions for changes in amount and color  - [place] appropriate cooling/warming therapies per order  - Administer medications as ordered  - Instruct and encourage patient and family to use good hand hygiene technique  - Identify and instruct in appropriate isolation precautions for identified infection/condition  Outcome: Progressing  Goal: Absence of fever/infection during neutropenic period  Description: INTERVENTIONS:  - Monitor WBC    Outcome: Progressing     Problem: SAFETY ADULT  Goal: Patient will remain free of falls  Description: INTERVENTIONS:  - Assess patient frequently for physical needs  -  Identify cognitive and physical deficits and behaviors that affect risk of falls    -  [place] fall precautions as indicated by assessment   - Educate patient/family on patient safety including physical limitations  - Instruct patient to call for assistance with activity based on assessment  - Modify environment to reduce risk of injury  - Consider OT/PT consult to assist with strengthening/mobility  Outcome: Progressing  Goal: Maintain or return to baseline ADL function  Description: INTERVENTIONS:  -  Assess patient's ability to carry out ADLs; assess patient's baseline for ADL function and identify physical deficits which impact ability to perform ADLs (bathing, care of mouth/teeth, toileting, grooming, dressing, etc )  - Assess/evaluate cause of self-care deficits   - Assess range of motion  - Assess patient's mobility; develop plan if impaired  - Assess patient's need for assistive devices and provide as appropriate  - Encourage maximum independence but intervene and supervise when necessary  - Involve family in performance of ADLs  - Assess for home care needs following discharge   - Consider OT consult to assist with ADL evaluation and planning for discharge  - Provide patient education as appropriate  Outcome: Progressing  Goal: Maintain or return mobility status to optimal level  Description: INTERVENTIONS:  - Assess patient's baseline mobility status (ambulation, transfers, stairs, etc )    - Identify cognitive and physical deficits and behaviors that affect mobility  - Identify mobility aids required to assist with transfers and/or ambulation (gait belt, sit-to-stand, lift, walker, cane, etc )  - [place] fall precautions as indicated by assessment  - Record patient progress and toleration of activity level on Mobility SBAR; progress patient to next Phase/Stage  - Instruct patient to call for assistance with activity based on assessment  - Consider rehabilitation consult to assist with strengthening/weightbearing, etc   Outcome: Progressing     Problem: DISCHARGE PLANNING  Goal: Discharge to home or other facility with appropriate resources  Description: INTERVENTIONS:  - Identify barriers to discharge w/patient and caregiver  - Arrange for needed discharge resources and transportation as appropriate  - Identify discharge learning needs (meds, wound care, etc )  - Arrange for interpretive services to assist at discharge as needed  - Refer to Case Management Department for coordinating discharge planning if the patient needs post-hospital services based on physician/advanced practitioner order or complex needs related to functional status, cognitive ability, or social support system  Outcome: Progressing     Problem: Knowledge Deficit  Goal: Patient/family/caregiver demonstrates understanding of disease process, treatment plan, medications, and discharge instructions  Description: Complete learning assessment and assess knowledge base  Interventions:  - Provide teaching at level of understanding  - Provide teaching via preferred learning methods  Outcome: Progressing     Problem: Potential for Falls  Goal: Patient will remain free of falls  Description: INTERVENTIONS:  - Assess patient frequently for physical needs  -  Identify cognitive and physical deficits and behaviors that affect risk of falls    -  [place] fall precautions as indicated by assessment   - Educate patient/family on patient safety including physical limitations  - Instruct patient to call for assistance with activity based on assessment  - Modify environment to reduce risk of injury  - Consider OT/PT consult to assist with strengthening/mobility  Outcome: Progressing     Problem: Prexisting or High Potential for Compromised Skin Integrity  Goal: Skin integrity is maintained or improved  Description: INTERVENTIONS:  - Identify patients at risk for skin breakdown  - Assess and monitor skin integrity  - Assess and monitor nutrition and hydration status  - Monitor labs   - Assess for incontinence   - Turn and reposition patient  - Assist with mobility/ambulation  - Relieve pressure over bony prominences  - Avoid friction and shearing  - Provide appropriate hygiene as needed including keeping skin clean and dry  - Evaluate need for skin moisturizer/barrier cream  - Collaborate with interdisciplinary team   - Patient/family teaching  - Consider wound care consult   Outcome: Progressing

## 2021-02-25 NOTE — ASSESSMENT & PLAN NOTE
Patient presents with shortness of breath and fatigue, associated with nausea and vomiting, loose to watery diarrhea  Patient denies cough, fever, chills  Patient is not on oxygen at home  Patient lives at home and denies any COVID contacts  COVID-19 test was positive at ED  CT chest showed Bilateral multi lobar pneumonia compatible with confirmed Covid 19 infection    Patient currently needs 1 L of oxygen  D-dimer 1 24, Ferritin within normal range  CRP 38 3,   procalcitonin 0 06->0 05  WBC 3 72->2 99->2 71  --continue to treat patient with COVID-19 mild pathway  --day 3 of remdesivir and dexamethasone  --Recheck CRP and D-dimer tomorrow  --home O2 evaluation before discharge

## 2021-02-25 NOTE — PLAN OF CARE
Problem: PAIN - ADULT  Goal: Verbalizes/displays adequate comfort level or baseline comfort level  Description: Interventions:  - Encourage patient to monitor pain and request assistance  - Assess pain using appropriate pain scale  - Administer analgesics based on type and severity of pain and evaluate response  - Implement non-pharmacological measures as appropriate and evaluate response  - Consider cultural and social influences on pain and pain management  - Notify physician/advanced practitioner if interventions unsuccessful or patient reports new pain  Outcome: Progressing     Problem: INFECTION - ADULT  Goal: Absence or prevention of progression during hospitalization  Description: INTERVENTIONS:  - Assess and monitor for signs and symptoms of infection  - Monitor lab/diagnostic results  - Monitor all insertion sites, i e  indwelling lines, tubes, and drains  - Monitor endotracheal if appropriate and nasal secretions for changes in amount and color  - [place] appropriate cooling/warming therapies per order  - Administer medications as ordered  - Instruct and encourage patient and family to use good hand hygiene technique  - Identify and instruct in appropriate isolation precautions for identified infection/condition  Outcome: Progressing  Goal: Absence of fever/infection during neutropenic period  Description: INTERVENTIONS:  - Monitor WBC    Outcome: Progressing     Problem: SAFETY ADULT  Goal: Patient will remain free of falls  Description: INTERVENTIONS:  - Assess patient frequently for physical needs  -  Identify cognitive and physical deficits and behaviors that affect risk of falls    -  [place] fall precautions as indicated by assessment   - Educate patient/family on patient safety including physical limitations  - Instruct patient to call for assistance with activity based on assessment  - Modify environment to reduce risk of injury  - Consider OT/PT consult to assist with strengthening/mobility  Outcome: Progressing  Goal: Maintain or return to baseline ADL function  Description: INTERVENTIONS:  -  Assess patient's ability to carry out ADLs; assess patient's baseline for ADL function and identify physical deficits which impact ability to perform ADLs (bathing, care of mouth/teeth, toileting, grooming, dressing, etc )  - Assess/evaluate cause of self-care deficits   - Assess range of motion  - Assess patient's mobility; develop plan if impaired  - Assess patient's need for assistive devices and provide as appropriate  - Encourage maximum independence but intervene and supervise when necessary  - Involve family in performance of ADLs  - Assess for home care needs following discharge   - Consider OT consult to assist with ADL evaluation and planning for discharge  - Provide patient education as appropriate  Outcome: Progressing  Goal: Maintain or return mobility status to optimal level  Description: INTERVENTIONS:  - Assess patient's baseline mobility status (ambulation, transfers, stairs, etc )    - Identify cognitive and physical deficits and behaviors that affect mobility  - Identify mobility aids required to assist with transfers and/or ambulation (gait belt, sit-to-stand, lift, walker, cane, etc )  - [place] fall precautions as indicated by assessment  - Record patient progress and toleration of activity level on Mobility SBAR; progress patient to next Phase/Stage  - Instruct patient to call for assistance with activity based on assessment  - Consider rehabilitation consult to assist with strengthening/weightbearing, etc   Outcome: Progressing     Problem: DISCHARGE PLANNING  Goal: Discharge to home or other facility with appropriate resources  Description: INTERVENTIONS:  - Identify barriers to discharge w/patient and caregiver  - Arrange for needed discharge resources and transportation as appropriate  - Identify discharge learning needs (meds, wound care, etc )  - Arrange for interpretive services to assist at discharge as needed  - Refer to Case Management Department for coordinating discharge planning if the patient needs post-hospital services based on physician/advanced practitioner order or complex needs related to functional status, cognitive ability, or social support system  Outcome: Progressing     Problem: Knowledge Deficit  Goal: Patient/family/caregiver demonstrates understanding of disease process, treatment plan, medications, and discharge instructions  Description: Complete learning assessment and assess knowledge base  Interventions:  - Provide teaching at level of understanding  - Provide teaching via preferred learning methods  Outcome: Progressing     Problem: Potential for Falls  Goal: Patient will remain free of falls  Description: INTERVENTIONS:  - Assess patient frequently for physical needs  -  Identify cognitive and physical deficits and behaviors that affect risk of falls    -  [place] fall precautions as indicated by assessment   - Educate patient/family on patient safety including physical limitations  - Instruct patient to call for assistance with activity based on assessment  - Modify environment to reduce risk of injury  - Consider OT/PT consult to assist with strengthening/mobility  Outcome: Progressing     Problem: Prexisting or High Potential for Compromised Skin Integrity  Goal: Skin integrity is maintained or improved  Description: INTERVENTIONS:  - Identify patients at risk for skin breakdown  - Assess and monitor skin integrity  - Assess and monitor nutrition and hydration status  - Monitor labs   - Assess for incontinence   - Turn and reposition patient  - Assist with mobility/ambulation  - Relieve pressure over bony prominences  - Avoid friction and shearing  - Provide appropriate hygiene as needed including keeping skin clean and dry  - Evaluate need for skin moisturizer/barrier cream  - Collaborate with interdisciplinary team   - Patient/family teaching  - Consider wound care consult   Outcome: Progressing

## 2021-02-26 VITALS
HEART RATE: 52 BPM | OXYGEN SATURATION: 90 % | SYSTOLIC BLOOD PRESSURE: 121 MMHG | RESPIRATION RATE: 18 BRPM | DIASTOLIC BLOOD PRESSURE: 63 MMHG | TEMPERATURE: 98 F | BODY MASS INDEX: 44.65 KG/M2 | WEIGHT: 213.63 LBS

## 2021-02-26 LAB
ALBUMIN SERPL BCP-MCNC: 2.7 G/DL (ref 3.5–5)
ALP SERPL-CCNC: 81 U/L (ref 46–116)
ALT SERPL W P-5'-P-CCNC: 18 U/L (ref 12–78)
ANION GAP SERPL CALCULATED.3IONS-SCNC: 12 MMOL/L (ref 4–13)
AST SERPL W P-5'-P-CCNC: 23 U/L (ref 5–45)
BASOPHILS # BLD AUTO: 0 THOUSANDS/ΜL (ref 0–0.1)
BASOPHILS NFR BLD AUTO: 0 % (ref 0–1)
BILIRUB SERPL-MCNC: 0.2 MG/DL (ref 0.2–1)
BUN SERPL-MCNC: 34 MG/DL (ref 5–25)
CALCIUM ALBUM COR SERPL-MCNC: 10.1 MG/DL (ref 8.3–10.1)
CALCIUM SERPL-MCNC: 9.1 MG/DL (ref 8.3–10.1)
CHLORIDE SERPL-SCNC: 104 MMOL/L (ref 100–108)
CO2 SERPL-SCNC: 24 MMOL/L (ref 21–32)
CREAT SERPL-MCNC: 1.46 MG/DL (ref 0.6–1.3)
CRP SERPL QL: 14.8 MG/L
D DIMER PPP FEU-MCNC: 0.57 UG/ML FEU
EOSINOPHIL # BLD AUTO: 0 THOUSAND/ΜL (ref 0–0.61)
EOSINOPHIL NFR BLD AUTO: 0 % (ref 0–6)
ERYTHROCYTE [DISTWIDTH] IN BLOOD BY AUTOMATED COUNT: 16.2 % (ref 11.6–15.1)
GFR SERPL CREATININE-BSD FRML MDRD: 33 ML/MIN/1.73SQ M
GLUCOSE SERPL-MCNC: 108 MG/DL (ref 65–140)
HCT VFR BLD AUTO: 46.2 % (ref 34.8–46.1)
HGB BLD-MCNC: 14 G/DL (ref 11.5–15.4)
IMM GRANULOCYTES # BLD AUTO: 0.02 THOUSAND/UL (ref 0–0.2)
IMM GRANULOCYTES NFR BLD AUTO: 1 % (ref 0–2)
LYMPHOCYTES # BLD AUTO: 0.61 THOUSANDS/ΜL (ref 0.6–4.47)
LYMPHOCYTES NFR BLD AUTO: 22 % (ref 14–44)
MCH RBC QN AUTO: 25.2 PG (ref 26.8–34.3)
MCHC RBC AUTO-ENTMCNC: 30.3 G/DL (ref 31.4–37.4)
MCV RBC AUTO: 83 FL (ref 82–98)
MONOCYTES # BLD AUTO: 0.36 THOUSAND/ΜL (ref 0.17–1.22)
MONOCYTES NFR BLD AUTO: 13 % (ref 4–12)
NEUTROPHILS # BLD AUTO: 1.74 THOUSANDS/ΜL (ref 1.85–7.62)
NEUTS SEG NFR BLD AUTO: 64 % (ref 43–75)
NRBC BLD AUTO-RTO: 0 /100 WBCS
PLATELET # BLD AUTO: 219 THOUSANDS/UL (ref 149–390)
PMV BLD AUTO: 12.2 FL (ref 8.9–12.7)
POTASSIUM SERPL-SCNC: 4.4 MMOL/L (ref 3.5–5.3)
PROT SERPL-MCNC: 6.8 G/DL (ref 6.4–8.2)
RBC # BLD AUTO: 5.56 MILLION/UL (ref 3.81–5.12)
SODIUM SERPL-SCNC: 140 MMOL/L (ref 136–145)
WBC # BLD AUTO: 2.73 THOUSAND/UL (ref 4.31–10.16)

## 2021-02-26 PROCEDURE — 80053 COMPREHEN METABOLIC PANEL: CPT | Performed by: INTERNAL MEDICINE

## 2021-02-26 PROCEDURE — 94761 N-INVAS EAR/PLS OXIMETRY MLT: CPT

## 2021-02-26 PROCEDURE — 85379 FIBRIN DEGRADATION QUANT: CPT | Performed by: INTERNAL MEDICINE

## 2021-02-26 PROCEDURE — 85025 COMPLETE CBC W/AUTO DIFF WBC: CPT | Performed by: INTERNAL MEDICINE

## 2021-02-26 PROCEDURE — 86140 C-REACTIVE PROTEIN: CPT | Performed by: INTERNAL MEDICINE

## 2021-02-26 PROCEDURE — 99239 HOSP IP/OBS DSCHRG MGMT >30: CPT | Performed by: STUDENT IN AN ORGANIZED HEALTH CARE EDUCATION/TRAINING PROGRAM

## 2021-02-26 RX ORDER — ALBUTEROL SULFATE 2.5 MG/3ML
2.5 SOLUTION RESPIRATORY (INHALATION) EVERY 4 HOURS PRN
Qty: 30 VIAL | Refills: 0 | Status: SHIPPED | OUTPATIENT
Start: 2021-02-26

## 2021-02-26 RX ORDER — ZINC SULFATE 50(220)MG
220 CAPSULE ORAL DAILY
Qty: 3 CAPSULE | Refills: 0 | Status: SHIPPED | OUTPATIENT
Start: 2021-02-27 | End: 2021-03-02

## 2021-02-26 RX ORDER — MELATONIN
2000 DAILY
Qty: 6 TABLET | Refills: 0 | Status: SHIPPED | OUTPATIENT
Start: 2021-02-27

## 2021-02-26 RX ORDER — DEXAMETHASONE 6 MG/1
6 TABLET ORAL
Qty: 6 TABLET | Refills: 0 | Status: SHIPPED | OUTPATIENT
Start: 2021-02-26

## 2021-02-26 RX ADMIN — BUDESONIDE AND FORMOTEROL FUMARATE DIHYDRATE 2 PUFF: 160; 4.5 AEROSOL RESPIRATORY (INHALATION) at 08:17

## 2021-02-26 RX ADMIN — ASPIRIN 81 MG: 81 TABLET, CHEWABLE ORAL at 08:16

## 2021-02-26 RX ADMIN — CITALOPRAM HYDROBROMIDE 20 MG: 20 TABLET ORAL at 08:16

## 2021-02-26 RX ADMIN — OXYCODONE HYDROCHLORIDE AND ACETAMINOPHEN 1000 MG: 500 TABLET ORAL at 08:16

## 2021-02-26 RX ADMIN — ZINC SULFATE 220 MG (50 MG) CAPSULE 220 MG: CAPSULE at 08:16

## 2021-02-26 RX ADMIN — LISINOPRIL 20 MG: 20 TABLET ORAL at 08:16

## 2021-02-26 RX ADMIN — DEXAMETHASONE SODIUM PHOSPHATE 6 MG: 4 INJECTION, SOLUTION INTRAMUSCULAR; INTRAVENOUS at 14:55

## 2021-02-26 RX ADMIN — LEVOTHYROXINE SODIUM 88 MCG: 88 TABLET ORAL at 05:34

## 2021-02-26 RX ADMIN — HEPARIN SODIUM 7500 UNITS: 5000 INJECTION INTRAVENOUS; SUBCUTANEOUS at 14:56

## 2021-02-26 RX ADMIN — REMDESIVIR 100 MG: 100 INJECTION, POWDER, LYOPHILIZED, FOR SOLUTION INTRAVENOUS at 14:55

## 2021-02-26 RX ADMIN — Medication 2000 UNITS: at 08:16

## 2021-02-26 RX ADMIN — GUAIFENESIN 600 MG: 600 TABLET ORAL at 08:16

## 2021-02-26 RX ADMIN — AMLODIPINE BESYLATE 5 MG: 5 TABLET ORAL at 08:16

## 2021-02-26 RX ADMIN — FAMOTIDINE 10 MG: 20 TABLET ORAL at 08:16

## 2021-02-26 RX ADMIN — HEPARIN SODIUM 7500 UNITS: 5000 INJECTION INTRAVENOUS; SUBCUTANEOUS at 05:33

## 2021-02-26 NOTE — CASE MANAGEMENT
Pt to be discharged home today with son transporting at 16:30  Pt qualified for home O2 and portable tank along with nebulizer taken to bedside from [facility] closet  CM has been in contact with Arlene from [facility] who oks discharge at 16:30 to allow for concentrator delivery

## 2021-02-26 NOTE — RESPIRATORY THERAPY NOTE
Home Oxygen Qualifying Test       Patient name: Keshav Raman        : 1935   Date of Test:  2021  Diagnosis:      Home Oxygen Test:    **Medicare Guidelines require item(s) 1-5 on all ambulatory patients or 1 and 2 on non-ambulatory patients  1   Baseline SPO2 on Room Air at rest 90 %  2   SPO2 during exercise on Room Air 82 %  During exercise monitor SpO2  If SPO2 increases >=89% with ambulation do not add supplemental             oxygen  If <= 88% on room air add O2 via NC and titrate patient  Patient must be ambulated with O2 and titrated to > 88% with exertion  3   SPO2 on Oxygen at rest 95 % 2 lpm     4   SPO2 during exercise on Oxygen  92% a liter flow of 3 lpm     5   Exercise performed:          walking, duration 6 (min), distance 120 (feet)          [x]  Supplemental Home Oxygen is indicated  []  Client does not qualify for home oxygen  Respiratory Additional Notes- pt needs to have 3 L O2 when walking to maintain sats of 92%  Her sats on RA at rest are 90% so she does not need O2 when resting      [place], RT

## 2021-02-26 NOTE — PLAN OF CARE
Problem: PAIN - ADULT  Goal: Verbalizes/displays adequate comfort level or baseline comfort level  Description: Interventions:  - Encourage patient to monitor pain and request assistance  - Assess pain using appropriate pain scale  - Administer analgesics based on type and severity of pain and evaluate response  - Implement non-pharmacological measures as appropriate and evaluate response  - Consider cultural and social influences on pain and pain management  - Notify physician/advanced practitioner if interventions unsuccessful or patient reports new pain  Outcome: Progressing     Problem: INFECTION - ADULT  Goal: Absence or prevention of progression during hospitalization  Description: INTERVENTIONS:  - Assess and monitor for signs and symptoms of infection  - Monitor lab/diagnostic results  - Monitor all insertion sites, i e  indwelling lines, tubes, and drains  - Monitor endotracheal if appropriate and nasal secretions for changes in amount and color  - [place] appropriate cooling/warming therapies per order  - Administer medications as ordered  - Instruct and encourage patient and family to use good hand hygiene technique  - Identify and instruct in appropriate isolation precautions for identified infection/condition  Outcome: Progressing  Goal: Absence of fever/infection during neutropenic period  Description: INTERVENTIONS:  - Monitor WBC    Outcome: Progressing     Problem: SAFETY ADULT  Goal: Patient will remain free of falls  Description: INTERVENTIONS:  - Assess patient frequently for physical needs  -  Identify cognitive and physical deficits and behaviors that affect risk of falls    -  [place] fall precautions as indicated by assessment   - Educate patient/family on patient safety including physical limitations  - Instruct patient to call for assistance with activity based on assessment  - Modify environment to reduce risk of injury  - Consider OT/PT consult to assist with strengthening/mobility  Outcome: Progressing  Goal: Maintain or return to baseline ADL function  Description: INTERVENTIONS:  -  Assess patient's ability to carry out ADLs; assess patient's baseline for ADL function and identify physical deficits which impact ability to perform ADLs (bathing, care of mouth/teeth, toileting, grooming, dressing, etc )  - Assess/evaluate cause of self-care deficits   - Assess range of motion  - Assess patient's mobility; develop plan if impaired  - Assess patient's need for assistive devices and provide as appropriate  - Encourage maximum independence but intervene and supervise when necessary  - Involve family in performance of ADLs  - Assess for home care needs following discharge   - Consider OT consult to assist with ADL evaluation and planning for discharge  - Provide patient education as appropriate  Outcome: Progressing  Goal: Maintain or return mobility status to optimal level  Description: INTERVENTIONS:  - Assess patient's baseline mobility status (ambulation, transfers, stairs, etc )    - Identify cognitive and physical deficits and behaviors that affect mobility  - Identify mobility aids required to assist with transfers and/or ambulation (gait belt, sit-to-stand, lift, walker, cane, etc )  - [place] fall precautions as indicated by assessment  - Record patient progress and toleration of activity level on Mobility SBAR; progress patient to next Phase/Stage  - Instruct patient to call for assistance with activity based on assessment  - Consider rehabilitation consult to assist with strengthening/weightbearing, etc   Outcome: Progressing     Problem: DISCHARGE PLANNING  Goal: Discharge to home or other facility with appropriate resources  Description: INTERVENTIONS:  - Identify barriers to discharge w/patient and caregiver  - Arrange for needed discharge resources and transportation as appropriate  - Identify discharge learning needs (meds, wound care, etc )  - Arrange for interpretive services to assist at discharge as needed  - Refer to Case Management Department for coordinating discharge planning if the patient needs post-hospital services based on physician/advanced practitioner order or complex needs related to functional status, cognitive ability, or social support system  Outcome: Progressing     Problem: Knowledge Deficit  Goal: Patient/family/caregiver demonstrates understanding of disease process, treatment plan, medications, and discharge instructions  Description: Complete learning assessment and assess knowledge base  Interventions:  - Provide teaching at level of understanding  - Provide teaching via preferred learning methods  Outcome: Progressing     Problem: Potential for Falls  Goal: Patient will remain free of falls  Description: INTERVENTIONS:  - Assess patient frequently for physical needs  -  Identify cognitive and physical deficits and behaviors that affect risk of falls    -  [place] fall precautions as indicated by assessment   - Educate patient/family on patient safety including physical limitations  - Instruct patient to call for assistance with activity based on assessment  - Modify environment to reduce risk of injury  - Consider OT/PT consult to assist with strengthening/mobility  Outcome: Progressing     Problem: Prexisting or High Potential for Compromised Skin Integrity  Goal: Skin integrity is maintained or improved  Description: INTERVENTIONS:  - Identify patients at risk for skin breakdown  - Assess and monitor skin integrity  - Assess and monitor nutrition and hydration status  - Monitor labs   - Assess for incontinence   - Turn and reposition patient  - Assist with mobility/ambulation  - Relieve pressure over bony prominences  - Avoid friction and shearing  - Provide appropriate hygiene as needed including keeping skin clean and dry  - Evaluate need for skin moisturizer/barrier cream  - Collaborate with interdisciplinary team   - Patient/family teaching  - Consider wound care consult   Outcome: Progressing

## 2021-02-26 NOTE — ASSESSMENT & PLAN NOTE
Patient presents with shortness of breath and fatigue, associated with nausea and vomiting, loose to watery diarrhea  Patient denies cough, fever, chills  Patient is not on oxygen at home  Patient lives at home and denies any COVID contacts  COVID-19 test was positive at ED  CT chest showed Bilateral multi lobar pneumonia compatible with confirmed Covid 19 infection    Patient currently needs 1 L of oxygen  D-dimer 1 24 -> 0 57  Ferritin within normal range  CRP 38 3 ->14 8  procalcitonin 0 06->0 05  WBC 3 72->2 99->2 71  Home O2 evaluation showed patient needs 2 L at rest and 3 L at exertion  continue to treat patient with COVID-19 mild pathway  day 4 of remdesivir and dexamethasone  Patient is clinically and hemodynamically stable for discharge today  Order oxygen  Advised patient to follow-up with PCP in 3 days  Recheck CBC in a week

## 2021-02-26 NOTE — ASSESSMENT & PLAN NOTE
Patient complained of intermittent epigastric pain, non-radiating  Epigastric pain has improved  Had history of ulcer    Troponin was negative at ED  Was on omeprazole 20 mg p o  Q d  at home  Continue to treat with famotidine per COVID-19 treatment pathway  Outpatient GI follow-up

## 2021-02-26 NOTE — DISCHARGE INSTR - AVS FIRST PAGE
Please follow-up with PCP in 3-5 days  Please recheck CBC and BMP in a week  Please stop taking lisinopril until you follow up with PCP to determine when to restart  Please monitor your blood pressure regularly  Also recommended to have repeat chest imaging your primary care provider in 6-8 weeks to monitor resolution

## 2021-02-26 NOTE — DISCHARGE INSTRUCTIONS
COVID-19 and Chronic Health Conditions   WHAT YOU NEED TO KNOW:   Your chronic condition may increase your risk for COVID-19 or serious problems it can cause, such as pneumonia  Healthcare providers might need to make changes that affect how you usually manage your chronic health condition  Providers may change hours of operation or not have patients come in to be seen  You may not be able to make appointments to get blood drawn or to have tests or procedures  This may continue until the virus that causes COVID-19 is controlled  Until then, you can take steps to manage your condition  The steps will also lower your risk for COVID-19 or the serious problems it causes  DISCHARGE INSTRUCTIONS:   If you think you may be infected with the new coronavirus,  do the following to protect others:  · If emergency care is needed,  tell the  about the possible infection, or call ahead and tell the emergency department  · Call a healthcare provider  for instructions if symptoms are mild  Do not  arrive without calling first  Your provider will need to protect staff members and other patients  · Cover your mouth and nose  while you are getting medical care  This will help lower the risk of infecting others  Call your local emergency number [phone] in the [address] [place] [address],[address]) or emergency department if:   · You have trouble breathing or shortness of breath  · You have chest pain or pressure that lasts longer than 5 minutes  · You become confused or hard to wake  · Your lips or face are blue  · You have a fever of 104°F (40°C) or higher  Call your doctor or healthcare provider if:   · You do not  have symptoms of COVID-19 but had close physical contact within 14 days with someone who tested positive  · You have questions or concerns about your COVID-19 or your chronic condition      The following may increase your risk for serious effects of COVID-19:   · Age 72 years or older    · Obesity, diabetes, cancer, or a liver disease    · Kidney failure, chronic kidney disease, or sickle cell disease    · Lung disease, COPD, moderate-to-severe asthma, cystic fibrosis, or pulmonary fibrosis (scarring in your lungs)    · A severe heart disease, such as coronary artery disease or heart failure    · A brain blood vessel disorder or disease, or a condition such as dementia    · Living in a nursing home or long-term care facility    · Smoking cigarettes    · Hypertension (high blood pressure) or thalassemia    · An immune system problem, HIV or AIDS, or a blood or bone marrow transplant    · Long-term use of certain medicines, such as corticosteroids    · Pregnancy    Manage your chronic health condition during this time:  If you do not have a regular healthcare provider, experts recommend you contact a local [facility] health center or health department  The following can help you manage your condition and prevent COVID-19:  · Get emergency care for your condition if needed  Talk to your healthcare providers about symptoms of your chronic condition that need immediate care  Your providers can help you create a plan or add exacerbation management to your plan  The plan will include when to go to an emergency department and when to call your local emergency number  This will depend on where you live and the services that are available during this time  · Go to dialysis appointments as scheduled  It is important to stay on schedule  You will need to have enough food to be able to follow the emergency diet plan if you must miss a session  The emergency diet needs to be part of the management plan for your condition  · Reschedule any upcoming appointments as needed  Medical facilities may be closed until the new coronavirus is better controlled  This means you may need to reschedule a surgery, procedure, or check-up appointment  If you cannot have a phone or video appointment, you will need to make a new appointment   Some providers may be scheduling appointments several months in advance  Some surgeries and procedures will happen as scheduled  This depends on the medical condition and the reason for the surgery or procedure  You may need to have extra testing for COVID-19 several days before  · Follow any regular management plan you use  Your healthcare provider will tell you if you need to make any changes to your regular management plan  For example, if you have asthma, continue to follow your asthma action plan  If you have diabetes, you may need to check your blood sugar level more often  Stress and illness can make blood sugar levels go up  You may need to adjust medicine such as insulin  If you have a heart condition or high blood pressure, you may need to check your blood pressure more often  Stress and illness can also raise your blood pressure  · Talk to your healthcare providers about your medicines  You may be able to get more than 1 month of medicine at a time  This will lower the number of times you need to go to a pharmacy to get your medicines  Make sure you have enough medicine if you have a condition that can lead to an emergency  Examples include asthma medicines, insulin, or an epinephrine pen  Check the expiration dates on the medicines you currently have  Ask for refills as soon as possible, if needed  If it is not time to refill prescriptions, you may be able to get an emergency supply of some medicines  Medicine plans vary, so ask your healthcare provider or pharmacist for options  · Have supplies available in your home  If possible, get extra supplies you use regularly  Examples include absorbent pads, syringes, and wound cleaning solutions  This will limit the number of trips out of your home to get supplies  · Know the signs and symptoms of COVID-19  Signs and symptoms usually start about 5 days after infection but can take 2 to 14 days  Signs and symptoms range from mild to severe   You may feel like you have the flu or a bad cold  Your chronic health condition may cause some of the same symptoms COVID-19 causes  This can make it hard to know if a symptom is from COVID-19 or your chronic condition  Keep a record of any new or worsening symptom you have  This is especially important if you have a condition that often causes shortness of breath  Your provider can tell you if you should be tested for COVID-19  Information is still being learned  Tell your healthcare provider if you think you were infected but develop signs or symptoms not listed below:    ? A cough    ? Shortness of breath or trouble breathing that may become severe    ? A fever of at least 100 4°F, or 38°C (may be lower in adults 65 or older)    ? Chills that might include shaking    ? Muscle pain, body aches, or a headache    ? A sore throat    ? Suddenly not being able to taste or smell anything    ? Feeling very tired (fatigue)    ? Congestion (stuffy head and nose), or a runny nose    ? Diarrhea, nausea, or vomiting    What you can do to prevent having to go out of your home during this time:   · Ask your healthcare provider for other ways to have appointments  You may be able to have appointments without having to go into the provider's office  Some providers offer phone, video, or other types of appointments  · Have food, medicines, and other supplies delivered  Some pharmacies can send certain medicines to you through the mail  Grocery stores and restaurants may be able to deliver food and other items  If possible, have delivered items placed somewhere  Try not to have someone hand you an item  You will be so close to the person that the virus can spread between you  Wash your hands after you put the delivered items away  · Ask someone to get items you need  The person can get groceries, medicines, or other needed items for you  Choose a person who does not have signs or symptoms of COVID-19 or has tested negative for it   The person should not be waiting for test results  He or she should not have a condition that increases the risk for COVID-19 or serious problems it causes  Lower your risk for COVID-19:  The virus that causes COVID-19 spreads quickly and easily  It mainly spreads through droplets that form when a person talks, coughs, or sneezes  An infected person may also be able to leave the virus on objects and surfaces  Another person can get the virus on his or her hands by touching the object or surface  Infection happens if the person then touches his or her eyes or mouth with unwashed hands  The best way to prevent infection is to avoid anyone who is infected, but this can be hard to do  An infected person can spread the virus before signs or symptoms begin, or even if signs or symptoms never develop  The following are ways to prevent the spread of the virus and lower your risk for COVID-19:      · Wash your hands often throughout the day  Use soap and water  Rub your soapy hands together, lacing your fingers  Wash the front and back of each hand, and in between your fingers  Use the fingers of one hand to scrub under the fingernails of the other hand  Wash for at least 20 seconds  Rinse with warm, running water for several seconds  Then dry your hands with a clean towel or paper towel  Use hand  that contains alcohol if soap and water are not available  Do not touch your eyes, nose, or mouth without washing your hands first  Teach children how to wash their hands  · Cover sneezes and coughs  Turn your face away and cover your mouth and nose with a tissue  Throw the tissue away  Use the bend of your arm if a tissue is not available  Then wash your hands well with soap and water or use hand   Turn your head and cover your face if someone near you is coughing or sneezing  Teach children how to cover a cough or sneeze  Remind them to wash their hands  · Make a habit of not touching your face    If you get the virus on your hands, you can transfer it to your eyes, nose, or mouth and become infected  · Clean and disinfect high-touch surfaces and objects in your home often  Do this regularly, even if you think no one living in or coming to your home is infected with the virus  Surfaces include countertops, cupboard doors, desks, handles, handrails, doorknobs, toilet handles, faucets, chairs, and light switches  Objects include keys, phones, computer keyboards and mice, video games, remote controls, and children's toys  Some surfaces and objects may need to be cleaned several times each day, depending on how often they are used  ? Use disinfecting wipes or a disinfecting solution  You can make a solution by mixing 4 teaspoons of bleach with 1 quart (4 cups) of water  Clean with a sponge or cloth that can be thrown away or washed in hot, soapy water and reused  Clean used dishes and utensils in hot, soapy water or in the   ? Be careful with   Do not use any cleaning or disinfecting products that can trigger an asthma attack or other breathing problems  Open windows or have circulating air as you clean  Do not  mix ammonia with bleach  This will create toxic fumes  Read the labels of all products you use  · Ask about vaccines you may need  No vaccine is available yet for the new coronavirus  It is still a good idea to get other vaccines to help protect your immune system  Get the influenza (flu) vaccine as soon as recommended each year, usually starting in September or October  A flu infection can make COVID-19 worse if you have them at the same time  The flu can also cause signs and symptoms that are similar to COVID-19  Get the pneumonia vaccine if recommended  Your healthcare provider can tell you if you also need other vaccines, and when to get them    Follow social distancing guidelines if you must go out of your home during this time:  Social distancing means people stay far enough apart physically that the virus cannot spread from one person to another  National and local social distancing rules vary  Rules may change over time as restrictions are lifted, but they may return if an outbreak happens where you live  It is important to know and follow all current social distancing rules in your area  The following are general guidelines:  · Limit trips out of your home  Most local guidelines allow leaving the home to buy food or supplies, or to seek medical care if necessary  · Stay at least 6 feet (2 meters) away from anyone who does not live in your home  Do not shake hands with, hug, or kiss a person as a greeting  Stand or walk as far from others as possible, especially around anyone who is sneezing or coughing  If you must use public transportation (such as a bus or subway), try to sit or stand away from others  You can stay safely connected with others through phone calls, e-mail messages, social media websites, and video chats  Check in on anyone who may be having a hard time socially distancing, or who lives alone  Ask administrators at nursing homes or long-term care facilities how you can safely communicate with someone living there  · Wear a cloth face covering (mask) when you must go out  Only do this if your chronic condition does not cause breathing problems  You can make a face covering out of a thick cloth  This will save medical masks for healthcare workers and first responders  Choose fabric that holds its shape after it is washed and dried, such as cotton  Put the top half of a paper coffee filter in the middle of the fabric to create an extra filter for you  Check that you can breathe through the fabric when it is folded into several layers  Fold the fabric into a long band  Put each end through a rubber band or hair tie to create ear loops  Fold the ends of the fabric toward the middle  Hold the covering to your face   Adjust it so it covers your nose and mouth completely  Hook the loops onto your ears to keep the covering in place  The following are important points to remember:     ? Do not use a plastic face shield instead of a cloth covering  A face shield will not protect others from droplets  If a face shield must be used, choose one that wraps around both sides of the face and goes below the chin  Do not use disposable shields more than 1 time  [employer] [employer] that can be used again need to be cleaned and disinfected between uses  Do not put a face shield or a cloth covering on a  or infant  These increase the risk for sudden infant death syndrome (SIDS)  ? Continue social distancing while you are out  A face covering does not mean you can have close physical contact with others  You still need to stay at least 6 feet (2 meters) away from others  ? Do not touch your face covering or eyes while you are wearing the covering  Your eyes will not be covered by the cloth  You can be infected if the virus is on your hand and you touch your eyes  Wash your hands with soap and water for 20 seconds or use hand  before you remove your face covering  ? Do not remove your face covering to talk, sneeze, or cough  Your face covering will help protect you and others around you  ? Wash face coverings often  Wash them in a washing machine in the warmest water the fabric allows  Make sure the fabric is completely dry before you use the face covering again  Only wear clean coverings when you go out  Use a new coffee filter each time  ? Certain individuals should not use face coverings  Do not put a face covering on anyone who is younger than 2 years  Ora Campbellhew children may not be able to breathe through the covering  Do not put a face covering on anyone who cannot remove it by himself or herself  ? You can use a clear face covering if others need to read your lips    A clear covering may be helpful if you communicate with someone who is deaf or hard of hearing  A clear covering is made of cloth but has plastic over the mouth area  This will help the person see your lips more easily  Do not use a plastic face shield instead  ? Do not use face coverings that have breathing valves or vents  Valves or vents on the sides are designed to allow breathed air to go out  This makes breathing easier, but the virus can travel out of the valve or vent and be spread to others  · Do not have anyone over to your home unless it is necessary  It is okay to have medical workers in to provide care  Wear your face covering, and remind medical workers to wear a mask or face covering  Remind them to wash their hands when they arrive and before they leave  Do not  have family members, friends, or neighbors over, even if they are not sick  A person can pass the new virus to others before symptoms of COVID-19 begin  Some people never even develop symptoms  Children commonly have mild symptoms or no symptoms  It is important that you continue social distancing with everyone, including children  It may be hard to tell a child not to hug or kiss you  Explain that this is how he or she can help you stay healthy  · Do not go to someone else's home unless it is necessary  Do not go over to visit, even if the person is lonely  Only go if you need to help him or her  · Avoid large gatherings and crowds  Gatherings or crowds of 10 or more individuals can cause the virus to spread  Examples of gatherings include parties, sporting events, [employer] services, and conferences  Crowds may form at beaches, [place], and tourist attractions  You can continue to protect yourself by staying away from large gatherings and crowds  · Stay safe if you must go out to work  You may have a job only done outside your home that is considered essential  Keep physical distance between you and other workers as much as possible  Follow your employer's rules so everyone stays safe      Help strengthen your immune system:   · Do not smoke  Nicotine and other chemicals in cigarettes and cigars can cause lung damage and increase your risk for infections such as bronchitis or pneumonia  Ask your healthcare provider for information if you currently smoke and need help to quit  E-cigarettes or smokeless tobacco still contain nicotine  Talk to your healthcare provider before you use these products  · Eat a variety of healthy foods  Examples include vegetables, fruits, whole-grain breads and cereals, lean meats and poultry, fish, low-fat dairy products, and cooked beans  Healthy foods contain nutrients that help keep your immune system strong  · Find ways to manage stress  You may be feeling more stressed than usual because of the COVID-19 outbreak  The situation is very stressful to many people  Talk to your healthcare providers about ways to manage stress during this time  Stress can lead to breathing problems or make the problems worse  Stress can trigger an attack or exacerbation of many health conditions  It is important to do things that help you feel more relaxed, such as the following:     ? Pick 1 or 2 times a day to watch the news  Constant news watching can increase your stress levels  ? Talk to a friend on the phone or through a video chat  ? Take a warm, soothing bath  ? Listen to music  ? Exercise can also help relieve stress  This may be hard if your regular gym or outdoor exercise area is closed  If you do not have exercise equipment at home, try walking inside your home  You can walk quickly or turn on music and dance  Follow up with your doctor or healthcare provider as directed: Your providers will tell you when you can come in for tests, procedures, or check-ups  Bring your symptom record with you to all appointments  Write down your questions so you remember to ask them during your visits    For more information:   · Centers for Disease Control and Prevention  [address]   [place] , [address]  Phone: 4- 257 - [phone]  Phone: 3- 166 - [phone]  Web Address: Angel[employer] br    © Copyright [address] Information is for End User's use only and may not be sold, redistributed or otherwise used for commercial purposes  All illustrations and images included in CareNotes® are the copyrighted property of [employer] [address]  or Kareem Cruz  The above information is an  only  It is not intended as medical advice for individual conditions or treatments  Talk to your doctor, nurse or pharmacist before following any medical regimen to see if it is safe and effective for you  [address]    Your healthcare provider and/or public health staff have evaluated you and have determined that you do not need to remain in the hospital at this time  At this time you can be isolated at home where you will be monitored by staff from your local or state health department  You should carefully follow the prevention and isolation steps below until a healthcare provider or local or state health department says that you can return to your normal activities  Stay home except to get medical care    People who are mildly ill with COVID-19 are able to isolate at home during their illness  You should restrict activities outside your home, except for getting medical care  Do not go to work, school, or public areas  Avoid using public transportation, ride-sharing, or taxis  Separate yourself from other people and animals in your home    People: As much as possible, you should stay in a specific room and away from other people in your home  Also, you should use a separate bathroom, if available  Animals: You should restrict contact with pets and other animals while you are sick with COVID-19, just like you would around other people   Although there have not been reports of pets or other animals becoming sick with COVID-19, it is still recommended that people sick with COVID-19 limit contact with animals until more information is known about the virus  When possible, have another member of your household care for your animals while you are sick  If you are sick with COVID-19, avoid contact with your pet, including petting, snuggling, being kissed or licked, and sharing food  If you must care for your pet or be around animals while you are sick, wash your hands before and after you interact with pets and wear a facemask  See COVID-19 and Animals for more information  Call ahead before visiting your doctor    If you have a medical appointment, call the healthcare provider and tell them that you have or may have COVID-19  This will help the healthcare providers office take steps to keep other people from getting infected or exposed  Wear a facemask    You should wear a facemask when you are around other people (e g , sharing a room or vehicle) or pets and before you enter a healthcare providers office  If you are not able to wear a facemask (for example, because it causes trouble breathing), then people who live with you should not stay in the same room with you, or they should wear a facemask if they enter your room  Cover your coughs and sneezes    Cover your mouth and nose with a tissue when you cough or sneeze  Throw used tissues in a lined trash can  Immediately wash your hands with soap and water for at least 20 seconds or, if soap and water are not available, clean your hands with an alcohol-based hand  that contains at least 60% alcohol  Clean your hands often    Wash your hands often with soap and water for at least 20 seconds, especially after blowing your nose, coughing, or sneezing; going to the bathroom; and before eating or preparing food   If soap and water are not readily available, use an alcohol-based hand  with at least 60% alcohol, covering all surfaces of your hands and rubbing them together until they feel dry   Soap and water are the best option if hands are visibly dirty  Avoid touching your eyes, nose, and mouth with unwashed hands  Avoid sharing personal household items    You should not share dishes, drinking glasses, cups, eating utensils, towels, or bedding with other people or pets in your home  After using these items, they should be washed thoroughly with soap and water  Clean all high-touch surfaces everyday    High touch surfaces include counters, tabletops, doorknobs, bathroom fixtures, toilets, phones, keyboards, tablets, and bedside tables  Also, clean any surfaces that may have blood, stool, or body fluids on them  Use a household cleaning spray or wipe, according to the label instructions  Labels contain instructions for safe and effective use of the cleaning product including precautions you should take when applying the product, such as wearing gloves and making sure you have good ventilation during use of the product  Monitor your symptoms    Seek prompt medical attention if your illness is worsening (e g , difficulty breathing)  Before seeking care, call your healthcare provider and tell them that you have, or are being evaluated for, COVID-19  Put on a facemask before you enter the facility  These steps will help the healthcare providers office to keep other people in the office or waiting room from getting infected or exposed  Ask your healthcare provider to call the local or state health department  Persons who are placed under active monitoring or facilitated self-monitoring should follow instructions provided by their local health department or occupational health professionals, as appropriate  If you have a medical emergency and need to call 911, notify the dispatch personnel that you have, or are being evaluated for COVID-19  If possible, put on a facemask before emergency medical services arrive      Discontinuing home isolation    Patients with confirmed COVID-19 should remain under home isolation precautions until the following conditions are met:   - They have had no fever for at least 24 hours (that is one full day of no fever without the use medicine that reduces fevers)  AND  - other symptoms have improved (for example, when their cough or shortness of breath have improved)  AND  - If had mild or moderate illness, at least 10 days have passed since their symptoms first appeared or if severe illness (needed oxygen) or immunosuppressed, at least 20 days have passed since symptoms first appeared  Patients with confirmed COVID-19 should also notify close contacts (including their workplace) and ask that they self-quarantine  Currently, close contact is defined as being within 6 feet for 15 minutes or more from the period 24 hours starting 48 hours before symptom onset to the time at which the patient went into isolation  Close contacts of patients diagnosed with COVID-19 should be instructed by the patient to self-quarantine for 14 days from the last time of their last contact with the patient       Source: [employer]

## 2021-02-26 NOTE — ASSESSMENT & PLAN NOTE
Blood pressure is well controlled  Continue home medication amlodipine   Hold lisinopril due to increased creatinine  Advised patient to follow-up with PCP in a week and repeat BMP

## 2021-02-26 NOTE — ASSESSMENT & PLAN NOTE
History of CKD, baseline creatinine at 1 3  Creatinine was 1 62 on presentation,   repeat Cr was 1 45, stable  Patient was on ibuprofen for knee pains  Hold on ibuprofen and lisinopril  Advised patient to follow-up with PCP and repeat BMP in a week

## 2021-02-26 NOTE — DISCHARGE SUMMARY
Discharge- Nadir Suarez 1935, 82 y o  female MRN: [id number]    Unit/Bed#: -01 Encounter: [id number]    Primary Care Provider: Sandhya Vargas MD   Date and time admitted to hospital: 2/23/2021  5:48 AM        Epigastric pain  Assessment & Plan  Patient complained of intermittent epigastric pain, non-radiating  Epigastric pain has improved  Had history of ulcer  Troponin was negative at ED  Was on omeprazole 20 mg p o  Q d  at home  Continue to treat with famotidine per COVID-19 treatment pathway  Outpatient GI follow-up    COPD (chronic obstructive pulmonary disease) ([place] [address] )  Assessment & Plan  History of COPD on Symbicort  Not on oxygen at home  Currently patient is on 1 L of nasal cannula, SpO2 94%  PE:  Reduced breath sound bilaterally, wheezing at base, no crackles  Treat patient with Decadron per COVID-19 treatment pathway        KRISTYN (acute kidney injury) ([facility] )  Assessment & Plan  History of CKD, baseline creatinine at 1 3  Creatinine was 1 62 on presentation,   repeat Cr was 1 45, stable  Patient was on ibuprofen for knee pains  Hold on ibuprofen and lisinopril  Advised patient to follow-up with PCP and repeat BMP in a week    Essential hypertension  Assessment & Plan  Blood pressure is well controlled  Continue home medication amlodipine   Hold lisinopril due to increased creatinine  Advised patient to follow-up with PCP in a week and repeat BMP    * Pneumonia due to COVID-19 virus  Assessment & Plan  Patient presents with shortness of breath and fatigue, associated with nausea and vomiting, loose to watery diarrhea  Patient denies cough, fever, chills  Patient is not on oxygen at home  Patient lives at home and denies any COVID contacts  COVID-19 test was positive at ED  CT chest showed Bilateral multi lobar pneumonia compatible with confirmed Covid 19 infection    Patient currently needs 1 L of oxygen  D-dimer 1 24 -> 0 57  Ferritin within normal range  CRP 38 3 ->14 8  procalcitonin 0 06->0 05  WBC 3 72->2 99->2 71  Home O2 evaluation showed patient needs 2 L at rest and 3 L at exertion  continue to treat patient with COVID-19 mild pathway  day 4 of remdesivir and dexamethasone  Patient is clinically and hemodynamically stable for discharge today  Order oxygen  Advised patient to follow-up with PCP in 3 days  Recheck CBC in a week        Discharging Resident Physician: Flex Lindo MD  Attending: Charissa Moore MD  PCP: Jesus Espinal MD  Admission Date: 2/23/2021  Discharge Date: 02/26/21    Disposition:     Home with [facility] Services (Reminder: Complete face to face encounter)    Reason for Admission:  Shortness of breath    Consultations During Hospital Stay:  · None    Procedures Performed:     · None    Significant Findings / Test Results:     CT chest abdomen pelvis wo contrast   Final Result by Cornelia Liang MD (02/23 1349)      No evidence of aortic aneurysm  Nondiagnostic evaluation for possible dissection in the absence of intravenous contrast enhancement  Bilateral multi lobar pneumonia compatible with confirmed Covid 19 infection  Colonic diverticulosis without diverticulitis  Workstation performed: [id number]         ·     Incidental Findings:   · None     Test Results Pending at Discharge (will require follow up): · None     Outpatient Tests Requested:  · CBC and BMP    Complications:  None    Hospital Course:     Johnnie Domingo is a 82 y o  female patient who originally presented to the hospital on 2/23/2021 due to shortness of breath  Patient was admitted for pneumonia due to COVID-19  Patient was treated with COVID-19 mild pathway including remdesivir, dexamethasone and the vitamin D, C  Patient denies shortness of breath on 1L of nasal cannula  No fever, chills, chest pain, abdominal pain, diarrhea  Patient is stable for discharge today  Advised the patient to follow-up with PCP in a week to recheck CBC and BMP      Condition at Discharge: stable     Discharge Day Visit / Exam:     Subjective:  Patient states that epigastric pain has improved  Patient still has cough  Patient denies shortness breath on 1 L of nasal cannula, fever, chills, chest pain, abdominal pain  Has not have any bowel movements in past 2 days  Vitals: Blood Pressure: 121/63 (02/26/21 0832)  Pulse: (!) 52 (02/26/21 1217)  Temperature: 98 °F (36 7 °C) (02/26/21 0832)  Temp Source: Oral (02/25/21 1442)  Respirations: 18 (02/26/21 0832)  Weight - Scale: 96 9 kg (213 lb 10 oz) (02/23/21 0549)  SpO2: 90 % (02/26/21 1357)  Exam:   Physical Exam  Constitutional:       General: She is not in acute distress  Appearance: She is well-developed  She is obese  She is not diaphoretic  HENT:      Head: Normocephalic  Mouth/Throat:      Pharynx: No oropharyngeal exudate  Eyes:      Pupils: Pupils are equal, round, and reactive to light  Neck:      Musculoskeletal: Normal range of motion  Cardiovascular:      Rate and Rhythm: Normal rate and regular rhythm  Heart sounds: No murmur  Pulmonary:      Effort: Pulmonary effort is normal  No respiratory distress  Breath sounds: Examination of the right-lower field reveals decreased breath sounds  Examination of the left-lower field reveals decreased breath sounds  Decreased breath sounds and wheezing present  No rales  Comments: On 1 L of nasal cannula  Abdominal:      General: Bowel sounds are normal       Palpations: Abdomen is soft  Musculoskeletal: Normal range of motion  General: No tenderness  Skin:     General: Skin is warm  Neurological:      Mental Status: She is alert and oriented to person, place, and time  Discussion with Family:  Yes    Discharge instructions/Information to patient and family:   See after visit summary for information provided to patient and family        Provisions for Follow-Up Care:  See after visit summary for information related to follow-up care and any pertinent home health orders  Planned Readmission:  No     Discharge Medications:  See after visit summary for reconciled discharge medications provided to patient and family        ** Please Note: This note has been constructed using a voice recognition system **

## 2021-02-26 NOTE — ASSESSMENT & PLAN NOTE
History of COPD on Symbicort  Not on oxygen at home  Currently patient is on 1 L of nasal cannula, SpO2 94%  PE:  Reduced breath sound bilaterally, wheezing at base, no crackles  Treat patient with Decadron per COVID-19 treatment pathway

## 2021-02-26 NOTE — CASE MANAGEMENT
Pt has been accepted by [facility] and pt is agreeable with this  Understands that it is her preference  IMM reviewed with pt via phone  Copy to pt and copy to MR for scanning    She is asking for a nebulizer on d/c and CM will speak to SLIM>

## 2021-03-05 ENCOUNTER — LAB REQUISITION (OUTPATIENT)
Dept: LAB | Facility: HOSPITAL | Age: 86
End: 2021-03-05
Payer: MEDICARE

## 2021-03-05 DIAGNOSIS — U07.1 COVID-19: ICD-10-CM

## 2021-03-05 DIAGNOSIS — U07.1 PNEUMONIA DUE TO CORONAVIRUS DISEASE 2019: ICD-10-CM

## 2021-03-05 DIAGNOSIS — J12.82 PNEUMONIA DUE TO CORONAVIRUS DISEASE 2019: ICD-10-CM

## 2021-03-05 LAB
ANION GAP SERPL CALCULATED.3IONS-SCNC: 11 MMOL/L (ref 4–13)
BASOPHILS # BLD AUTO: 0.01 THOUSANDS/ΜL (ref 0–0.1)
BASOPHILS NFR BLD AUTO: 0 % (ref 0–1)
BUN SERPL-MCNC: 34 MG/DL (ref 5–25)
CALCIUM SERPL-MCNC: 8.8 MG/DL (ref 8.3–10.1)
CHLORIDE SERPL-SCNC: 104 MMOL/L (ref 100–108)
CO2 SERPL-SCNC: 24 MMOL/L (ref 21–32)
CREAT SERPL-MCNC: 1.52 MG/DL (ref 0.6–1.3)
EOSINOPHIL # BLD AUTO: 0 THOUSAND/ΜL (ref 0–0.61)
EOSINOPHIL NFR BLD AUTO: 0 % (ref 0–6)
ERYTHROCYTE [DISTWIDTH] IN BLOOD BY AUTOMATED COUNT: 16.8 % (ref 11.6–15.1)
GFR SERPL CREATININE-BSD FRML MDRD: 31 ML/MIN/1.73SQ M
GLUCOSE SERPL-MCNC: 133 MG/DL (ref 65–140)
HCT VFR BLD AUTO: 41.5 % (ref 34.8–46.1)
HGB BLD-MCNC: 12.8 G/DL (ref 11.5–15.4)
IMM GRANULOCYTES # BLD AUTO: 0.15 THOUSAND/UL (ref 0–0.2)
IMM GRANULOCYTES NFR BLD AUTO: 1 % (ref 0–2)
LYMPHOCYTES # BLD AUTO: 0.49 THOUSANDS/ΜL (ref 0.6–4.47)
LYMPHOCYTES NFR BLD AUTO: 4 % (ref 14–44)
MCH RBC QN AUTO: 25.2 PG (ref 26.8–34.3)
MCHC RBC AUTO-ENTMCNC: 30.8 G/DL (ref 31.4–37.4)
MCV RBC AUTO: 82 FL (ref 82–98)
MONOCYTES # BLD AUTO: 0.48 THOUSAND/ΜL (ref 0.17–1.22)
MONOCYTES NFR BLD AUTO: 4 % (ref 4–12)
NEUTROPHILS # BLD AUTO: 10.06 THOUSANDS/ΜL (ref 1.85–7.62)
NEUTS SEG NFR BLD AUTO: 91 % (ref 43–75)
NRBC BLD AUTO-RTO: 0 /100 WBCS
PLATELET # BLD AUTO: 285 THOUSANDS/UL (ref 149–390)
PMV BLD AUTO: 12.9 FL (ref 8.9–12.7)
POTASSIUM SERPL-SCNC: 4.8 MMOL/L (ref 3.5–5.3)
RBC # BLD AUTO: 5.07 MILLION/UL (ref 3.81–5.12)
SODIUM SERPL-SCNC: 139 MMOL/L (ref 136–145)
WBC # BLD AUTO: 11.19 THOUSAND/UL (ref 4.31–10.16)

## 2021-03-05 PROCEDURE — 80048 BASIC METABOLIC PNL TOTAL CA: CPT | Performed by: INTERNAL MEDICINE

## 2021-03-05 PROCEDURE — 85025 COMPLETE CBC W/AUTO DIFF WBC: CPT | Performed by: INTERNAL MEDICINE

## 2021-11-01 ENCOUNTER — APPOINTMENT (OUTPATIENT)
Dept: LAB | Facility: HOSPITAL | Age: 86
End: 2021-11-01
Payer: MEDICARE

## 2021-11-01 DIAGNOSIS — I10 ESSENTIAL HYPERTENSION, MALIGNANT: ICD-10-CM

## 2021-11-01 DIAGNOSIS — R10.84 GENERALIZED ABDOMINAL PAIN: ICD-10-CM

## 2021-11-01 LAB
CHOLEST SERPL-MCNC: 173 MG/DL (ref 50–200)
EST. AVERAGE GLUCOSE BLD GHB EST-MCNC: 117 MG/DL
HBA1C MFR BLD: 5.7 %
HDLC SERPL-MCNC: 58 MG/DL
LDLC SERPL CALC-MCNC: 94 MG/DL (ref 0–100)
NONHDLC SERPL-MCNC: 115 MG/DL
T4 FREE SERPL-MCNC: 0.83 NG/DL (ref 0.76–1.46)
TRIGL SERPL-MCNC: 104 MG/DL
TSH SERPL DL<=0.05 MIU/L-ACNC: 8.28 UIU/ML (ref 0.36–3.74)

## 2021-11-01 PROCEDURE — 84443 ASSAY THYROID STIM HORMONE: CPT

## 2021-11-01 PROCEDURE — 36415 COLL VENOUS BLD VENIPUNCTURE: CPT

## 2021-11-01 PROCEDURE — 80061 LIPID PANEL: CPT

## 2021-11-01 PROCEDURE — 84439 ASSAY OF FREE THYROXINE: CPT

## 2021-11-01 PROCEDURE — 83036 HEMOGLOBIN GLYCOSYLATED A1C: CPT

## 2022-06-04 ENCOUNTER — APPOINTMENT (OUTPATIENT)
Dept: LAB | Facility: HOSPITAL | Age: 87
End: 2022-06-04
Payer: MEDICARE

## 2022-06-04 DIAGNOSIS — N18.9 CHRONIC KIDNEY DISEASE, UNSPECIFIED CKD STAGE: ICD-10-CM

## 2022-06-04 LAB
ALBUMIN SERPL BCP-MCNC: 3.4 G/DL (ref 3.5–5)
ALP SERPL-CCNC: 126 U/L (ref 46–116)
ALT SERPL W P-5'-P-CCNC: 21 U/L (ref 12–78)
ANION GAP SERPL CALCULATED.3IONS-SCNC: 10 MMOL/L (ref 4–13)
AST SERPL W P-5'-P-CCNC: 15 U/L (ref 5–45)
BILIRUB SERPL-MCNC: 0.44 MG/DL (ref 0.2–1)
BUN SERPL-MCNC: 16 MG/DL (ref 5–25)
CALCIUM ALBUM COR SERPL-MCNC: 10 MG/DL (ref 8.3–10.1)
CALCIUM SERPL-MCNC: 9.5 MG/DL (ref 8.3–10.1)
CHLORIDE SERPL-SCNC: 103 MMOL/L (ref 100–108)
CO2 SERPL-SCNC: 26 MMOL/L (ref 21–32)
CREAT SERPL-MCNC: 1.47 MG/DL (ref 0.6–1.3)
GFR SERPL CREATININE-BSD FRML MDRD: 32 ML/MIN/1.73SQ M
GLUCOSE P FAST SERPL-MCNC: 105 MG/DL (ref 65–99)
HCT VFR BLD AUTO: 44 % (ref 34.8–46.1)
HGB BLD-MCNC: 13.5 G/DL (ref 11.5–15.4)
PHOSPHATE SERPL-MCNC: 3.3 MG/DL (ref 2.3–4.1)
POTASSIUM SERPL-SCNC: 4.3 MMOL/L (ref 3.5–5.3)
PROT SERPL-MCNC: 7.4 G/DL (ref 6.4–8.2)
SODIUM SERPL-SCNC: 139 MMOL/L (ref 136–145)

## 2022-06-04 PROCEDURE — 80053 COMPREHEN METABOLIC PANEL: CPT

## 2022-06-04 PROCEDURE — 84100 ASSAY OF PHOSPHORUS: CPT

## 2022-06-04 PROCEDURE — 36415 COLL VENOUS BLD VENIPUNCTURE: CPT

## 2022-06-04 PROCEDURE — 85018 HEMOGLOBIN: CPT

## 2022-06-04 PROCEDURE — 85014 HEMATOCRIT: CPT

## 2022-06-07 ENCOUNTER — EVALUATION (OUTPATIENT)
Dept: PHYSICAL THERAPY | Facility: CLINIC | Age: 87
End: 2022-06-07
Payer: MEDICARE

## 2022-06-07 DIAGNOSIS — Z96.652 STATUS POST TOTAL LEFT KNEE REPLACEMENT: Primary | ICD-10-CM

## 2022-06-07 PROCEDURE — 97162 PT EVAL MOD COMPLEX 30 MIN: CPT

## 2022-06-07 PROCEDURE — 97110 THERAPEUTIC EXERCISES: CPT

## 2022-06-07 NOTE — PROGRESS NOTES
PT Evaluation     Today's date: 2022  Patient name: Lisa Evans  : 1935  MRN: [id number]  Referring provider: Ke Espinosa  Dx:   Encounter Diagnosis     ICD-10-CM    1  Status post total left knee replacement  [id number]                   Assessment  Assessment details: Lisa Evans is a 82 y o  female who presents with signs and symptoms consistent of referring diagnosis  Patient presents with pain, decreased strength, decreased ROM, decreased joint mobility, ambulatory dysfunction and balance dysfunction  Due to these impairments, Patient has difficulty performing a/iadls, recreational activities and engaging in social activities  Of note, patient is most limited with L knee ROM which has led to decreased activity tolerance and mobility  Patient was educated on importance of performing exercises to improve ROM in order to improve her current condition  Patient was receptive to information provided by therapist this date  Patient would benefit from a comprehensive HEP focusing on improving ROM, LE strength, functional mobility, and motor control  Patient would benefit from skilled physical therapy to address the impairments, improve their level of function, and to improve their overall quality of life  PT POC 2x/wk for 6 weeks  Thank you for this referral      Impairments: abnormal gait, abnormal muscle firing, abnormal or restricted ROM, activity intolerance, impaired balance, impaired physical strength, lacks appropriate home exercise program and pain with function    Symptom irritability: lowUnderstanding of Dx/Px/POC: good   Prognosis: good    Goals  Short Term Goals: to be achieved by 4 weeks  1) Patient to be independent with basic HEP  2) Decrease pain to 2/10 at its worst   3) Increase knee ROM by 5-10 degrees   4) Increase LE strength by 1/2 MMT grade in all deficient planes      Long Term Goals: to be achieved by discharge  1) FOTO equal to or greater than 49   2) Ambulation to improve to maximal level of function  3) Stair negotiation will improve to reciprocal   4) Sit to stand transfers will improve to maximal level of function   5) Patient will be independent with comprehensive HEP  6) Patient will be able to return to long walks (>30mins) without pain  Plan  Plan details: PT POC 2x/wk for 6 weeks  Patient would benefit from: skilled physical therapy  Planned therapy interventions: activity modification, ADL retraining, joint mobilization, manual therapy, massage, neuromuscular re-education, patient education, therapeutic activities, therapeutic exercise, home exercise program, gait training and functional ROM exercises  Frequency: 2x week  Duration in weeks: 6  Plan of Care beginning date: 2022  Plan of Care expiration date: 2022  Treatment plan discussed with: patient        Subjective Evaluation    History of Present Illness  Mechanism of injury: surgery  Mechanism of injury: History of Current Injury: Patient reports having a knee replacement on   Patient reports that she has been getting better although she still struggles with functional movements  Patient reports being able to put her shoe on by herself this morning by herself  Patient reports difficulty standing up from a chair, stairs, walking for longer distances  Patient had been receiving home health PT and OT and was discharged last week  Pain location/Descriptors: L knee along the incision   Easing factors: Sit down, ice packs, medication  Special Questions: Jeffrey Dawn denies a new onset of Bladder incontinence and Bowel dysfunction  Denies saddle paresthesia  Denies increased swelling, redness, or pain in the knee     Patient goals:  "Go back to the [facility] for swimming", improve strength, improve mobility  Hobbies/Interest: Bingo, walking   Occupation: Retired     Pain  Current pain ratin  At best pain ratin  At worst pain ratin  Quality: dull ache    Patient Goals  Patient goals for therapy: decreased pain, decreased edema, improved balance, increased motion, return to sport/leisure activities, increased strength and independence with ADLs/IADLs          Objective     Tenderness     Additional Tenderness Details  Incision in tact, no redness, discharge     No tenderness along incision    Active Range of Motion   Left Knee   Flexion: 95 degrees with pain  Extension: -10 degrees with pain    Right Knee   Flexion: 130 degrees   Extension: 0 degrees     Mobility   Patellar Mobility:   Left Knee   WFL: medial, lateral, superior and inferior  Strength/Myotome Testing     Left Hip   Planes of Motion   Flexion: 4    Right Hip   Planes of Motion   Flexion: 4    Left Knee   Flexion: 4+  Extension: 4-  Quadriceps contraction: poor    Right Knee   Flexion: 4+  Extension: 4+  Quadriceps contraction: fair    Ambulation     Observational Gait   Decreased walking speed and stride length  Additional Observational Gait Details  Ipsilateral trunk lean to the L during stance on LLE     Functional Assessment        Forward Step Up 6"   Left Leg  Ipsilateral trunk side bending  Right Leg  Within functional limits  Forward Step Down 6"   Left Leg  Pain and increased forward trunk lean  Right Leg  Within functional limits       Comments  5x STS: 18 seconds UE support    TUG: 15 seconds              Diagnosis: L TKA    Precautions: SPC ambulation    POC Expires: 7/19   Re-evaluation Date: 7/5   FOTO Scores/Date: 52 (6/7)   Visit Count        Manuals        Knee PROM        Patelar PROM                                Ther Ex        Bike        Heel slides 10x HEP       Glute Sets 10x HEP       Quad Sets 10x HEP       Clamshells         Glute bridges         Prone Quad Stretch        Sit to stands         Hip 3 way         Heel raises         Neuro Re-Ed                                                                                                                       Ther Act              Heel to toe walking              Mini squats              Modalities

## 2022-06-07 NOTE — LETTER
2022    Venkata Najera MD  [address] [facility] [address]    Patient: Irene Nicole   YOB: 1935   Date of Visit: 2022     Encounter Diagnosis     ICD-10-CM    1  Status post total left knee replacement  [id number]        Dear Dr Garcia Guardian: Thank you for your recent referral of Irene Nicole  Please review the attached evaluation summary from Nicky's recent visit  Please verify that you agree with the plan of care by signing the attached order  If you have any questions or concerns, please do not hesitate to call  I sincerely appreciate the opportunity to share in the care of one of your patients and hope to have another opportunity to work with you in the near future  Sincerely,    Camelia Rg, PT      Referring Provider:      I certify that I have read the below Plan of Care and certify the need for these services furnished under this plan of treatment while under my care  Venkata Najera MD  [address] [facility] [address]  Via Fax: [phone]          PT Evaluation     Today's date: 2022  Patient name: Irene Nicole  : 1935  MRN: [id number]  Referring provider: Nishant Paniagua  Dx:   Encounter Diagnosis     ICD-10-CM    1  Status post total left knee replacement  [id number]                   Assessment  Assessment details: Irene Nicole is a 82 y o  female who presents with signs and symptoms consistent of referring diagnosis  Patient presents with pain, decreased strength, decreased ROM, decreased joint mobility, ambulatory dysfunction and balance dysfunction  Due to these impairments, Patient has difficulty performing a/iadls, recreational activities and engaging in social activities  Of note, patient is most limited with L knee ROM which has led to decreased activity tolerance and mobility   Patient was educated on importance of performing exercises to improve ROM in order to improve her current condition  Patient was receptive to information provided by therapist this date  Patient would benefit from a comprehensive HEP focusing on improving ROM, LE strength, functional mobility, and motor control  Patient would benefit from skilled physical therapy to address the impairments, improve their level of function, and to improve their overall quality of life  PT POC 2x/wk for 6 weeks  Thank you for this referral      Impairments: abnormal gait, abnormal muscle firing, abnormal or restricted ROM, activity intolerance, impaired balance, impaired physical strength, lacks appropriate home exercise program and pain with function    Symptom irritability: lowUnderstanding of Dx/Px/POC: good   Prognosis: good    Goals  Short Term Goals: to be achieved by 4 weeks  1) Patient to be independent with basic HEP  2) Decrease pain to 2/10 at its worst   3) Increase knee ROM by 5-10 degrees   4) Increase LE strength by 1/2 MMT grade in all deficient planes  Long Term Goals: to be achieved by discharge  1) FOTO equal to or greater than 49   2) Ambulation to improve to maximal level of function  3) Stair negotiation will improve to reciprocal   4) Sit to stand transfers will improve to maximal level of function   5) Patient will be independent with comprehensive HEP  6) Patient will be able to return to long walks (>30mins) without pain  Plan  Plan details: PT POC 2x/wk for 6 weeks     Patient would benefit from: skilled physical therapy  Planned therapy interventions: activity modification, ADL retraining, joint mobilization, manual therapy, massage, neuromuscular re-education, patient education, therapeutic activities, therapeutic exercise, home exercise program, gait training and functional ROM exercises  Frequency: 2x week  Duration in weeks: 6  Plan of Care beginning date: 6/7/2022  Plan of Care expiration date: 7/19/2022  Treatment plan discussed with: patient        Subjective Evaluation    History of Present Illness  Mechanism of injury: surgery  Mechanism of injury: History of Current Injury: Patient reports having a knee replacement on   Patient reports that she has been getting better although she still struggles with functional movements  Patient reports being able to put her shoe on by herself this morning by herself  Patient reports difficulty standing up from a chair, stairs, walking for longer distances  Patient had been receiving home health PT and OT and was discharged last week  Pain location/Descriptors: L knee along the incision   Easing factors: Sit down, ice packs, medication  Special Questions: Wylie Krabbe denies a new onset of Bladder incontinence and Bowel dysfunction  Denies saddle paresthesia  Denies increased swelling, redness, or pain in the knee  Patient goals:  "Go back to the [facility] for swimming", improve strength, improve mobility  Hobbies/Interest: Bingo, walking   Occupation: Retired     Pain  Current pain ratin  At best pain ratin  At worst pain ratin  Quality: dull ache    Patient Goals  Patient goals for therapy: decreased pain, decreased edema, improved balance, increased motion, return to sport/leisure activities, increased strength and independence with ADLs/IADLs          Objective     Tenderness     Additional Tenderness Details  Incision in tact, no redness, discharge     No tenderness along incision    Active Range of Motion   Left Knee   Flexion: 95 degrees with pain  Extension: -10 degrees with pain    Right Knee   Flexion: 130 degrees   Extension: 0 degrees     Mobility   Patellar Mobility:   Left Knee   WFL: medial, lateral, superior and inferior       Strength/Myotome Testing     Left Hip   Planes of Motion   Flexion: 4    Right Hip   Planes of Motion   Flexion: 4    Left Knee   Flexion: 4+  Extension: 4-  Quadriceps contraction: poor    Right Knee   Flexion: 4+  Extension: 4+  Quadriceps contraction: fair    Ambulation Observational Gait   Decreased walking speed and stride length  Additional Observational Gait Details  Ipsilateral trunk lean to the L during stance on LLE     Functional Assessment        Forward Step Up 6"   Left Leg  Ipsilateral trunk side bending  Right Leg  Within functional limits  Forward Step Down 6"   Left Leg  Pain and increased forward trunk lean  Right Leg  Within functional limits       Comments  5x STS: 18 seconds UE support    TUG: 15 seconds              Diagnosis: L TKA    Precautions: SPC ambulation    POC Expires: 7/19   Re-evaluation Date: 7/5   FOTO Scores/Date: 52 (6/7)   Visit Count        Manuals        Knee PROM        Patelar PROM                                Ther Ex        Bike        Heel slides 10x HEP       Glute Sets 10x HEP       Quad Sets 10x HEP       Clamshells         Glute bridges         Prone Quad Stretch        Sit to stands         Hip 3 way         Heel raises         Neuro Re-Ed                                                                                                                       Ther Act              Heel to toe walking              Mini squats              Modalities

## 2022-06-14 ENCOUNTER — OFFICE VISIT (OUTPATIENT)
Dept: PHYSICAL THERAPY | Facility: CLINIC | Age: 87
End: 2022-06-14
Payer: MEDICARE

## 2022-06-14 DIAGNOSIS — Z96.652 STATUS POST TOTAL LEFT KNEE REPLACEMENT: Primary | ICD-10-CM

## 2022-06-14 PROCEDURE — 97140 MANUAL THERAPY 1/> REGIONS: CPT

## 2022-06-14 PROCEDURE — 97110 THERAPEUTIC EXERCISES: CPT

## 2022-06-14 NOTE — PROGRESS NOTES
Daily Note     Today's date: 2022  Patient name: Gena Chavez  : 1935  MRN: [id number]  Referring provider: Madan Quintana  Dx:   Encounter Diagnosis     ICD-10-CM    1  Status post total left knee replacement  [id number] [id number]                   Subjective: Pt states she was at the [address] [place] [address] yesterday with her grandchildren so her knee is pretty sore today  Objective: See treatment diary below      Assessment: Began session on bike, pt unable to complete full revolutions 2* knee joint stiffness  Cueing required throughout session to ensure proper form and mechanics  Difficulty with posterior weight shift into glutes with mini squats, improved with cueing  Able to maintain good upright trunk posture with standing hip 3 way with cueing  Plan: Continue per plan of care        Diagnosis: L TKA    Precautions: SPC ambulation    POC Expires:    Re-evaluation Date:    FOTO Scores/Date: 52 ()   Visit Count  2/10      Manuals        Knee PROM  MS      Patelar PROM  MS                              Ther Ex        Bike  5' fwd/bkwd      Heel slides 10x HEP 5"x10      Glute Sets 10x HEP 5"x10      Quad Sets 10x HEP 5"x10      Clamshells         Glute bridges   5"x10      Prone Quad Stretch        Sit to stands         Hip 3 way   x10 ea      Heel raises   2x10      LAQ  3"x20 ea      Neuro Re-Ed                                                                                                                       Ther Act              Heel to toe walking              Mini squats     x10 at / bar         Modalities

## 2022-06-16 ENCOUNTER — OFFICE VISIT (OUTPATIENT)
Dept: PHYSICAL THERAPY | Facility: CLINIC | Age: 87
End: 2022-06-16
Payer: MEDICARE

## 2022-06-16 DIAGNOSIS — Z96.652 STATUS POST TOTAL LEFT KNEE REPLACEMENT: Primary | ICD-10-CM

## 2022-06-16 PROCEDURE — 97110 THERAPEUTIC EXERCISES: CPT

## 2022-06-16 PROCEDURE — 97140 MANUAL THERAPY 1/> REGIONS: CPT

## 2022-06-16 NOTE — PROGRESS NOTES
Daily Note     Today's date: 2022  Patient name: Robinson Laura  : 1935  MRN: [id number]  Referring provider: Claudine Simon  Dx:   Encounter Diagnosis     ICD-10-CM    1  Status post total left knee replacement  Z96 652        Start Time: 1350  Stop Time: 0845  Total time in clinic (min): 46 1 minutes    Subjective: pt reports she is feeling pretty sore in the knee today possibly due to the rainy weather  Objective: See treatment diary below      Assessment: Tolerated treatment well  Patient would benefit from continued PT  Pt tolerated PROM fair due to pain  Did not progress that much this session secondary to increased pain  Pt had no increased pain w/ SAQ  Pt knee was stiff w/ PROM, but became better after a few minutes  Ended w/ CP to help w/ pain  Plan: Continue per plan of care        Diagnosis: L TKA    Precautions: SPC ambulation    POC Expires:    Re-evaluation Date:    FOTO Scores/Date: 52 ()   Visit Count  2/10 3/10     Manuals       Knee PROM  MS [facility]     Patelar PROM  MS                              Ther Ex        Bike  5' fwd/bkwd 6' fwd/bkwd     Heel slides 10x HEP 5"x10 5" 2x10     Glute Sets 10x HEP 5"x10 5"x10     Quad Sets 10x HEP 5"x10 5" 10x     Clamshells    SAQ 5" 15x     Glute bridges   5"x10 15x     Prone Quad Stretch        Sit to stands         Hip 3 way   x10 ea 10x ea     Heel raises   2x10 2x10     LAQ  3"x20 ea 20x ea     Neuro Re-Ed                                                                                                                       Ther Act              Heel to toe walking              Mini squats     x10 at / bar  nv       Modalities             CP   5'

## 2022-06-21 ENCOUNTER — APPOINTMENT (OUTPATIENT)
Dept: PHYSICAL THERAPY | Facility: CLINIC | Age: 87
End: 2022-06-21
Payer: MEDICARE

## 2022-06-23 ENCOUNTER — APPOINTMENT (OUTPATIENT)
Dept: PHYSICAL THERAPY | Facility: CLINIC | Age: 87
End: 2022-06-23
Payer: MEDICARE

## 2022-06-28 ENCOUNTER — OFFICE VISIT (OUTPATIENT)
Dept: PHYSICAL THERAPY | Facility: CLINIC | Age: 87
End: 2022-06-28
Payer: MEDICARE

## 2022-06-28 DIAGNOSIS — Z96.652 STATUS POST TOTAL LEFT KNEE REPLACEMENT: Primary | ICD-10-CM

## 2022-06-28 PROCEDURE — 97110 THERAPEUTIC EXERCISES: CPT

## 2022-06-28 PROCEDURE — 97140 MANUAL THERAPY 1/> REGIONS: CPT

## 2022-06-28 NOTE — PROGRESS NOTES
Daily Note     Today's date: 2022  Patient name: Sharon Leavitt  : 1935  MRN: [id number]  Referring provider: Eusebia Pena  Dx:   Encounter Diagnosis     ICD-10-CM    1  Status post total left knee replacement  Z96 [id number]        Start Time: 805          Subjective: pt reports she had a lot of pain and swelling post last session and that it was hard to move around  Stated that she is doing better today compared to last week  Objective: See treatment diary below      Assessment: Tolerated treatment well  Patient would benefit from continued PT  Educated pt that it is normal to have more swelling in the knee w/ more movement that is done  Discussed that it is normal to have clicking in the knee post TKA  Did not progress this session due to increased pain upon arrival  Improved mobility post PROM and heel slides  Plan: Continue per plan of care        Diagnosis: L TKA    Precautions: SPC ambulation    POC Expires:    Re-evaluation Date:    FOTO Scores/Date: 52 ()   Visit Count  2/10 310 4/10    Manuals      Knee PROM  MS [facility]    Patelar PROM  MS [facility]                            Ther Ex        Bike  5' fwd/bkwd 6' fwd/bkwd     Heel slides 10x HEP 5"x10 5" 2x10 5" 2x10    Glute Sets 10x HEP 5"x10 5"x10 5" 2x10    Quad Sets 10x HEP 5"x10 5" 10x 5" 10x    Clamshells    SAQ 5" 15x SAQ 5" 2x10    Glute bridges   5"x10 15x     Prone Quad Stretch        Ball squeezes     20x    Hip 3 way   x10 ea 10x ea     Heel raises   2x10 2x10     LAQ  3"x20 ea 20x ea 20x ea    Neuro Re-Ed                                                                                                                       Ther Act              Heel to toe walking              Mini squats     x10 at / bar  nv       Modalities             CP   5'

## 2022-06-30 ENCOUNTER — OFFICE VISIT (OUTPATIENT)
Dept: PHYSICAL THERAPY | Facility: CLINIC | Age: 87
End: 2022-06-30
Payer: MEDICARE

## 2022-06-30 DIAGNOSIS — Z96.652 STATUS POST TOTAL LEFT KNEE REPLACEMENT: Primary | ICD-10-CM

## 2022-06-30 PROCEDURE — 97110 THERAPEUTIC EXERCISES: CPT

## 2022-06-30 PROCEDURE — 97140 MANUAL THERAPY 1/> REGIONS: CPT

## 2022-06-30 NOTE — PROGRESS NOTES
Daily Note     Today's date: 2022  Patient name: Edith Wagoner  : 1935  MRN: [id number]  Referring provider: Erik Ruiz  Dx:   Encounter Diagnosis     ICD-10-CM    1  Status post total left knee replacement  Z96 652        Start Time: 0800          Subjective: pt reports he knee is feeling much better today compared to last session  Objective: See treatment diary below      Assessment: Tolerated treatment well  Patient would benefit from continued PT  Pt tolerated exercises better today w/ decreased pain  Cueing for proper quad engagement  Added standing hip strengthening w/ good tolerance  Plan: Continue per plan of care        Diagnosis: L TKA    Precautions: SPC ambulation    POC Expires:    Re-evaluation Date:    FOTO Scores/Date: 52 ()   Visit Count  2/10 3/10 4/10 5/10   Manuals     Knee PROM  MS [facility]   Patelar PROM  MS [facility]                           Ther Ex        Bike  5' fwd/bkwd 6' fwd/bkwd     Heel slides 10x HEP 5"x10 5" 2x10 5" 2x10 5" 2x10   Glute Sets 10x HEP 5"x10 5"x10 5" 2x10    Quad Sets 10x HEP 5"x10 5" 10x 5" 10x 5" 2x10   Clamshells    SAQ 5" 15x SAQ 5" 2x10 SAQ 5" 2x10   Glute bridges   5"x10 15x     Prone Quad Stretch        Ball squeezes     20x    Hip 3 way   x10 ea 10x ea  10x ea   Heel raises   2x10 2x10  2x10   LAQ  3"x20 ea 20x ea 20x ea 20x ea   Neuro Re-Ed                                                                                                                       Ther Act              Heel to toe walking              Mini squats     x10 at / bar  nv       Modalities             CP   5'

## 2022-07-05 ENCOUNTER — OFFICE VISIT (OUTPATIENT)
Dept: PHYSICAL THERAPY | Facility: CLINIC | Age: 87
End: 2022-07-05
Payer: MEDICARE

## 2022-07-05 DIAGNOSIS — Z96.652 STATUS POST TOTAL LEFT KNEE REPLACEMENT: Primary | ICD-10-CM

## 2022-07-05 PROCEDURE — 97110 THERAPEUTIC EXERCISES: CPT

## 2022-07-05 NOTE — PROGRESS NOTES
Daily Note     Today's date: 2022  Patient name: Renate Crowder  : 1935  MRN: [id number]  Referring provider: Beena Dockery  Dx:   Encounter Diagnosis     ICD-10-CM    1  Status post total left knee replacement  Z96 652        Start Time: 0800          Subjective: pt reports she is feeling pretty sore today in the knee  Objective: See treatment diary below      Assessment: Tolerated treatment well  Patient would benefit from continued PT  Added rocking on the bike for mobility purposes  Good mobility in knee overall  Cueing for correct form w/ squats including bending at the hips and not just the knees  Plan: Continue per plan of care        Diagnosis: L TKA    Precautions: SPC ambulation    POC Expires:    Re-evaluation Date:    FOTO Scores/Date: 52 ()   Visit Count 6/10   4/10 510   Manuals    Knee PROM [facility]   Patelar PROM [facility]                           Ther Ex        Bike Rocking 5'       Heel slides 5" 2x10   5" 2x10 5" 2x10   Glute Sets D/c   5" 2x10    Quad Sets 5" 2x10   5" 10x 5" 2x10   Clamshells  SAQ 5" 2x10   SAQ 5" 2x10 SAQ 5" 2x10   Glute bridges  10x       SLR 15x       Mini squat 10x       Hip 3 way      10x ea   Heel raises  20x    2x10   LAQ    20x ea 20x ea   Neuro Re-Ed                                                                                                                    Ther Act           Heel to toe walking           Mini squats           Modalities          CP

## 2022-07-12 ENCOUNTER — APPOINTMENT (OUTPATIENT)
Dept: PHYSICAL THERAPY | Facility: CLINIC | Age: 87
End: 2022-07-12
Payer: MEDICARE

## 2022-07-12 NOTE — PROGRESS NOTES
PT Discharge    Patient called office and after meeting with referring surgeon, would like to be d/c from [facility]

## 2022-07-14 ENCOUNTER — APPOINTMENT (OUTPATIENT)
Dept: PHYSICAL THERAPY | Facility: CLINIC | Age: 87
End: 2022-07-14
Payer: MEDICARE

## 2022-07-19 ENCOUNTER — APPOINTMENT (OUTPATIENT)
Dept: PHYSICAL THERAPY | Facility: CLINIC | Age: 87
End: 2022-07-19
Payer: MEDICARE

## 2022-07-21 ENCOUNTER — APPOINTMENT (OUTPATIENT)
Dept: PHYSICAL THERAPY | Facility: CLINIC | Age: 87
End: 2022-07-21
Payer: MEDICARE

## 2022-07-26 ENCOUNTER — APPOINTMENT (OUTPATIENT)
Dept: PHYSICAL THERAPY | Facility: CLINIC | Age: 87
End: 2022-07-26
Payer: MEDICARE

## 2022-07-28 ENCOUNTER — APPOINTMENT (OUTPATIENT)
Dept: PHYSICAL THERAPY | Facility: CLINIC | Age: 87
End: 2022-07-28
Payer: MEDICARE

## 2022-10-12 PROBLEM — U07.1 PNEUMONIA DUE TO COVID-19 VIRUS: Status: RESOLVED | Noted: 2021-02-23 | Resolved: 2022-10-12

## 2022-10-12 PROBLEM — J12.82 PNEUMONIA DUE TO COVID-19 VIRUS: Status: RESOLVED | Noted: 2021-02-23 | Resolved: 2022-10-12

## 2022-12-02 NOTE — PLAN OF CARE
[facility] Call Center    Phone Message    May a detailed message be left on voicemail: yes     Reason for Call: Patient recently found out she is pregnant. States she has a history of miscarriage and was told to reach out to care team to have lab orders placed to monitor HCG. Please reach out to patient to discuss. Thank you    Action Taken: Message routed to:  [facility]    Travel Screening: Not Applicable                                                                         Problem: PAIN - ADULT  Goal: Verbalizes/displays adequate comfort level or baseline comfort level  Description: Interventions:  - Encourage patient to monitor pain and request assistance  - Assess pain using appropriate pain scale  - Administer analgesics based on type and severity of pain and evaluate response  - Implement non-pharmacological measures as appropriate and evaluate response  - Consider cultural and social influences on pain and pain management  - Notify physician/advanced practitioner if interventions unsuccessful or patient reports new pain  Outcome: Progressing     Problem: INFECTION - ADULT  Goal: Absence or prevention of progression during hospitalization  Description: INTERVENTIONS:  - Assess and monitor for signs and symptoms of infection  - Monitor lab/diagnostic results  - Monitor all insertion sites, i e  indwelling lines, tubes, and drains  - Monitor endotracheal if appropriate and nasal secretions for changes in amount and color  - [place] appropriate cooling/warming therapies per order  - Administer medications as ordered  - Instruct and encourage patient and family to use good hand hygiene technique  - Identify and instruct in appropriate isolation precautions for identified infection/condition  Outcome: Progressing  Goal: Absence of fever/infection during neutropenic period  Description: INTERVENTIONS:  - Monitor WBC    Outcome: Progressing     Problem: SAFETY ADULT  Goal: Patient will remain free of falls  Description: INTERVENTIONS:  - Assess patient frequently for physical needs  -  Identify cognitive and physical deficits and behaviors that affect risk of falls    -  [place] fall precautions as indicated by assessment   - Educate patient/family on patient safety including physical limitations  - Instruct patient to call for assistance with activity based on assessment  - Modify environment to reduce risk of injury  - Consider OT/PT consult to assist with strengthening/mobility  Outcome: Progressing  Goal: Maintain or return to baseline ADL function  Description: INTERVENTIONS:  -  Assess patient's ability to carry out ADLs; assess patient's baseline for ADL function and identify physical deficits which impact ability to perform ADLs (bathing, care of mouth/teeth, toileting, grooming, dressing, etc )  - Assess/evaluate cause of self-care deficits   - Assess range of motion  - Assess patient's mobility; develop plan if impaired  - Assess patient's need for assistive devices and provide as appropriate  - Encourage maximum independence but intervene and supervise when necessary  - Involve family in performance of ADLs  - Assess for home care needs following discharge   - Consider OT consult to assist with ADL evaluation and planning for discharge  - Provide patient education as appropriate  Outcome: Progressing  Goal: Maintain or return mobility status to optimal level  Description: INTERVENTIONS:  - Assess patient's baseline mobility status (ambulation, transfers, stairs, etc )    - Identify cognitive and physical deficits and behaviors that affect mobility  - Identify mobility aids required to assist with transfers and/or ambulation (gait belt, sit-to-stand, lift, walker, cane, etc )  - [place] fall precautions as indicated by assessment  - Record patient progress and toleration of activity level on Mobility SBAR; progress patient to next Phase/Stage  - Instruct patient to call for assistance with activity based on assessment  - Consider rehabilitation consult to assist with strengthening/weightbearing, etc   Outcome: Progressing     Problem: DISCHARGE PLANNING  Goal: Discharge to home or other facility with appropriate resources  Description: INTERVENTIONS:  - Identify barriers to discharge w/patient and caregiver  - Arrange for needed discharge resources and transportation as appropriate  - Identify discharge learning needs (meds, wound care, etc )  - Arrange for interpretive services to assist at discharge as needed  - Refer to Case Management Department for coordinating discharge planning if the patient needs post-hospital services based on physician/advanced practitioner order or complex needs related to functional status, cognitive ability, or social support system  Outcome: Progressing     Problem: Knowledge Deficit  Goal: Patient/family/caregiver demonstrates understanding of disease process, treatment plan, medications, and discharge instructions  Description: Complete learning assessment and assess knowledge base  Interventions:  - Provide teaching at level of understanding  - Provide teaching via preferred learning methods  Outcome: Progressing     Problem: Potential for Falls  Goal: Patient will remain free of falls  Description: INTERVENTIONS:  - Assess patient frequently for physical needs  -  Identify cognitive and physical deficits and behaviors that affect risk of falls    -  [place] fall precautions as indicated by assessment   - Educate patient/family on patient safety including physical limitations  - Instruct patient to call for assistance with activity based on assessment  - Modify environment to reduce risk of injury  - Consider OT/PT consult to assist with strengthening/mobility  Outcome: Progressing     Problem: Prexisting or High Potential for Compromised Skin Integrity  Goal: Skin integrity is maintained or improved  Description: INTERVENTIONS:  - Identify patients at risk for skin breakdown  - Assess and monitor skin integrity  - Assess and monitor nutrition and hydration status  - Monitor labs   - Assess for incontinence   - Turn and reposition patient  - Assist with mobility/ambulation  - Relieve pressure over bony prominences  - Avoid friction and shearing  - Provide appropriate hygiene as needed including keeping skin clean and dry  - Evaluate need for skin moisturizer/barrier cream  - Collaborate with interdisciplinary team   - Patient/family teaching  - Consider wound care consult   Outcome: Progressing

## 2024-11-12 ENCOUNTER — OFFICE VISIT (OUTPATIENT)
Dept: OBGYN CLINIC | Facility: CLINIC | Age: 89
End: 2024-11-12
Payer: MEDICARE

## 2024-11-12 ENCOUNTER — APPOINTMENT (OUTPATIENT)
Dept: RADIOLOGY | Age: 89
End: 2024-11-12
Payer: COMMERCIAL

## 2024-11-12 VITALS
BODY MASS INDEX: 42.4 KG/M2 | HEART RATE: 73 BPM | SYSTOLIC BLOOD PRESSURE: 132 MMHG | DIASTOLIC BLOOD PRESSURE: 85 MMHG | HEIGHT: 58 IN | WEIGHT: 202 LBS

## 2024-11-12 DIAGNOSIS — Z96.659 FAILURE OF TOTAL KNEE REPLACEMENT, INITIAL ENCOUNTER  (HCC): ICD-10-CM

## 2024-11-12 DIAGNOSIS — Z96.652 HISTORY OF LEFT KNEE REPLACEMENT: ICD-10-CM

## 2024-11-12 DIAGNOSIS — T84.018A FAILURE OF TOTAL KNEE REPLACEMENT, INITIAL ENCOUNTER  (HCC): ICD-10-CM

## 2024-11-12 DIAGNOSIS — Z96.652 HISTORY OF LEFT KNEE REPLACEMENT: Primary | ICD-10-CM

## 2024-11-12 PROCEDURE — 73564 X-RAY EXAM KNEE 4 OR MORE: CPT

## 2024-11-12 PROCEDURE — 73562 X-RAY EXAM OF KNEE 3: CPT

## 2024-11-12 PROCEDURE — 99204 OFFICE O/P NEW MOD 45 MIN: CPT | Performed by: STUDENT IN AN ORGANIZED HEALTH CARE EDUCATION/TRAINING PROGRAM

## 2024-11-12 RX ORDER — LOSARTAN POTASSIUM 100 MG/1
1 TABLET ORAL DAILY
COMMUNITY
Start: 2024-09-14

## 2024-11-12 NOTE — PROGRESS NOTES
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    -Thorough discussion was had with the patient regarding the etiology of her left knee pain.  It was explained that in order to definitively address the left knee instability, I would recommend revision knee surgery in the form of 1 versus 2 component exchange.  Patient would like to take more time to consider surgical intervention.  -Weight loss was discussed with the patient.  It was explained that she would need to achieve a body mass index of 40 or below in order to qualify for revision surgery  -WBAT  -Activity modification to limit strain or impact on the joint  -Tylenol 1000mg up to three times daily as needed. Do not exceed 3000mg daily  -Knee sleeve or brace for comfort  -May use cane to offload the joint  -Patient may follow up prn for further evaluation and treatment         Subjective:     Knee Pain  Patient complains of left knee pain. This is evaluated as a personal injury. She had a TKA by Dr. Green in . The pain began 2 years ago. She states that it has been swollen since the initial surgery. The pain is located medial, anterior.  She describes the symptoms as sharp, shooting, and stabbing. Symptoms improve with rest, heat, ice, physical therapy, the use of a cane. The symptoms are worse with activity, stair climbing, kneeling, weight bearing, sitting for prolonged periods of time. The knee has given out or felt unstable. The patient can bend and straighten the knee fully.  The patient is active in none. Treatment to date has been ice, heat, Tylenol, NSAID's, knee sleeve/brace, therapy, without significant relief.    External Records Reviewed: historical  medical records    Allergy:  Allergies   Allergen Reactions    Erythromycin Anaphylaxis, Rash and Tremor    Lisinopril Other (See Comments)     Facial swelling-   Facial swelling-   Facial swelling-    Facial swelling-   Facial swelling-    Pravastatin Other (See Comments)     Pt. Not sure.      Medications:  all current active meds have been reviewed  Past Medical History:  Past Medical History:   Diagnosis Date    -polyvalent pneumococcal polysaccharide vaccine contraindicated as received shingles vaccine within last 4 weeks     2019    Angina pectoris (HCC)     Arthritis     Asthma     COPD (chronic obstructive pulmonary disease) (HCC)     Deaf, right     Disease of thyroid gland     hypothyroidism    GERD (gastroesophageal reflux disease)     Glaucoma     Hiatal hernia     Hyperlipidemia     Hypertension     Renal disorder      Past Surgical History:  Past Surgical History:   Procedure Laterality Date    ABDOMINAL SURGERY      exploratory surgery    APPENDECTOMY      BREAST SURGERY      Bilateral biopsys, benign    COLONOSCOPY N/A 2018    Procedure: COLONOSCOPY;  Surgeon: Anderson Urrutia MD;  Location: [facility];  Service: Gastroenterology    EAR SURGERY      EYE SURGERY      cataracts     HEMORRHOID SURGERY      NOSE SURGERY      rhinoplasty    SHOULDER SURGERY  2008    left     TONSILLECTOMY      TRIGGER FINGER RELEASE Left      Family History:  Family History   Problem Relation Age of Onset    Heart disease Mother     Heart disease Father     Diabetes Brother     Cancer Brother     Cancer Son         lymphoma     Social History:  Social History     Substance and Sexual Activity   Alcohol Use Never     Social History     Substance and Sexual Activity   Drug Use Never     Social History     Tobacco Use   Smoking Status Former    Current packs/day: 0.00    Types: Cigarettes    Quit date: 1990    Years since quittin.2   Smokeless Tobacco Never           ROS:   Review of  "Systems   Constitutional:  Negative for chills and fever.   HENT:  Negative for ear pain and sore throat.    Eyes:  Negative for pain and visual disturbance.   Respiratory:  Negative for cough and shortness of breath.    Cardiovascular:  Negative for chest pain and palpitations.   Gastrointestinal:  Negative for abdominal pain and vomiting.   Genitourinary:  Negative for dysuria and hematuria.   Musculoskeletal:  Negative for arthralgias and back pain.   Skin:  Negative for color change and rash.   Neurological:  Negative for seizures and syncope.   All other systems reviewed and are negative.       Objective:   BP Readings from Last 1 Encounters:   11/12/24 132/85      Wt Readings from Last 1 Encounters:   11/12/24 91.6 kg (202 lb)      Pulse Readings from Last 1 Encounters:   11/12/24 73      BMI: Estimated body mass index is 42.22 kg/m² as calculated from the following:    Height as of this encounter: 4' 10\" (1.473 m).    Weight as of this encounter: 91.6 kg (202 lb).        Physical Exam  Constitutional:       General: She is not in acute distress.  HENT:      Head: Normocephalic and atraumatic.   Eyes:      Conjunctiva/sclera: Conjunctivae normal.   Cardiovascular:      Comments: Extremities well perfused   No LE edema    Pulmonary:      Effort: Pulmonary effort is normal.   Abdominal:      General: Abdomen is flat. Bowel sounds are normal.   Neurological:      Mental Status: She is alert. Mental status is at baseline.          Gait and Station:   antalgic    Bilateral Lower Extremity:  Left Knee:      Inspection: Well-healed anterior incision    Overall limb alignment: Neutral    Effusion: Moderate    ROM 0 to 110 without pain    Extensor Lag: Absent    Palpation: Medial joint line tenderness to palpation    unstable to AP translation at 90 deg    Coronal plane stable in full extension    Coronal plane unstable in mid-flexion     Motor: 5/5 EHL/FHL/TA/GS/Qd/Hs    Vascular: Toes WWP with BCR    Sensory: SILT " DP/SP/Marisol/Saph/Tib        Images:    I personally reviewed relevant images in the PACS system and my interpretation is as follows:  X-rays of the left knee demonstrate a total knee arthroplasty in the appropriate position without evidence of loosening or subsidence    Scribe Attestation      I,:  Asa Sierra am acting as a scribe while in the presence of the attending physician.:       I,:  Matthew Duke MD personally performed the services described in this documentation    as scribed in my presence.:              Matthew Duke MD  Adult Reconstruction Specialist   [facility]

## 2024-11-13 NOTE — ASSESSMENT & PLAN NOTE
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    -Thorough discussion was had with the patient regarding the etiology of her left knee pain.  It was explained that in order to definitively address the left knee instability, I would recommend revision knee surgery in the form of 1 versus 2 component exchange.  Patient would like to take more time to consider surgical intervention.  -Weight loss was discussed with the patient.  It was explained that she would need to achieve a body mass index of 40 or below in order to qualify for revision surgery  -WBAT  -Activity modification to limit strain or impact on the joint  -Tylenol 1000mg up to three times daily as needed. Do not exceed 3000mg daily  -Knee sleeve or brace for comfort  -May use cane to offload the joint  -Patient may follow up prn for further evaluation and treatment